# Patient Record
Sex: FEMALE | Race: WHITE | NOT HISPANIC OR LATINO | Employment: FULL TIME | URBAN - METROPOLITAN AREA
[De-identification: names, ages, dates, MRNs, and addresses within clinical notes are randomized per-mention and may not be internally consistent; named-entity substitution may affect disease eponyms.]

---

## 2018-01-18 ENCOUNTER — ANESTHESIA (OUTPATIENT)
Dept: SURGERY | Facility: CLINIC | Age: 28
End: 2018-01-18
Payer: COMMERCIAL

## 2018-01-18 ENCOUNTER — APPOINTMENT (OUTPATIENT)
Dept: CT IMAGING | Facility: CLINIC | Age: 28
End: 2018-01-18
Attending: EMERGENCY MEDICINE
Payer: COMMERCIAL

## 2018-01-18 ENCOUNTER — APPOINTMENT (OUTPATIENT)
Dept: GENERAL RADIOLOGY | Facility: CLINIC | Age: 28
End: 2018-01-18
Attending: EMERGENCY MEDICINE
Payer: COMMERCIAL

## 2018-01-18 ENCOUNTER — HOSPITAL ENCOUNTER (INPATIENT)
Facility: CLINIC | Age: 28
LOS: 6 days | Discharge: HOME | End: 2018-01-24
Attending: EMERGENCY MEDICINE | Admitting: SURGERY
Payer: COMMERCIAL

## 2018-01-18 ENCOUNTER — ANESTHESIA EVENT (OUTPATIENT)
Dept: SURGERY | Facility: CLINIC | Age: 28
End: 2018-01-18
Payer: COMMERCIAL

## 2018-01-18 DIAGNOSIS — R19.8 PERFORATED VISCUS: ICD-10-CM

## 2018-01-18 DIAGNOSIS — K35.32 RUPTURED APPENDICITIS: Primary | ICD-10-CM

## 2018-01-18 LAB
ALBUMIN SERPL-MCNC: 1.9 G/DL (ref 3.4–5)
ALBUMIN SERPL-MCNC: 2.9 G/DL (ref 3.4–5)
ALBUMIN UR-MCNC: 30 MG/DL
ALP SERPL-CCNC: 180 U/L (ref 40–150)
ALP SERPL-CCNC: 39 U/L (ref 40–150)
ALT SERPL W P-5'-P-CCNC: 15 U/L (ref 0–50)
ALT SERPL W P-5'-P-CCNC: 9 U/L (ref 0–50)
ANION GAP SERPL CALCULATED.3IONS-SCNC: 10 MMOL/L (ref 3–14)
ANION GAP SERPL CALCULATED.3IONS-SCNC: 12 MMOL/L (ref 3–14)
ANION GAP SERPL CALCULATED.3IONS-SCNC: 14 MMOL/L (ref 3–14)
APPEARANCE UR: CLEAR
AST SERPL W P-5'-P-CCNC: 13 U/L (ref 0–45)
AST SERPL W P-5'-P-CCNC: 16 U/L (ref 0–45)
BACTERIA #/AREA URNS HPF: ABNORMAL /HPF
BASE DEFICIT BLDV-SCNC: 11.8 MMOL/L
BASOPHILS # BLD AUTO: 0 10E9/L (ref 0–0.2)
BASOPHILS NFR BLD AUTO: 0 %
BILIRUB DIRECT SERPL-MCNC: 0.7 MG/DL (ref 0–0.2)
BILIRUB SERPL-MCNC: 1.1 MG/DL (ref 0.2–1.3)
BILIRUB SERPL-MCNC: 1.3 MG/DL (ref 0.2–1.3)
BILIRUB UR QL STRIP: NEGATIVE
BUN SERPL-MCNC: 15 MG/DL (ref 7–30)
BUN SERPL-MCNC: 18 MG/DL (ref 7–30)
BUN SERPL-MCNC: 22 MG/DL (ref 7–30)
CA-I BLD-MCNC: 4.5 MG/DL (ref 4.4–5.2)
CALCIUM SERPL-MCNC: 7 MG/DL (ref 8.5–10.1)
CALCIUM SERPL-MCNC: 7.2 MG/DL (ref 8.5–10.1)
CALCIUM SERPL-MCNC: 9.1 MG/DL (ref 8.5–10.1)
CHLORIDE SERPL-SCNC: 112 MMOL/L (ref 94–109)
CHLORIDE SERPL-SCNC: 113 MMOL/L (ref 94–109)
CHLORIDE SERPL-SCNC: 99 MMOL/L (ref 94–109)
CO2 SERPL-SCNC: 15 MMOL/L (ref 20–32)
CO2 SERPL-SCNC: 15 MMOL/L (ref 20–32)
CO2 SERPL-SCNC: 17 MMOL/L (ref 20–32)
COLOR UR AUTO: YELLOW
CREAT SERPL-MCNC: 0.82 MG/DL (ref 0.52–1.04)
CREAT SERPL-MCNC: 0.97 MG/DL (ref 0.52–1.04)
CREAT SERPL-MCNC: 1.54 MG/DL (ref 0.52–1.04)
DIFFERENTIAL METHOD BLD: ABNORMAL
EOSINOPHIL # BLD AUTO: 0 10E9/L (ref 0–0.7)
EOSINOPHIL NFR BLD AUTO: 0 %
ERYTHROCYTE [DISTWIDTH] IN BLOOD BY AUTOMATED COUNT: 12.2 % (ref 10–15)
ERYTHROCYTE [DISTWIDTH] IN BLOOD BY AUTOMATED COUNT: 12.6 % (ref 10–15)
GFR SERPL CREATININE-BSD FRML MDRD: 41 ML/MIN/1.7M2
GFR SERPL CREATININE-BSD FRML MDRD: 70 ML/MIN/1.7M2
GFR SERPL CREATININE-BSD FRML MDRD: 85 ML/MIN/1.7M2
GLUCOSE BLD-MCNC: 131 MG/DL (ref 70–99)
GLUCOSE BLDC GLUCOMTR-MCNC: 101 MG/DL (ref 70–99)
GLUCOSE BLDC GLUCOMTR-MCNC: 119 MG/DL (ref 70–99)
GLUCOSE BLDC GLUCOMTR-MCNC: 119 MG/DL (ref 70–99)
GLUCOSE BLDC GLUCOMTR-MCNC: 123 MG/DL (ref 70–99)
GLUCOSE SERPL-MCNC: 123 MG/DL (ref 70–99)
GLUCOSE SERPL-MCNC: 141 MG/DL (ref 70–99)
GLUCOSE SERPL-MCNC: 168 MG/DL (ref 70–99)
GLUCOSE UR STRIP-MCNC: NEGATIVE MG/DL
HBA1C MFR BLD: 4.6 % (ref 4.3–6)
HCG SERPL QL: NEGATIVE
HCO3 BLDV-SCNC: 16 MMOL/L (ref 21–28)
HCT VFR BLD AUTO: 37.9 % (ref 35–47)
HCT VFR BLD AUTO: 48.1 % (ref 35–47)
HGB BLD-MCNC: 13.2 G/DL (ref 11.7–15.7)
HGB BLD-MCNC: 14.2 G/DL (ref 11.7–15.7)
HGB BLD-MCNC: 17.2 G/DL (ref 11.7–15.7)
HGB UR QL STRIP: ABNORMAL
INTERPRETATION ECG - MUSE: NORMAL
KETONES UR STRIP-MCNC: 5 MG/DL
LACTATE BLD-SCNC: 2.3 MMOL/L (ref 0.7–2)
LACTATE BLD-SCNC: 2.4 MMOL/L (ref 0.7–2)
LACTATE BLD-SCNC: 2.9 MMOL/L (ref 0.7–2)
LACTATE BLD-SCNC: 4.4 MMOL/L (ref 0.7–2)
LEUKOCYTE ESTERASE UR QL STRIP: NEGATIVE
LIPASE SERPL-CCNC: 61 U/L (ref 73–393)
LYMPHOCYTES # BLD AUTO: 0.2 10E9/L (ref 0.8–5.3)
LYMPHOCYTES NFR BLD AUTO: 9.2 %
MAGNESIUM SERPL-MCNC: 1.4 MG/DL (ref 1.6–2.3)
MCH RBC QN AUTO: 33.2 PG (ref 26.5–33)
MCH RBC QN AUTO: 33.6 PG (ref 26.5–33)
MCHC RBC AUTO-ENTMCNC: 34.8 G/DL (ref 31.5–36.5)
MCHC RBC AUTO-ENTMCNC: 35.8 G/DL (ref 31.5–36.5)
MCV RBC AUTO: 94 FL (ref 78–100)
MCV RBC AUTO: 96 FL (ref 78–100)
METAMYELOCYTES # BLD: 0.2 10E9/L
METAMYELOCYTES NFR BLD MANUAL: 7.6 %
MONOCYTES # BLD AUTO: 0.1 10E9/L (ref 0–1.3)
MONOCYTES NFR BLD AUTO: 3.4 %
MUCOUS THREADS #/AREA URNS LPF: PRESENT /LPF
NEUTROPHILS # BLD AUTO: 2 10E9/L (ref 1.6–8.3)
NEUTROPHILS NFR BLD AUTO: 79.8 %
NITRATE UR QL: NEGATIVE
O2/TOTAL GAS SETTING VFR VENT: 50 %
PCO2 BLDV: 44 MM HG (ref 40–50)
PH BLDV: 7.18 PH (ref 7.32–7.43)
PH UR STRIP: 6 PH (ref 5–7)
PHOSPHATE SERPL-MCNC: 2.5 MG/DL (ref 2.5–4.5)
PHOSPHATE SERPL-MCNC: 2.6 MG/DL (ref 2.5–4.5)
PHOSPHATE SERPL-MCNC: 2.7 MG/DL (ref 2.5–4.5)
PLATELET # BLD AUTO: 142 10E9/L (ref 150–450)
PLATELET # BLD AUTO: 162 10E9/L (ref 150–450)
PO2 BLDV: 40 MM HG (ref 25–47)
POTASSIUM BLD-SCNC: 2.7 MMOL/L (ref 3.4–5.3)
POTASSIUM SERPL-SCNC: 2.4 MMOL/L (ref 3.4–5.3)
POTASSIUM SERPL-SCNC: 2.9 MMOL/L (ref 3.4–5.3)
POTASSIUM SERPL-SCNC: 3.5 MMOL/L (ref 3.4–5.3)
POTASSIUM SERPL-SCNC: 3.8 MMOL/L (ref 3.4–5.3)
PROT SERPL-MCNC: 4.8 G/DL (ref 6.8–8.8)
PROT SERPL-MCNC: 8 G/DL (ref 6.8–8.8)
RADIOLOGIST FLAGS: ABNORMAL
RBC # BLD AUTO: 3.97 10E12/L (ref 3.8–5.2)
RBC # BLD AUTO: 5.12 10E12/L (ref 3.8–5.2)
RBC #/AREA URNS AUTO: 2 /HPF (ref 0–2)
RBC MORPH BLD: NORMAL
SODIUM BLD-SCNC: 135 MMOL/L (ref 133–144)
SODIUM SERPL-SCNC: 128 MMOL/L (ref 133–144)
SODIUM SERPL-SCNC: 140 MMOL/L (ref 133–144)
SODIUM SERPL-SCNC: 140 MMOL/L (ref 133–144)
SOURCE: ABNORMAL
SP GR UR STRIP: 1.04 (ref 1–1.03)
SQUAMOUS #/AREA URNS AUTO: <1 /HPF (ref 0–1)
TRANS CELLS #/AREA URNS HPF: <1 /HPF (ref 0–1)
UROBILINOGEN UR STRIP-MCNC: NORMAL MG/DL (ref 0–2)
WBC # BLD AUTO: 2.5 10E9/L (ref 4–11)
WBC # BLD AUTO: 3.9 10E9/L (ref 4–11)
WBC #/AREA URNS AUTO: 2 /HPF (ref 0–2)

## 2018-01-18 PROCEDURE — 25000128 H RX IP 250 OP 636: Performed by: NURSE ANESTHETIST, CERTIFIED REGISTERED

## 2018-01-18 PROCEDURE — 99233 SBSQ HOSP IP/OBS HIGH 50: CPT | Mod: GC | Performed by: ANESTHESIOLOGY

## 2018-01-18 PROCEDURE — 85025 COMPLETE CBC W/AUTO DIFF WBC: CPT | Performed by: EMERGENCY MEDICINE

## 2018-01-18 PROCEDURE — 36415 COLL VENOUS BLD VENIPUNCTURE: CPT | Performed by: SURGERY

## 2018-01-18 PROCEDURE — 87040 BLOOD CULTURE FOR BACTERIA: CPT | Performed by: EMERGENCY MEDICINE

## 2018-01-18 PROCEDURE — 84295 ASSAY OF SERUM SODIUM: CPT | Performed by: SURGERY

## 2018-01-18 PROCEDURE — 84132 ASSAY OF SERUM POTASSIUM: CPT | Performed by: SURGERY

## 2018-01-18 PROCEDURE — 25000128 H RX IP 250 OP 636: Performed by: STUDENT IN AN ORGANIZED HEALTH CARE EDUCATION/TRAINING PROGRAM

## 2018-01-18 PROCEDURE — 96376 TX/PRO/DX INJ SAME DRUG ADON: CPT

## 2018-01-18 PROCEDURE — 96361 HYDRATE IV INFUSION ADD-ON: CPT

## 2018-01-18 PROCEDURE — 84100 ASSAY OF PHOSPHORUS: CPT | Performed by: STUDENT IN AN ORGANIZED HEALTH CARE EDUCATION/TRAINING PROGRAM

## 2018-01-18 PROCEDURE — 74177 CT ABD & PELVIS W/CONTRAST: CPT

## 2018-01-18 PROCEDURE — C9399 UNCLASSIFIED DRUGS OR BIOLOG: HCPCS | Performed by: NURSE ANESTHETIST, CERTIFIED REGISTERED

## 2018-01-18 PROCEDURE — 71000017 ZZH RECOVERY PHASE 1 LEVEL 3 EA ADDTL HR: Performed by: SURGERY

## 2018-01-18 PROCEDURE — 85027 COMPLETE CBC AUTOMATED: CPT | Performed by: STUDENT IN AN ORGANIZED HEALTH CARE EDUCATION/TRAINING PROGRAM

## 2018-01-18 PROCEDURE — 36000057 ZZH SURGERY LEVEL 3 1ST 30 MIN - UMMC: Performed by: SURGERY

## 2018-01-18 PROCEDURE — 82330 ASSAY OF CALCIUM: CPT | Performed by: SURGERY

## 2018-01-18 PROCEDURE — 25000132 ZZH RX MED GY IP 250 OP 250 PS 637: Performed by: STUDENT IN AN ORGANIZED HEALTH CARE EDUCATION/TRAINING PROGRAM

## 2018-01-18 PROCEDURE — 80048 BASIC METABOLIC PNL TOTAL CA: CPT | Performed by: STUDENT IN AN ORGANIZED HEALTH CARE EDUCATION/TRAINING PROGRAM

## 2018-01-18 PROCEDURE — 37000009 ZZH ANESTHESIA TECHNICAL FEE, EACH ADDTL 15 MIN: Performed by: SURGERY

## 2018-01-18 PROCEDURE — P9041 ALBUMIN (HUMAN),5%, 50ML: HCPCS | Performed by: NURSE ANESTHETIST, CERTIFIED REGISTERED

## 2018-01-18 PROCEDURE — 25000128 H RX IP 250 OP 636: Performed by: EMERGENCY MEDICINE

## 2018-01-18 PROCEDURE — 83605 ASSAY OF LACTIC ACID: CPT | Performed by: SURGERY

## 2018-01-18 PROCEDURE — 99285 EMERGENCY DEPT VISIT HI MDM: CPT | Mod: 25

## 2018-01-18 PROCEDURE — 82803 BLOOD GASES ANY COMBINATION: CPT | Performed by: SURGERY

## 2018-01-18 PROCEDURE — 93005 ELECTROCARDIOGRAM TRACING: CPT

## 2018-01-18 PROCEDURE — 80053 COMPREHEN METABOLIC PANEL: CPT | Performed by: EMERGENCY MEDICINE

## 2018-01-18 PROCEDURE — 96365 THER/PROPH/DIAG IV INF INIT: CPT

## 2018-01-18 PROCEDURE — 00000146 ZZHCL STATISTIC GLUCOSE BY METER IP

## 2018-01-18 PROCEDURE — 83690 ASSAY OF LIPASE: CPT | Performed by: EMERGENCY MEDICINE

## 2018-01-18 PROCEDURE — 82947 ASSAY GLUCOSE BLOOD QUANT: CPT | Performed by: SURGERY

## 2018-01-18 PROCEDURE — 12000008 ZZH R&B INTERMEDIATE UMMC

## 2018-01-18 PROCEDURE — 40000275 ZZH STATISTIC RCP TIME EA 10 MIN

## 2018-01-18 PROCEDURE — 83735 ASSAY OF MAGNESIUM: CPT | Performed by: STUDENT IN AN ORGANIZED HEALTH CARE EDUCATION/TRAINING PROGRAM

## 2018-01-18 PROCEDURE — 37000008 ZZH ANESTHESIA TECHNICAL FEE, 1ST 30 MIN: Performed by: SURGERY

## 2018-01-18 PROCEDURE — 87186 SC STD MICRODIL/AGAR DIL: CPT | Performed by: SURGERY

## 2018-01-18 PROCEDURE — 81001 URINALYSIS AUTO W/SCOPE: CPT | Performed by: EMERGENCY MEDICINE

## 2018-01-18 PROCEDURE — 84703 CHORIONIC GONADOTROPIN ASSAY: CPT | Performed by: EMERGENCY MEDICINE

## 2018-01-18 PROCEDURE — 25000566 ZZH SEVOFLURANE, EA 15 MIN: Performed by: SURGERY

## 2018-01-18 PROCEDURE — 87086 URINE CULTURE/COLONY COUNT: CPT | Performed by: EMERGENCY MEDICINE

## 2018-01-18 PROCEDURE — 80076 HEPATIC FUNCTION PANEL: CPT | Performed by: STUDENT IN AN ORGANIZED HEALTH CARE EDUCATION/TRAINING PROGRAM

## 2018-01-18 PROCEDURE — 40000196 ZZH STATISTIC RAPCV CVP MONITORING

## 2018-01-18 PROCEDURE — 84100 ASSAY OF PHOSPHORUS: CPT | Performed by: SURGERY

## 2018-01-18 PROCEDURE — 25000128 H RX IP 250 OP 636: Performed by: ANESTHESIOLOGY

## 2018-01-18 PROCEDURE — 36415 COLL VENOUS BLD VENIPUNCTURE: CPT | Performed by: STUDENT IN AN ORGANIZED HEALTH CARE EDUCATION/TRAINING PROGRAM

## 2018-01-18 PROCEDURE — 27210794 ZZH OR GENERAL SUPPLY STERILE: Performed by: SURGERY

## 2018-01-18 PROCEDURE — 87081 CULTURE SCREEN ONLY: CPT | Performed by: SURGERY

## 2018-01-18 PROCEDURE — 99284 EMERGENCY DEPT VISIT MOD MDM: CPT | Mod: Z6 | Performed by: EMERGENCY MEDICINE

## 2018-01-18 PROCEDURE — 88304 TISSUE EXAM BY PATHOLOGIST: CPT | Performed by: SURGERY

## 2018-01-18 PROCEDURE — 25000125 ZZHC RX 250: Performed by: EMERGENCY MEDICINE

## 2018-01-18 PROCEDURE — 71000016 ZZH RECOVERY PHASE 1 LEVEL 3 FIRST HR: Performed by: SURGERY

## 2018-01-18 PROCEDURE — 36000059 ZZH SURGERY LEVEL 3 EA 15 ADDTL MIN UMMC: Performed by: SURGERY

## 2018-01-18 PROCEDURE — 40000014 ZZH STATISTIC ARTERIAL MONITORING DAILY

## 2018-01-18 PROCEDURE — 74018 RADEX ABDOMEN 1 VIEW: CPT

## 2018-01-18 PROCEDURE — 83735 ASSAY OF MAGNESIUM: CPT | Performed by: SURGERY

## 2018-01-18 PROCEDURE — 0DTJ0ZZ RESECTION OF APPENDIX, OPEN APPROACH: ICD-10-PCS | Performed by: SURGERY

## 2018-01-18 PROCEDURE — 83605 ASSAY OF LACTIC ACID: CPT | Performed by: EMERGENCY MEDICINE

## 2018-01-18 PROCEDURE — 25000128 H RX IP 250 OP 636: Performed by: SURGERY

## 2018-01-18 PROCEDURE — 83036 HEMOGLOBIN GLYCOSYLATED A1C: CPT | Performed by: STUDENT IN AN ORGANIZED HEALTH CARE EDUCATION/TRAINING PROGRAM

## 2018-01-18 PROCEDURE — 83605 ASSAY OF LACTIC ACID: CPT | Performed by: STUDENT IN AN ORGANIZED HEALTH CARE EDUCATION/TRAINING PROGRAM

## 2018-01-18 PROCEDURE — 25000125 ZZHC RX 250: Performed by: NURSE ANESTHETIST, CERTIFIED REGISTERED

## 2018-01-18 PROCEDURE — 96366 THER/PROPH/DIAG IV INF ADDON: CPT

## 2018-01-18 PROCEDURE — 96375 TX/PRO/DX INJ NEW DRUG ADDON: CPT

## 2018-01-18 RX ORDER — IOPAMIDOL 755 MG/ML
92 INJECTION, SOLUTION INTRAVASCULAR ONCE
Status: COMPLETED | OUTPATIENT
Start: 2018-01-18 | End: 2018-01-18

## 2018-01-18 RX ORDER — HYDROMORPHONE HYDROCHLORIDE 1 MG/ML
0.5 INJECTION, SOLUTION INTRAMUSCULAR; INTRAVENOUS; SUBCUTANEOUS ONCE
Status: COMPLETED | OUTPATIENT
Start: 2018-01-18 | End: 2018-01-18

## 2018-01-18 RX ORDER — SODIUM CHLORIDE, SODIUM LACTATE, POTASSIUM CHLORIDE, CALCIUM CHLORIDE 600; 310; 30; 20 MG/100ML; MG/100ML; MG/100ML; MG/100ML
INJECTION, SOLUTION INTRAVENOUS CONTINUOUS
Status: DISCONTINUED | OUTPATIENT
Start: 2018-01-18 | End: 2018-01-20

## 2018-01-18 RX ORDER — POTASSIUM CHLORIDE 750 MG/1
20-40 TABLET, EXTENDED RELEASE ORAL
Status: DISCONTINUED | OUTPATIENT
Start: 2018-01-18 | End: 2018-01-23

## 2018-01-18 RX ORDER — SODIUM CHLORIDE 9 MG/ML
INJECTION, SOLUTION INTRAVENOUS CONTINUOUS PRN
Status: DISCONTINUED | OUTPATIENT
Start: 2018-01-18 | End: 2018-01-18

## 2018-01-18 RX ORDER — POTASSIUM CHLORIDE 7.45 MG/ML
10 INJECTION INTRAVENOUS
Status: DISCONTINUED | OUTPATIENT
Start: 2018-01-18 | End: 2018-01-23

## 2018-01-18 RX ORDER — SODIUM CHLORIDE, SODIUM LACTATE, POTASSIUM CHLORIDE, CALCIUM CHLORIDE 600; 310; 30; 20 MG/100ML; MG/100ML; MG/100ML; MG/100ML
INJECTION, SOLUTION INTRAVENOUS CONTINUOUS
Status: DISCONTINUED | OUTPATIENT
Start: 2018-01-18 | End: 2018-01-18 | Stop reason: HOSPADM

## 2018-01-18 RX ORDER — MAGNESIUM SULFATE HEPTAHYDRATE 40 MG/ML
4 INJECTION, SOLUTION INTRAVENOUS EVERY 4 HOURS PRN
Status: DISCONTINUED | OUTPATIENT
Start: 2018-01-18 | End: 2018-01-24 | Stop reason: HOSPADM

## 2018-01-18 RX ORDER — ONDANSETRON 2 MG/ML
4 INJECTION INTRAMUSCULAR; INTRAVENOUS EVERY 30 MIN PRN
Status: DISCONTINUED | OUTPATIENT
Start: 2018-01-18 | End: 2018-01-18 | Stop reason: HOSPADM

## 2018-01-18 RX ORDER — POTASSIUM CL/LIDO/0.9 % NACL 10MEQ/0.1L
10 INTRAVENOUS SOLUTION, PIGGYBACK (ML) INTRAVENOUS ONCE
Status: COMPLETED | OUTPATIENT
Start: 2018-01-18 | End: 2018-01-18

## 2018-01-18 RX ORDER — ONDANSETRON 4 MG/1
4 TABLET, ORALLY DISINTEGRATING ORAL EVERY 30 MIN PRN
Status: DISCONTINUED | OUTPATIENT
Start: 2018-01-18 | End: 2018-01-18 | Stop reason: HOSPADM

## 2018-01-18 RX ORDER — POTASSIUM CHLORIDE 1.5 G/1.58G
20-40 POWDER, FOR SOLUTION ORAL
Status: DISCONTINUED | OUTPATIENT
Start: 2018-01-18 | End: 2018-01-23

## 2018-01-18 RX ORDER — NALOXONE HYDROCHLORIDE 0.4 MG/ML
.1-.4 INJECTION, SOLUTION INTRAMUSCULAR; INTRAVENOUS; SUBCUTANEOUS
Status: ACTIVE | OUTPATIENT
Start: 2018-01-18 | End: 2018-01-19

## 2018-01-18 RX ORDER — PROPOFOL 10 MG/ML
INJECTION, EMULSION INTRAVENOUS PRN
Status: DISCONTINUED | OUTPATIENT
Start: 2018-01-18 | End: 2018-01-18

## 2018-01-18 RX ORDER — ONDANSETRON 2 MG/ML
INJECTION INTRAMUSCULAR; INTRAVENOUS PRN
Status: DISCONTINUED | OUTPATIENT
Start: 2018-01-18 | End: 2018-01-18

## 2018-01-18 RX ORDER — FENTANYL CITRATE 50 UG/ML
INJECTION, SOLUTION INTRAMUSCULAR; INTRAVENOUS PRN
Status: DISCONTINUED | OUTPATIENT
Start: 2018-01-18 | End: 2018-01-18

## 2018-01-18 RX ORDER — NALOXONE HYDROCHLORIDE 0.4 MG/ML
.1-.4 INJECTION, SOLUTION INTRAMUSCULAR; INTRAVENOUS; SUBCUTANEOUS
Status: DISCONTINUED | OUTPATIENT
Start: 2018-01-18 | End: 2018-01-18

## 2018-01-18 RX ORDER — SODIUM CHLORIDE, SODIUM LACTATE, POTASSIUM CHLORIDE, CALCIUM CHLORIDE 600; 310; 30; 20 MG/100ML; MG/100ML; MG/100ML; MG/100ML
INJECTION, SOLUTION INTRAVENOUS CONTINUOUS PRN
Status: DISCONTINUED | OUTPATIENT
Start: 2018-01-18 | End: 2018-01-18

## 2018-01-18 RX ORDER — NICOTINE POLACRILEX 4 MG
15-30 LOZENGE BUCCAL
Status: DISCONTINUED | OUTPATIENT
Start: 2018-01-18 | End: 2018-01-18

## 2018-01-18 RX ORDER — LIDOCAINE HYDROCHLORIDE 20 MG/ML
INJECTION, SOLUTION INFILTRATION; PERINEURAL PRN
Status: DISCONTINUED | OUTPATIENT
Start: 2018-01-18 | End: 2018-01-18

## 2018-01-18 RX ORDER — POTASSIUM CHLORIDE 7.45 MG/ML
INJECTION INTRAVENOUS PRN
Status: DISCONTINUED | OUTPATIENT
Start: 2018-01-18 | End: 2018-01-18

## 2018-01-18 RX ORDER — FENTANYL CITRATE 50 UG/ML
25-50 INJECTION, SOLUTION INTRAMUSCULAR; INTRAVENOUS
Status: DISCONTINUED | OUTPATIENT
Start: 2018-01-18 | End: 2018-01-18 | Stop reason: HOSPADM

## 2018-01-18 RX ORDER — HYDROMORPHONE HYDROCHLORIDE 1 MG/ML
.3-.5 INJECTION, SOLUTION INTRAMUSCULAR; INTRAVENOUS; SUBCUTANEOUS
Status: DISCONTINUED | OUTPATIENT
Start: 2018-01-18 | End: 2018-01-24

## 2018-01-18 RX ORDER — PIPERACILLIN SODIUM, TAZOBACTAM SODIUM 2; .25 G/10ML; G/10ML
INJECTION, POWDER, LYOPHILIZED, FOR SOLUTION INTRAVENOUS PRN
Status: DISCONTINUED | OUTPATIENT
Start: 2018-01-18 | End: 2018-01-18

## 2018-01-18 RX ORDER — POTASSIUM CL/LIDO/0.9 % NACL 10MEQ/0.1L
10 INTRAVENOUS SOLUTION, PIGGYBACK (ML) INTRAVENOUS
Status: DISCONTINUED | OUTPATIENT
Start: 2018-01-18 | End: 2018-01-23

## 2018-01-18 RX ORDER — POTASSIUM CHLORIDE 29.8 MG/ML
20 INJECTION INTRAVENOUS
Status: DISCONTINUED | OUTPATIENT
Start: 2018-01-18 | End: 2018-01-23

## 2018-01-18 RX ORDER — ALBUMIN, HUMAN INJ 5% 5 %
SOLUTION INTRAVENOUS CONTINUOUS PRN
Status: DISCONTINUED | OUTPATIENT
Start: 2018-01-18 | End: 2018-01-18

## 2018-01-18 RX ORDER — DEXTROSE MONOHYDRATE 25 G/50ML
25-50 INJECTION, SOLUTION INTRAVENOUS
Status: DISCONTINUED | OUTPATIENT
Start: 2018-01-18 | End: 2018-01-18

## 2018-01-18 RX ORDER — OXYCODONE HYDROCHLORIDE 5 MG/1
5-10 TABLET ORAL
Status: DISCONTINUED | OUTPATIENT
Start: 2018-01-18 | End: 2018-01-24

## 2018-01-18 RX ADMIN — OXYCODONE HYDROCHLORIDE 10 MG: 5 TABLET ORAL at 15:37

## 2018-01-18 RX ADMIN — ROCURONIUM BROMIDE 40 MG: 10 INJECTION INTRAVENOUS at 04:43

## 2018-01-18 RX ADMIN — POTASSIUM CHLORIDE 20 MEQ: 750 TABLET, EXTENDED RELEASE ORAL at 22:32

## 2018-01-18 RX ADMIN — Medication 10 MEQ: at 14:29

## 2018-01-18 RX ADMIN — FENTANYL CITRATE 50 MCG: 50 INJECTION INTRAMUSCULAR; INTRAVENOUS at 06:50

## 2018-01-18 RX ADMIN — Medication 100 MG: at 04:37

## 2018-01-18 RX ADMIN — Medication 10 MEQ: at 15:43

## 2018-01-18 RX ADMIN — MIDAZOLAM 1 MG: 1 INJECTION INTRAMUSCULAR; INTRAVENOUS at 05:02

## 2018-01-18 RX ADMIN — SODIUM CHLORIDE, POTASSIUM CHLORIDE, SODIUM LACTATE AND CALCIUM CHLORIDE: 600; 310; 30; 20 INJECTION, SOLUTION INTRAVENOUS at 20:52

## 2018-01-18 RX ADMIN — PIPERACILLIN SODIUM AND TAZOBACTAM SODIUM 3.38 G: 36; 4.5 INJECTION, POWDER, LYOPHILIZED, FOR SOLUTION INTRAVENOUS at 17:51

## 2018-01-18 RX ADMIN — FENTANYL CITRATE 50 MCG: 50 INJECTION, SOLUTION INTRAMUSCULAR; INTRAVENOUS at 05:46

## 2018-01-18 RX ADMIN — PROPOFOL 150 MG: 10 INJECTION, EMULSION INTRAVENOUS at 04:36

## 2018-01-18 RX ADMIN — ONDANSETRON 4 MG: 2 INJECTION INTRAMUSCULAR; INTRAVENOUS at 05:55

## 2018-01-18 RX ADMIN — HYDROMORPHONE HYDROCHLORIDE 1 MG: 1 INJECTION, SOLUTION INTRAMUSCULAR; INTRAVENOUS; SUBCUTANEOUS at 01:19

## 2018-01-18 RX ADMIN — SODIUM CHLORIDE: 9 INJECTION, SOLUTION INTRAVENOUS at 04:26

## 2018-01-18 RX ADMIN — MAGNESIUM SULFATE IN WATER 4 G: 40 INJECTION, SOLUTION INTRAVENOUS at 22:41

## 2018-01-18 RX ADMIN — PIPERACILLIN SODIUM AND TAZOBACTAM SODIUM 3.38 G: 36; 4.5 INJECTION, POWDER, LYOPHILIZED, FOR SOLUTION INTRAVENOUS at 02:22

## 2018-01-18 RX ADMIN — Medication 10 MEQ: at 16:58

## 2018-01-18 RX ADMIN — SODIUM CHLORIDE, POTASSIUM CHLORIDE, SODIUM LACTATE AND CALCIUM CHLORIDE 1000 ML: 600; 310; 30; 20 INJECTION, SOLUTION INTRAVENOUS at 22:35

## 2018-01-18 RX ADMIN — POTASSIUM CHLORIDE 10 MEQ: 7.46 INJECTION, SOLUTION INTRAVENOUS at 06:17

## 2018-01-18 RX ADMIN — HYDROMORPHONE HYDROCHLORIDE 0.5 MG: 1 INJECTION, SOLUTION INTRAMUSCULAR; INTRAVENOUS; SUBCUTANEOUS at 14:59

## 2018-01-18 RX ADMIN — SODIUM CHLORIDE 1000 ML: 9 INJECTION, SOLUTION INTRAVENOUS at 01:19

## 2018-01-18 RX ADMIN — SODIUM CHLORIDE, POTASSIUM CHLORIDE, SODIUM LACTATE AND CALCIUM CHLORIDE 1000 ML: 600; 310; 30; 20 INJECTION, SOLUTION INTRAVENOUS at 08:55

## 2018-01-18 RX ADMIN — ENOXAPARIN SODIUM 40 MG: 40 INJECTION SUBCUTANEOUS at 12:06

## 2018-01-18 RX ADMIN — HYDROMORPHONE HYDROCHLORIDE 0.5 MG: 1 INJECTION, SOLUTION INTRAMUSCULAR; INTRAVENOUS; SUBCUTANEOUS at 17:52

## 2018-01-18 RX ADMIN — PHENYLEPHRINE HYDROCHLORIDE 100 MCG: 10 INJECTION, SOLUTION INTRAMUSCULAR; INTRAVENOUS; SUBCUTANEOUS at 05:39

## 2018-01-18 RX ADMIN — PIPERACILLIN SODIUM AND TAZOBACTAM SODIUM 2.25 G: .25; 2 INJECTION, POWDER, LYOPHILIZED, FOR SOLUTION INTRAVENOUS at 06:08

## 2018-01-18 RX ADMIN — Medication 10 MEQ: at 10:39

## 2018-01-18 RX ADMIN — Medication 0.4 MG: at 06:56

## 2018-01-18 RX ADMIN — HYDROMORPHONE HYDROCHLORIDE 0.5 MG: 1 INJECTION, SOLUTION INTRAMUSCULAR; INTRAVENOUS; SUBCUTANEOUS at 19:53

## 2018-01-18 RX ADMIN — FENTANYL CITRATE 50 MCG: 50 INJECTION, SOLUTION INTRAMUSCULAR; INTRAVENOUS at 06:04

## 2018-01-18 RX ADMIN — FENTANYL CITRATE 25 MCG: 50 INJECTION INTRAMUSCULAR; INTRAVENOUS at 06:46

## 2018-01-18 RX ADMIN — PHENYLEPHRINE HYDROCHLORIDE 100 MCG: 10 INJECTION, SOLUTION INTRAMUSCULAR; INTRAVENOUS; SUBCUTANEOUS at 05:48

## 2018-01-18 RX ADMIN — SODIUM CHLORIDE 74 ML: 9 INJECTION, SOLUTION INTRAVENOUS at 02:39

## 2018-01-18 RX ADMIN — PIPERACILLIN SODIUM AND TAZOBACTAM SODIUM 3.38 G: 36; 4.5 INJECTION, POWDER, LYOPHILIZED, FOR SOLUTION INTRAVENOUS at 11:53

## 2018-01-18 RX ADMIN — PIPERACILLIN SODIUM AND TAZOBACTAM SODIUM 3.38 G: 36; 4.5 INJECTION, POWDER, LYOPHILIZED, FOR SOLUTION INTRAVENOUS at 23:58

## 2018-01-18 RX ADMIN — OXYCODONE HYDROCHLORIDE 10 MG: 5 TABLET ORAL at 22:46

## 2018-01-18 RX ADMIN — SODIUM CHLORIDE, POTASSIUM CHLORIDE, SODIUM LACTATE AND CALCIUM CHLORIDE: 600; 310; 30; 20 INJECTION, SOLUTION INTRAVENOUS at 04:26

## 2018-01-18 RX ADMIN — SODIUM CHLORIDE 1000 ML: 9 INJECTION, SOLUTION INTRAVENOUS at 02:22

## 2018-01-18 RX ADMIN — SUGAMMADEX 150 MG: 100 INJECTION, SOLUTION INTRAVENOUS at 06:07

## 2018-01-18 RX ADMIN — Medication 10 MEQ: at 12:46

## 2018-01-18 RX ADMIN — SODIUM CHLORIDE 1000 ML: 900 INJECTION, SOLUTION INTRAVENOUS at 03:35

## 2018-01-18 RX ADMIN — Medication 10 MEQ: at 02:54

## 2018-01-18 RX ADMIN — Medication 10 MEQ: at 11:45

## 2018-01-18 RX ADMIN — ALBUMIN HUMAN: 0.05 INJECTION, SOLUTION INTRAVENOUS at 04:40

## 2018-01-18 RX ADMIN — POTASSIUM CHLORIDE 10 MEQ: 7.46 INJECTION, SOLUTION INTRAVENOUS at 04:42

## 2018-01-18 RX ADMIN — Medication 0.5 MG: at 03:01

## 2018-01-18 RX ADMIN — Medication 0.4 MG: at 07:33

## 2018-01-18 RX ADMIN — SODIUM CHLORIDE, POTASSIUM CHLORIDE, SODIUM LACTATE AND CALCIUM CHLORIDE 1000 ML: 600; 310; 30; 20 INJECTION, SOLUTION INTRAVENOUS at 15:00

## 2018-01-18 RX ADMIN — FENTANYL CITRATE 25 MCG: 50 INJECTION INTRAMUSCULAR; INTRAVENOUS at 06:42

## 2018-01-18 RX ADMIN — OXYCODONE HYDROCHLORIDE 10 MG: 5 TABLET ORAL at 18:59

## 2018-01-18 RX ADMIN — IOPAMIDOL 92 ML: 755 INJECTION, SOLUTION INTRAVENOUS at 02:39

## 2018-01-18 RX ADMIN — LIDOCAINE HYDROCHLORIDE 100 MG: 20 INJECTION, SOLUTION INFILTRATION; PERINEURAL at 04:36

## 2018-01-18 RX ADMIN — HYDROMORPHONE HYDROCHLORIDE 0.5 MG: 1 INJECTION, SOLUTION INTRAMUSCULAR; INTRAVENOUS; SUBCUTANEOUS at 11:53

## 2018-01-18 ASSESSMENT — ENCOUNTER SYMPTOMS
HEMATURIA: 0
VOMITING: 0
DYSURIA: 0
NAUSEA: 0
ABDOMINAL PAIN: 1
DIARRHEA: 1

## 2018-01-18 ASSESSMENT — PAIN DESCRIPTION - DESCRIPTORS: DESCRIPTORS: ACHING;DISCOMFORT;DULL

## 2018-01-18 ASSESSMENT — VISUAL ACUITY
OU: NORMAL ACUITY

## 2018-01-18 NOTE — LETTER
[address] [facility]  [address] [facility] [address]  Phone: [phone]    January 24, 2018        Yuliana Licona  [address]   [place] [address]          To whom it may concern:    RE: Yuliana Licona    Patient was seen and treated at our facility 1/18/18 through 1/24/18. Please excuse her from work during that time and for the next 2 weeks, until she is seen in follow-up and given work restrictions.    Please contact me for questions or concerns.      Sincerely,        Isatu Smith, DO

## 2018-01-18 NOTE — ED NOTES
Pt arrived in triage with concerns of abdominal pain since Sunday, but seems to have been worse today. Pt says it is very sharp pain in the center of her abdomen.

## 2018-01-18 NOTE — OR NURSING
Potassium chloride completed, labs be drawn in PACU, okay for patient to go to ICU, LAURA Garcia to follow up on lab results.

## 2018-01-18 NOTE — ED PROVIDER NOTES
[facility] EMERGENCY DEPARTMENT ([facility])  1/18/18   ED 6 1:00 AM   History     Chief Complaint   Patient presents with     Abdominal Pain     The history is provided by the patient.     Yuliana Licona is a 27 year old female who presents with abdominal pain and diarrhea for the past 4 days with acute worsening of abdominal pain 2 hours ago. She is healthy at baseline and has no prior medical or surgical history. Patient states symptoms started 4 days ago with moderate abdominal pain and frequent diarrhea. She states she has had nonstop diarrhea for the past 4 days, consisting of yellow liquid with some flecks of stool. She states initially the abdominal pain was pretty bad for the first two days, but gradually improved. She was feeling much better earlier today though continued to have diarrhea. At 11:30 PM today she developed severe, acute sharp stabbing pain in her right lower quadrant tonight. She vomited once 4 days ago, none since. She denies nausea at this time. No sick contacts recently. No recent travel.  No dysuria or hematuria. No vaginal discharge. No fevers. She has never had symptoms like this before. No chest pain. She has been hydrating OK, did drink some pepsi today. She was able to eat well 3 days ago but ate less and less over the past couple of days. Over the past day the only thing she could eat was a single yogurt serving.  No history of heart conditions. She was brought here by her roommate. She was able to tolerate bumps during the drive without issue. She was able to walk in to triage but rode in wheelchair after due to abdominal pain.       PAST MEDICAL HISTORY: History reviewed. No pertinent past medical history.    PAST SURGICAL HISTORY:   Past Surgical History:   Procedure Laterality Date     LAPAROTOMY EXPLORATORY N/A 1/18/2018    Procedure: LAPAROTOMY EXPLORATORY;  Exploratory Laparotomy, Appendectomy;  Surgeon: Brianna Guthrie MD;  Location: [facility] OR       FAMILY  "HISTORY:   Family History   Problem Relation Age of Onset     CANCER No family hx of      no skin cancer       SOCIAL HISTORY:   Social History   Substance Use Topics     Smoking status: Former Smoker     Smokeless tobacco: Never Used     Alcohol use Yes      Comment: 5 per wk       Current Discharge Medication List      START taking these medications    Details   acetaminophen (TYLENOL) 325 MG tablet Take 3 tablets (975 mg) by mouth 3 times daily as needed for mild pain or fever  Qty: 100 tablet, Refills: 0    Associated Diagnoses: Ruptured appendicitis      polyethylene glycol (MIRALAX/GLYCOLAX) Packet Take 17 g by mouth daily  Qty: 21 packet, Refills: 0    Associated Diagnoses: Ruptured appendicitis      oxyCODONE IR (ROXICODONE) 5 MG tablet Take 1-2 tablets (5-10 mg) by mouth every 6 hours as needed for moderate to severe pain  Qty: 25 tablet, Refills: 0    Associated Diagnoses: Ruptured appendicitis         CONTINUE these medications which have NOT CHANGED    Details   fexofenadine (ALLEGRA ALLERGY) 180 MG tablet Take 1 tablet PO BID  Qty: 60 tablet, Refills: 3    Associated Diagnoses: Urticaria      cetirizine HCl 10 MG CAPS Take 1 tablet PO BID  Qty: 60 capsule, Refills: 3    Associated Diagnoses: Urticaria              No Known Allergies    I have reviewed the Medications, Allergies, Past Medical and Surgical History, and Social History in the Epic system.    Review of Systems   Gastrointestinal: Positive for abdominal pain and diarrhea. Negative for nausea and vomiting.   Genitourinary: Negative for dysuria, hematuria, vaginal bleeding and vaginal discharge.       Physical Exam   BP: 135/86  Pulse: 135  Temp: 97.7  F (36.5  C)  Resp: 18  Height: 172.7 cm (5' 8\")  Weight: 68 kg (150 lb)  SpO2: 98 %      Physical Exam   Constitutional: She is oriented to person, place, and time. No distress.   HENT:   Head: Normocephalic and atraumatic.   Mouth/Throat: Oropharynx is clear and moist. No oropharyngeal exudate. "   Eyes: Conjunctivae are normal. Pupils are equal, round, and reactive to light. Right eye exhibits no discharge. Left eye exhibits no discharge.   Neck: Normal range of motion. Neck supple.   Cardiovascular: Normal rate and intact distal pulses.    Pulmonary/Chest: Effort normal. No respiratory distress. She has no wheezes. She has no rales. She exhibits no tenderness.   Abdominal: There is tenderness. There is rebound and guarding.   Diffuse tenderness, with rebound and guarding   Musculoskeletal: Normal range of motion. She exhibits no edema or tenderness.   Neurological: She is alert and oriented to person, place, and time. She exhibits normal muscle tone.   Skin: Skin is warm and dry. She is not diaphoretic.   Nursing note and vitals reviewed.      ED Course     ED Course     Procedures             Critical Care time:  none             Labs Ordered and Resulted from Time of ED Arrival Up to the Time of Departure from the ED - No data to display         Assessments & Plan (with Medical Decision Making)   1.  Perforated viscus    28 yo F who presents with worsening abdominal pain.  Exam concerning for acute abdomen.  Xray with no evidence of free air.  Bedside US with no free fluid.  IV Zosyn given.  CT AP confirms free air with peritonitis. Discussed with surgery who brought patient to the OR.  She received 3L NS and tachycardia improved.  Pain improved with IV dilaudid.         I have reviewed the nursing notes.    I have reviewed the findings, diagnosis, plan and need for follow up with the patient.    New Prescriptions    No medications on file       Final diagnoses:   None       1/18/2018   [facility], [place], EMERGENCY DEPARTMENT     Dionisio Porter MD  01/24/18 0831

## 2018-01-18 NOTE — IP AVS SNAPSHOT
[address] [address]    Phone:  [phone]                                       After Visit Summary   1/18/2018    Yuliana Licona    MRN: [id number]           After Visit Summary Signature Page     I have received my discharge instructions, and my questions have been answered. I have discussed any challenges I see with this plan with the nurse or doctor.    ..........................................................................................................................................  Patient/Patient Representative Signature      ..........................................................................................................................................  Patient Representative Print Name and Relationship to Patient    ..................................................               ................................................  Date                                            Time    ..........................................................................................................................................  Reviewed by Signature/Title    ...................................................              ..............................................  Date                                                            Time

## 2018-01-18 NOTE — ANESTHESIA POSTPROCEDURE EVALUATION
Patient: Yuliana Licona    Procedure(s):  Exploratory Laparotomy, Appendectomy - Wound Class: IV-Dirty or Infected    Diagnosis:Perforated bowel  Diagnosis Additional Information: No value filed.    Anesthesia Type:  No value filed.    Note:  Anesthesia Post Evaluation    Patient location during evaluation: PACU  Patient participation: Able to fully participate in evaluation  Level of consciousness: awake and alert  Pain management: adequate  Airway patency: patent  Cardiovascular status: hemodynamically stable  Respiratory status: acceptable  Hydration status: stable  PONV: none     Anesthetic complications: None          Last vitals:  Vitals:    01/18/18 0340 01/18/18 0359 01/18/18 0400   BP: 96/66  93/69   Pulse:      Resp:  25    Temp:      SpO2: 99% 95%          Electronically Signed By: Mike Aceves MD  January 18, 2018  6:30 AM

## 2018-01-18 NOTE — IP AVS SNAPSHOT
MRN:[id number]                      After Visit Summary   1/18/2018    Yuliana Licona    MRN: [id number]           Thank you!     Thank you for choosing [facility] for your care. Our goal is always to provide you with excellent care. Hearing back from our patients is one way we can continue to improve our services. Please take a few minutes to complete the written survey that you may receive in the mail after you visit with us. Thank you!        Patient Information     Date Of Birth          1990        Designated Caregiver       Most Recent Value    Caregiver    Will someone help with your care after discharge? yes    Name of designated caregiver Emily Licona    Phone number of caregiver See Chart    Caregiver address See chart      About your hospital stay     You were admitted on:  January 18, 2018 You last received care in the:  [address] [facility]    You were discharged on:  January 24, 2018        Reason for your hospital stay       Ruptured appendicitis                  Who to Call     For medical emergencies, please call 911.  For non-urgent questions about your medical care, please call your primary care provider or clinic, None  For questions related to your surgery, please call your surgery clinic        Attending Provider     Provider Specialty    Dionisio Porter MD Emergency Medicine    [facility], Brianna Fong MD Surgery    [place], Otf Dior MD General Surgery       Primary Care Provider Fax #    Physician No Ref-Primary [phone]      After Care Instructions     Wound care and dressings       Instructions to care for your wound at home: Please change your dressing twice a day. You will use wet-to-dry packing. Please use sterile gauze and moisten with sterile salt water, and use this to pack the wound. Cover the packing with gauze. You may remove packing and shower - letting soapy water run over wound, do not scrub.                  Follow-up Appointments     Adult  [facility] Follow-up and recommended labs and tests       Follow up with Dr. Guthrie in clinic , at [facility], in 2 weeks  to evaluate after surgery. No follow up labs or test are needed.    Appointments on [place] and/or [facility] (with [facility] or [facility] provider or service). Call [phone] if you haven't heard regarding these appointments within 7 days of discharge.                  Your next 10 appointments already scheduled     Feb 08, 2018 10:00 AM CST   (Arrive by 9:45 AM)   New Patient Visit with Ryan Crain MD   [facility] ([facility] [place])    [address] [place] [address] [phone]            Feb 08, 2018 10:50 AM CST   (Arrive by 10:35 AM)   New Patient Visit with Neli Burrell MD   [facility] ([facility])    [address] [place] [address] [phone]              Additional Services     Physical Therapy Referral       [facility]   Phone: [phone]    Treatment: Evaluation & Treatment  PT Diagnosis: impaired functional mobility    Please be aware that coverage of these services is subject to the terms and limitations of your health insurance plan.  Call member services at your health plan with any benefit or coverage questions.                  Additional Information     If you use hormonal birth control (such as the pill, patch, ring or implants): You'll need a second form of birth control for 7 days (condoms, a diaphragm or contraceptive foam). While in the hospital, you received a medicine called Bridion. Your normal birth control will not work as well for a week after taking this medicine.          Pending Results     No orders found from 1/16/2018 to 1/19/2018.            Statement of Approval     Ordered          01/24/18 1252  I have reviewed and agree with all the recommendations and orders detailed in this  "document.  EFFECTIVE NOW     Approved and electronically signed by:  Sav Navarro MD             Admission Information     Date & Time Provider Department Dept. Phone    1/18/2018 Otf Murphy MD [address] [facility] [phone]      Your Vitals Were     Blood Pressure Pulse Temperature Respirations Height Weight    121/77 79 98.7  F (37.1  C) (Oral) 16 1.727 m (5' 8\") 70.1 kg (154 lb 8 oz)    Pulse Oximetry BMI (Body Mass Index)                99% 23.49 kg/m2          Care EveryWhere ID     This is your Care EveryWhere ID. This could be used by other organizations to access your [facility] medical records  [id number]        Equal Access to Services     ADDI MEDINA : Eric Morales, wadiann alberto, qacitlaly kaalmada raul, gianluca george. So [facility] [phone].    ATENCIÓN: Si habla español, tiene a campbell disposición servicios gratuitos de asistencia lingüística. Llame al [phone].    We comply with applicable federal civil rights laws and [place] laws. We do not discriminate on the basis of race, color, national origin, age, disability, sex, sexual orientation, or gender identity.               Review of your medicines      START taking        Dose / Directions    acetaminophen 325 MG tablet   Commonly known as:  TYLENOL        Dose:  975 mg   Take 3 tablets (975 mg) by mouth 3 times daily as needed for mild pain or fever   Quantity:  100 tablet   Refills:  0       oxyCODONE IR 5 MG tablet   Commonly known as:  ROXICODONE        Dose:  5-10 mg   Take 1-2 tablets (5-10 mg) by mouth every 6 hours as needed for moderate to severe pain   Quantity:  25 tablet   Refills:  0       polyethylene glycol Packet   Commonly known as:  MIRALAX/GLYCOLAX        Dose:  17 g   Take 17 g by mouth daily   Quantity:  21 packet   Refills:  0         CONTINUE these medicines which have NOT CHANGED        Dose / Directions    cetirizine HCl 10 MG Caps   Used for:  Urticaria     "    Take 1 tablet PO BID   Quantity:  60 capsule   Refills:  3       fexofenadine 180 MG tablet   Commonly known as:  ALLEGRA ALLERGY   Used for:  Urticaria        Take 1 tablet PO BID   Quantity:  60 tablet   Refills:  3            Where to get your medicines      These medications were sent to [facility] - [place], [place] - [address] [facility]  [address] [facility] [address]     Phone:  [phone]     acetaminophen 325 MG tablet    polyethylene glycol Packet         Some of these will need a paper prescription and others can be bought over the counter. Ask your nurse if you have questions.     Bring a paper prescription for each of these medications     oxyCODONE IR 5 MG tablet                Protect others around you: Learn how to safely use, store and throw away your medicines at www.disposemymeds.org.        Information about OPIOIDS     PRESCRIPTION OPIOIDS: WHAT YOU NEED TO KNOW    Prescription opioids can be used to help relieve moderate to severe pain and are often prescribed following a surgery or injury, or for certain health conditions. These medications can be an important part of treatment but also come with serious risks. It is important to work with your health care provider to make sure you are getting the safest, most effective care.    WHAT ARE THE RISKS AND SIDE EFFECTS OF OPIOID USE?  Prescription opioids carry serious risks of addiction and overdose, especially with prolonged use. An opioid overdose, often marked by slowed breathing can cause sudden death. The use of prescription opioids can have a number of side effects as well, even when taken as directed:      Tolerance - meaning you might need to take more of a medication for the same pain relief    Physical dependence - meaning you have symptoms of withdrawal when a medication is stopped    Increased sensitivity to pain    Constipation    Nausea, vomiting, and dry mouth    Sleepiness and  dizziness    Confusion    Depression    Low levels of testosterone that can result in lower sex drive, energy, and strength    Itching and sweating    RISKS ARE GREATER WITH:    History of drug misuse, substance use disorder, or overdose    Mental health conditions (such as depression or anxiety)    Sleep apnea    Older age (65 years or older)    Pregnancy    Avoid alcohol while taking prescription opioids.   Also, unless specifically advised by your health care provider, medications to avoid include:    Benzodiazepines (such as Xanax or Valium)    Muscle relaxants (such as Soma or Flexeril)    Hypnotics (such as Ambien or Lunesta)    Other prescription opioids    KNOW YOUR OPTIONS:  Talk to your health care provider about ways to manage your pain that do not involve prescription opioids. Some of these options may actually work better and have fewer risks and side effects:    Pain relievers such as acetaminophen, ibuprofen, and naproxen    Some medications that are also used for depression or seizures    Physical therapy and exercise    Cognitive behavioral therapy, a psychological, goal-directed approach, in which patients learn how to modify physical, behavioral, and emotional triggers of pain and stress    IF YOU ARE PRESCRIBED OPIOIDS FOR PAIN:    Never take opioids in greater amounts or more often than prescribed    Follow up with your primary health care provider and work together to create a plan on how to manage your pain.    Talk about ways to help manage your pain that do not involve prescription opioids    Talk about all concerns and side effects    Help prevent misuse and abuse    Never sell or share prescription opioids    Never use another person's prescription opioids    Store prescription opioids in a secure place and out of reach of others (this may include visitors, children, friends, and family)    Visit www.cdc.gov/drugoverdose to learn about risks of opioid abuse and overdose    If you believe  you may be struggling with addiction, tell your health care provider and ask for guidance or call [facility]'s National Helpline at 8-859-533-HELP    LEARN MORE / www.cdc.gov/drugoverdose/prescribing/guideline.html    Safely dispose of unused prescription opioids: Find your local drug take-back programs and more information about the importance of safe disposal at www.doseofreality.mn.gov             Medication List: This is a list of all your medications and when to take them. Check marks below indicate your daily home schedule. Keep this list as a reference.      Medications           Morning Afternoon Evening Bedtime As Needed    acetaminophen 325 MG tablet   Commonly known as:  TYLENOL   Take 3 tablets (975 mg) by mouth 3 times daily as needed for mild pain or fever   Last time this was given:  650 mg on 1/24/2018  1:14 PM                                cetirizine HCl 10 MG Caps   Take 1 tablet PO BID                                fexofenadine 180 MG tablet   Commonly known as:  ALLEGRA ALLERGY   Take 1 tablet PO BID                                oxyCODONE IR 5 MG tablet   Commonly known as:  ROXICODONE   Take 1-2 tablets (5-10 mg) by mouth every 6 hours as needed for moderate to severe pain   Last time this was given:  10 mg on 1/24/2018  1:14 PM                                polyethylene glycol Packet   Commonly known as:  MIRALAX/GLYCOLAX   Take 17 g by mouth daily   Last time this was given:  17 g on 1/24/2018  8:09 AM

## 2018-01-18 NOTE — ANESTHESIA PREPROCEDURE EVALUATION
Anesthesia Evaluation     .             ROS/MED HX    ENT/Pulmonary:  - neg pulmonary ROS     Neurologic:  - neg neurologic ROS     Cardiovascular:  - neg cardiovascular ROS       METS/Exercise Tolerance:     Hematologic:  - neg hematologic  ROS       Musculoskeletal:  - neg musculoskeletal ROS       GI/Hepatic:     (+) appendicitis, Other GI/Hepatic Perforated Apendix with Peirtonitis      Renal/Genitourinary:  - ROS Renal section negative       Endo:  - neg endo ROS       Psychiatric:  - neg psychiatric ROS       Infectious Disease:  - neg infectious disease ROS       Malignancy:      - no malignancy   Other:    - neg other ROS                 Physical Exam  Normal systems: cardiovascular, pulmonary and dental    Airway   Mallampati: I  TM distance: >3 FB  Neck ROM: full    Dental     Cardiovascular   Rhythm and rate: regular and normal      Pulmonary    breath sounds clear to auscultation                    Anesthesia Plan      History & Physical Review  History and physical reviewed and following examination; no interval change.    ASA Status:  3 .    NPO Status:  > 8 hours    Plan for General and ETT with Intravenous and Propofol induction. Maintenance will be Balanced.    PONV prophylaxis:  Ondansetron (or other 5HT-3) and Dexamethasone or Solumedrol       Postoperative Care  Postoperative pain management:  IV analgesics.      Consents  Anesthetic plan, risks, benefits and alternatives discussed with:  Patient..                          .

## 2018-01-18 NOTE — ANESTHESIA CARE TRANSFER NOTE
Patient: Yuliana Licona    Procedure(s):  Exploratory Laparotomy, Appendectomy - Wound Class: IV-Dirty or Infected    Diagnosis: Perforated bowel  Diagnosis Additional Information: No value filed.    Anesthesia Type:   No value filed.     Note:  Airway :Face Mask  Patient transferred to:PACU  Comments: Patient awake and making needs known, VÍCTOR RN at bedside to take report upon arrival to PARHandoff Report: Identifed the Patient, Identified the Reponsible Provider, Reviewed the pertinent medical history, Discussed the surgical course, Reviewed Intra-OP anesthesia mangement and issues during anesthesia, Set expectations for post-procedure period and Allowed opportunity for questions and acknowledgement of understanding      Vitals: (Last set prior to Anesthesia Care Transfer)    CRNA VITALS  1/18/2018 0602 - 1/18/2018 0633      1/18/2018             NIBP: 94/58    Pulse: 112    SpO2: 100 %    Resp Rate (observed): 16    EKG: Sinus tachycardia                Electronically Signed By: Kelsey Quiñones CRNA, APRN CRNA  January 18, 2018  6:33 AM

## 2018-01-18 NOTE — OP NOTE
DATE OF SERVICE:  01/18/2018      PREOPERATIVE DIAGNOSES:     1.  Cecal peritonitis.   1.  Ruptured appendicitis.      PROCEDURES PERFORMED:   1.  Exploratory laparotomy.   2.  Washout of contaminated abdomen.   3.  Appendectomy.   4.  Lysis of adhesions.      SURGEON:  Brianna Guthrie MD      ASSISTANTS:     1.  Mar Zhang MD   2.  Nrimal Hyman MD   3.  Medical student, Neli Edward, MS3.      INDICATIONS:  Yuliana is a 27-year-old woman with a 3-day history of diarrhea and abdominal discomfort who suddenly developed acute severe abdominal pain with peritonitis, fever, acute renal failure and hypokalemia. CT demonstrated fluid and air within the peritoneum, a fecalith that appeared to be external to a viscus and evidence of thickened edematous small bowel within the abdomen. She was consented for emergent laparotomy.      DESCRIPTION OF PROCEDURE:  Under general anesthesia in the supine position, the patient was prepped and draped and a timeout occurred.  The abdomen was entered through a lower midline incision. Upon entering the abdomen, a moderate amount of feculent purulent foul-smelling fluid was encountered.  The bowel was all edematous, beefy red and injected from inflammatory reaction.  There was a small amount of fibrinous exudate in the right lower quadrant and in the pelvis and examination revealed a retroperitoneal appendix with a 7 mm perforation at the midbody, which was foreshortened secondary to the inflammation with ongoing liquid stool spillage into the abdomen.  The gross spillage was suctioned out. Retractor was put in place.  The appendix was mobilized medially to the tip and then in a retrograde fashion to free it up from the small bowel mesentery, the appendiceal artery was encountered and divided and ligated with a suture for hemostasis.  The base of the appendix was then mobilized and found to be free of perforation and infection and this was divided using a green load stapler at the base  along the cecum.  The appendix was then passed off the field as specimen.  The staple line and the medial aspect of the appendiceal base dissection along the cecum was buttressed with the terminal ileum fat pad as it appeared that there may have been some violation of muscularis invasion of our dissection.  Interrupted Vicryl was used to bring the fat pad over and buttress over the serosal tear and the staple line.  The abdomen was then irrigated with copious amounts of warm saline until the effluent was clear.  The wound was then closed using looped 0 PDS suture.  The subcutaneous tissues were irrigated and closed loosely with staples and packed in between with gauze packing.  The patient was extubated and transported to the recovery room in stable condition without any obvious complications.         JAYDA JUARES MD             D: 2018 05:41   T: 2018 06:27   MT: DELANEY      Name:     JOE SNOW   MRN:      [id number]        Account:        [id number]   :      1990           Procedure Date: 2018      Document: [id number]

## 2018-01-18 NOTE — LETTER
Transition Communication Hand-off for Care Transitions to Next Level of Care Provider    Name: Yuliana Licona  MRN #: [id number]  Primary Care Provider: Physician No Ref-Primary     Primary Clinic: No address on file     Reason for Hospitalization:  Perforated viscus [R19.8]  Admit Date/Time: 1/18/2018 12:47 AM  Discharge Date: 1/24/2018  Payor Source: Payor: BCBS / Plan: [place]BS [place] / Product Type: Indemnity /          Reason for Communication Hand-off Referral: Other continuity of care    Discharge Plan: Discharged to home with family assist and OP PT, plan for f/u in clinic 2/8/2018     Discharge Needs Assessment:  Needs       Most Recent Value    Equipment Currently Used at Home none    Transportation Available family or friend will provide        Follow-up plan:  Future Appointments  Date Time Provider Department Center   2/8/2018 10:00 AM Ryan Crain MD [facility]   2/8/2018 10:50 AM Neli Burrell MD [facility]       Any outstanding tests or procedures:        Referrals     Future Labs/Procedures    Physical Therapy Referral     Comments:    Wale Rhodes [place]   Phone: [phone]    Treatment: Evaluation & Treatment  PT Diagnosis: impaired functional mobility    Please be aware that coverage of these services is subject to the terms and limitations of your health insurance plan.  Call member services at your health plan with any benefit or coverage questions.        Myriam Elmore, SERVANDO  [phone]    AVS/Discharge Summary is the source of truth; this is a helpful guide for improved communication of patient story

## 2018-01-18 NOTE — H&P
SURGICAL ICU ADMISSION NOTE  1/18/2018    PRIMARY TEAM: General surgery  PRIMARY PHYSICIAN: Dr. Guthrie    REASON FOR CRITICAL CARE ADMISSION: Post-op monitoring   ADMITTING PHYSICIAN: Dr. Ricketts    ASSESSMENT: Yuliana Licona is a 27 year old female who presents with abdominal pain and diarrhea for the past 4 days with acute worsening of abdominal pain, found to have ruptured appendicitis. She underwent ex-lap by general surgery and will be admitted to SICU for post-op monitoring.      PLAN:   Neuro/ pain/ sedation:  #Acute postoperative pain  - Monitor neurological status. Notify the MD for any acute changes in exam.  - Dilaudid 0.3-0.5 and oxycodone PRN for pain     Pulmonary care: No acute concerns   - Supplemental oxygen to keep saturation above 92 %.     Cardiovascular: No acute concerns   - Monitor hemodynamic status.      GI care:   #S/p ruptured appendicitis with feculent peritoneal fluid   - NPO except ice chips and medications.   - Zofran 4mg for nausea  - Serial abdominal exams      Fluids/ Electrolytes/ Nutrition:   # Hyponatremia and Hypokalemia likely 2/2 diarrhea/emesis and poor oral intake  - LR 100cc/hr for IV fluid hydration  - 1 L LR bolus postoperatively   - ICU electrolyte replacement protocol     Renal/ Fluid Balance:    #Metabolic acidosis 2/2 lactic acidosis, improving  - 3.5 L fluid bolus in ED with improving lactate   - 1 L fluid bolus postoperatively  - Maintenance IV fluids   - Will monitor intake and output.  - Nunes in place, will remove this afternoon  - Repeat lactate and BMP at 1200     Endocrine:  No acute concerns  - Blood glucose within normal limits  - Low dose SSI     ID/ Antibiotics:  #Sepsis 2/2 ruptured appendicitis, stable   - Zosyn sun-operatively x4 days  - Trending lactate  - Fluid bolus PRN     Heme:     - Follow-up hemoglobin  - Daily CBC     Prophylaxis:    - Lovenox for DVT.  - Ulcer prophylaxis not indicated     MSK:    - PT and OT consulted. Appreciate recs.      Lines/ tubes/ drains:  - PIVx2, phillips, NG tube     Disposition:  - Surgical ICU, floor today or tomorrow.    Patient seen, findings and plan discussed with surgical ICU staff, Dr. Ricketts.    -----------------------------------    Binu Alexander, DO  CA-1   Department of Anesthesiology  SICU  P: [phone]    - - - - - - - - - - - - - - - - - - - - - - - - - - - - - - - - - - - - - - - - - - - - - - - - - - - - - - - - - - - - - - - - - - - - - - - -     HISTORY PRESENTING ILLNESS:   Yuliana Licona is a 27 year old female who presents with abdominal pain and diarrhea for the past 4 days with acute worsening of abdominal pain 2 hours ago. She is healthy at baseline and has no prior medical or surgical history. Patient states symptoms started 4 days ago with moderate abdominal pain and frequent diarrhea. She states she has had nonstop diarrhea for the past 4 days, consisting of yellow liquid with some flecks of stool. She states initially the abdominal pain was pretty bad for the first two days, but gradually improved. She was feeling much better earlier today though continued to have diarrhea. At 11:30 PM today she developed severe, acute sharp stabbing pain in her right lower quadrant tonight. She vomited once 4 days ago, none since. She denies nausea at this time. No sick contacts recently. No recent travel.  No dysuria or hematuria. No vaginal discharge. No fevers. She has never had symptoms like this before. No chest pain. She has been hydrating OK, did drink some pepsi today. She was able to eat well 3 days ago but ate less and less over the past couple of days. Over the past day the only thing she could eat was a single yogurt serving.  No history of heart conditions. She was brought here by her roommate. She was able to tolerate bumps during the drive without issue. She was able to walk in to triage but rode in wheelchair after due to abdominal pain. She was taken to OR by general surgery for ex-lap.        REVIEW OF  SYSTEMS: 10 point ROS neg other than the symptoms noted above in the HPI.    PAST MEDICAL HISTORY:   History reviewed. No pertinent past medical history.    SURGICAL HISTORY:   History reviewed. No pertinent surgical history.    SOCIAL HISTORY:   Social History     Social History     Marital status: Single     Spouse name: N/A     Number of children: N/A     Years of education: N/A     Social History Main Topics     Smoking status: Former Smoker     Smokeless tobacco: Never Used     Alcohol use Yes      Comment: 5 per wk     Drug use: No     Sexual activity: Yes     Birth control/ protection: Condom     Other Topics Concern     None     Social History Narrative       FAMILY HISTORY: No bleeding/clotting disorders nor problems with anesthesia.    ALLERGIES:    No Known Allergies    MEDICATIONS:    No current facility-administered medications on file prior to encounter.   Current Outpatient Prescriptions on File Prior to Encounter:  fexofenadine (ALLEGRA ALLERGY) 180 MG tablet Take 1 tablet PO BID   cetirizine HCl 10 MG CAPS Take 1 tablet PO BID       PHYSICAL EXAMINATION:  Temp:  [97.7  F (36.5  C)-98.2  F (36.8  C)] 98.2  F (36.8  C)  Pulse:  [135] 135  Heart Rate:  [112-136] 112  Resp:  [18-34] 25  BP: ()/(58-92) 108/68  SpO2:  [93 %-100 %] 100 %    General: Alert and oriented, young female without acute distress  Neuro: A&Ox3, NAD, follows commands, moves all extremities spontaneously   Resp: Breathing non-labored, clear to auscultation bilaterally  CV: RRR  Abdomen: Soft, appropriately tender, midline incision with bandage in place that is c/d/i, mild distention without peritoneal signs  Extremities: warm and well perfused    LABS: Reviewed.   Arterial Blood Gases   No lab results found in last 7 days.  Complete Blood Count     Recent Labs  Lab 01/18/18  0553 01/18/18  0058   WBC  --  2.5*   HGB 14.2 17.2*   PLT  --  162     Basic Metabolic Panel    Recent Labs  Lab 01/18/18  0553 01/18/18  0058     128*   POTASSIUM 2.7* 2.4*   CHLORIDE  --  99   CO2  --  15*   BUN  --  22   CR  --  1.54*   * 168*     Liver Function Tests    Recent Labs  Lab 01/18/18  0058   AST 16   ALT 15   ALKPHOS 180*   BILITOTAL 1.3   ALBUMIN 2.9*     Pancreatic Enzymes    Recent Labs  Lab 01/18/18  0058   LIPASE 61*     Coagulation Profile  No lab results found in last 7 days.    IMAGING:  Recent Results (from the past 24 hour(s))   XR Abdomen 1 View    Narrative    Examination:  XR ABDOMEN 1 VW 1/18/2018 1:34 AM     Comparison: None.    History: abd pain eval for free air, include;     Findings: Single upright frontal radiograph of abdomen was obtained.  Lower part of the abdomen and pelvis are covered by shield.   Nonobstructive bowel gas pattern. There are no abnormal  calcifications. There are no abnormal soft tissue densities. There is  no free air. No evidence of pneumatosis. No portal venous gas. The  lung bases are unremarkable.      Impression    Impression:   Limited evaluation of the mid and upper abdomen and lower chest:  1. Nonobstructive bowel gas pattern.  2. No evidence for free air in the abdomen.    I have personally reviewed the examination and initial interpretation  and I agree with the findings.    ADRIANA ANTONIO MD   CT Abdomen Pelvis w Contrast   Result Value    Radiologist flags Pneumoperitoneum,possible perforated appendicitis (Urgent)    Narrative    Preliminary report:  This is a preliminary resident interpretation. Full report to follow.        Impression    IMPRESSION:   1. Moderate amount of free fluid in the abdomen and pelvis in addition  to extraluminal air is compatible with pneumoperitoneum due to  perforation intra-abdominal organs. There is a rim-enhancing fluid  collection in the right pelvic cul-de-sac suggestive of abscess  formation.   2. Although evaluation of the appendix is limited due to extensive  inflammatory changes of mesentery, there is a tubular structure  measuring 11 mm with  enhancing wall containing 2 hyperdense foci. This  likely representing an acute appendicitis with appendicolith and  possible perforation.      [Urgent Result: Pneumoperitoneum,possible perforated appendicitis]    Finding was identified on 1/18/2018 2:56 AM.     Dr. Palencia was contacted by Dr. Gonzalez at 1/18/2018 3:00 AM and  verbalized understanding of the urgent finding.

## 2018-01-18 NOTE — H&P
General Surgery Admission History and Physical    Yuliana Licona MRN# [id number]     Date of Admission: 1/18/2018    CC: abdominal pain    Assessment: 27 year old female with acute worsening of abdominal pain following 3 day sof mild abdominal pain/diarrhea, CT with extraluminal free air likely from perforated appendicitis. WBC 2.5, LA 4.4, Cr 1.54. Clinically with diffuse peritonitis.     Plan:   - IVF resuscitation, Zosyn given in ED  - OR for exploratory laparotomy    Discussed with staff, Dr Guthrie    HPI: 26 yo otherwise healthy female presents with acute stabbing abdominal pain in RLQ since 1130PM today. States that she had been having mild abdominal pain and watery/mucous diarrhea since 3 days ago. Pain has actually been getting better over past 3 days until around 1130PM tonight when she acutely had severe stabbing abdominal pain in RLQ now extending to entire abdomen. Emesis x1 on Sunday but since then no further n/v. Does have some subjective fever/chills. Denies episodes like this in past. Denies personal or family history of IBD or malignancies.     Past Medical History:  History reviewed. No pertinent past medical history.    Past Surgical History:  History reviewed. No pertinent surgical history.    Allergies:   No Known Allergies    Medications:    No current facility-administered medications on file prior to encounter.   Current Outpatient Prescriptions on File Prior to Encounter:  fexofenadine (ALLEGRA ALLERGY) 180 MG tablet Take 1 tablet PO BID   cetirizine HCl 10 MG CAPS Take 1 tablet PO BID       Social History:  Social History     Social History     Marital status: Single     Spouse name: N/A     Number of children: N/A     Years of education: N/A     Occupational History     Not on file.     Social History Main Topics     Smoking status: Former Smoker     Smokeless tobacco: Never Used     Alcohol use Yes      Comment: 5 per wk     Drug use: No     Sexual activity: Yes     Birth control/  "protection: Condom     Other Topics Concern     Not on file     Social History Narrative       Family History:  Family History   Problem Relation Age of Onset     CANCER No family hx of      no skin cancer       ROS:  Otherwise negative    Exam:  /86  Pulse 135  Temp 97.7  F (36.5  C) (Oral)  Resp 18  Ht 1.727 m (5' 8\")  Wt 68 kg (150 lb)  SpO2 98%  BMI 22.81 kg/m2  General: Alert, interactive, moderate distress from pain  Chest: mildly labored breathing from pain, tachycardic in 130s  Abdomen: Soft, diffusely tender to even shallow palpation with voluntary guarding, minor distension. No rebound, referred pain.  Extremities: No LE edema or obvious joint abnormalities  Skin: Warm and dry, no jaundice or rash    Labs: WBC 2.5, lactate 4.4, Cr 1.54  Imaging: CT with free air above liver and around right paracolic gutter. Likely extraluminal appendicolith.     Robert Palencia MD   Surgery PGY-2    "

## 2018-01-18 NOTE — BRIEF OP NOTE
[facility], [place]    Brief Operative Note    Pre-operative diagnosis: Perforated bowel  Post-operative diagnosis Fecal peritonitis, ruptured appendix  Procedure: Procedure(s):  LAPAROTOMY EXPLORATORY  - Wound Class: I-Clean  Surgeon: Surgeon(s) and Role:     * Brianna Guthrie MD - Primary     * Mar Zhang MD - Resident - Assisting     * Robert Palencia MD - Resident - Assisting  Anesthesia: General   Estimated blood loss: Less than 10 ml  Drains: None  Specimens:   ID Type Source Tests Collected by Time Destination   A : Appendix Tissue Appendix SURGICAL PATHOLOGY EXAM Brianna Guthrie MD 1/18/2018  5:31 AM      Findings:   The appendix had a 7mm perforation away from the base and feco-purulent peritonitis, severely inflamed small bowel, pelvic abscess  Complications: None.  Implants: None.

## 2018-01-18 NOTE — OR NURSING
Dr. Aceves at bedside in PACU. Potassium chloride replacement in line, will recheck potassium post completion of medication.

## 2018-01-19 ENCOUNTER — APPOINTMENT (OUTPATIENT)
Dept: PHYSICAL THERAPY | Facility: CLINIC | Age: 28
End: 2018-01-19
Attending: SURGERY
Payer: COMMERCIAL

## 2018-01-19 LAB
ANION GAP SERPL CALCULATED.3IONS-SCNC: 8 MMOL/L (ref 3–14)
BACTERIA SPEC CULT: NO GROWTH
BUN SERPL-MCNC: 11 MG/DL (ref 7–30)
CALCIUM SERPL-MCNC: 7.6 MG/DL (ref 8.5–10.1)
CHLORIDE SERPL-SCNC: 110 MMOL/L (ref 94–109)
CO2 SERPL-SCNC: 21 MMOL/L (ref 20–32)
CREAT SERPL-MCNC: 0.62 MG/DL (ref 0.52–1.04)
ERYTHROCYTE [DISTWIDTH] IN BLOOD BY AUTOMATED COUNT: 13.3 % (ref 10–15)
GFR SERPL CREATININE-BSD FRML MDRD: >90 ML/MIN/1.7M2
GLUCOSE BLDC GLUCOMTR-MCNC: 102 MG/DL (ref 70–99)
GLUCOSE BLDC GLUCOMTR-MCNC: 120 MG/DL (ref 70–99)
GLUCOSE SERPL-MCNC: 104 MG/DL (ref 70–99)
HCT VFR BLD AUTO: 34.7 % (ref 35–47)
HGB BLD-MCNC: 12 G/DL (ref 11.7–15.7)
LACTATE BLD-SCNC: 1.7 MMOL/L (ref 0.7–2)
Lab: NORMAL
MAGNESIUM SERPL-MCNC: 3 MG/DL (ref 1.6–2.3)
MAGNESIUM SERPL-MCNC: 3 MG/DL (ref 1.6–2.3)
MCH RBC QN AUTO: 33.3 PG (ref 26.5–33)
MCHC RBC AUTO-ENTMCNC: 34.6 G/DL (ref 31.5–36.5)
MCV RBC AUTO: 96 FL (ref 78–100)
PHOSPHATE SERPL-MCNC: 1.5 MG/DL (ref 2.5–4.5)
PHOSPHATE SERPL-MCNC: 1.8 MG/DL (ref 2.5–4.5)
PLATELET # BLD AUTO: 177 10E9/L (ref 150–450)
POTASSIUM SERPL-SCNC: 3.4 MMOL/L (ref 3.4–5.3)
RBC # BLD AUTO: 3.6 10E12/L (ref 3.8–5.2)
SODIUM SERPL-SCNC: 140 MMOL/L (ref 133–144)
SPECIMEN SOURCE: NORMAL
WBC # BLD AUTO: 10 10E9/L (ref 4–11)

## 2018-01-19 PROCEDURE — 36415 COLL VENOUS BLD VENIPUNCTURE: CPT | Performed by: SURGERY

## 2018-01-19 PROCEDURE — 83605 ASSAY OF LACTIC ACID: CPT | Performed by: SURGERY

## 2018-01-19 PROCEDURE — 83735 ASSAY OF MAGNESIUM: CPT | Performed by: SURGERY

## 2018-01-19 PROCEDURE — 84100 ASSAY OF PHOSPHORUS: CPT | Performed by: SURGERY

## 2018-01-19 PROCEDURE — 25000132 ZZH RX MED GY IP 250 OP 250 PS 637: Performed by: STUDENT IN AN ORGANIZED HEALTH CARE EDUCATION/TRAINING PROGRAM

## 2018-01-19 PROCEDURE — 40000193 ZZH STATISTIC PT WARD VISIT

## 2018-01-19 PROCEDURE — 25000128 H RX IP 250 OP 636: Performed by: STUDENT IN AN ORGANIZED HEALTH CARE EDUCATION/TRAINING PROGRAM

## 2018-01-19 PROCEDURE — 97116 GAIT TRAINING THERAPY: CPT | Mod: GP

## 2018-01-19 PROCEDURE — 85027 COMPLETE CBC AUTOMATED: CPT | Performed by: SURGERY

## 2018-01-19 PROCEDURE — 80048 BASIC METABOLIC PNL TOTAL CA: CPT | Performed by: SURGERY

## 2018-01-19 PROCEDURE — 00000146 ZZHCL STATISTIC GLUCOSE BY METER IP

## 2018-01-19 PROCEDURE — 97530 THERAPEUTIC ACTIVITIES: CPT | Mod: GP

## 2018-01-19 PROCEDURE — 12000008 ZZH R&B INTERMEDIATE UMMC

## 2018-01-19 PROCEDURE — 97161 PT EVAL LOW COMPLEX 20 MIN: CPT | Mod: GP

## 2018-01-19 PROCEDURE — 25000125 ZZHC RX 250: Performed by: STUDENT IN AN ORGANIZED HEALTH CARE EDUCATION/TRAINING PROGRAM

## 2018-01-19 RX ORDER — ACETAMINOPHEN 325 MG/1
650 TABLET ORAL EVERY 4 HOURS PRN
Status: DISCONTINUED | OUTPATIENT
Start: 2018-01-19 | End: 2018-01-24 | Stop reason: HOSPADM

## 2018-01-19 RX ADMIN — PIPERACILLIN SODIUM AND TAZOBACTAM SODIUM 3.38 G: 36; 4.5 INJECTION, POWDER, LYOPHILIZED, FOR SOLUTION INTRAVENOUS at 06:08

## 2018-01-19 RX ADMIN — ACETAMINOPHEN 650 MG: 325 TABLET, FILM COATED ORAL at 01:33

## 2018-01-19 RX ADMIN — HYDROMORPHONE HYDROCHLORIDE 0.5 MG: 1 INJECTION, SOLUTION INTRAMUSCULAR; INTRAVENOUS; SUBCUTANEOUS at 04:18

## 2018-01-19 RX ADMIN — POTASSIUM CHLORIDE 20 MEQ: 750 TABLET, EXTENDED RELEASE ORAL at 11:32

## 2018-01-19 RX ADMIN — ACETAMINOPHEN 650 MG: 325 TABLET, FILM COATED ORAL at 13:38

## 2018-01-19 RX ADMIN — POTASSIUM PHOSPHATE, MONOBASIC AND POTASSIUM PHOSPHATE, DIBASIC 10 MMOL: 224; 236 INJECTION, SOLUTION INTRAVENOUS at 01:33

## 2018-01-19 RX ADMIN — ACETAMINOPHEN 650 MG: 325 TABLET, FILM COATED ORAL at 05:26

## 2018-01-19 RX ADMIN — POTASSIUM PHOSPHATE, MONOBASIC AND POTASSIUM PHOSPHATE, DIBASIC 20 MMOL: 224; 236 INJECTION, SOLUTION INTRAVENOUS at 13:30

## 2018-01-19 RX ADMIN — PIPERACILLIN SODIUM AND TAZOBACTAM SODIUM 3.38 G: 36; 4.5 INJECTION, POWDER, LYOPHILIZED, FOR SOLUTION INTRAVENOUS at 13:29

## 2018-01-19 RX ADMIN — OXYCODONE HYDROCHLORIDE 10 MG: 5 TABLET ORAL at 09:34

## 2018-01-19 RX ADMIN — PIPERACILLIN SODIUM AND TAZOBACTAM SODIUM 3.38 G: 36; 4.5 INJECTION, POWDER, LYOPHILIZED, FOR SOLUTION INTRAVENOUS at 18:50

## 2018-01-19 RX ADMIN — ENOXAPARIN SODIUM 40 MG: 40 INJECTION SUBCUTANEOUS at 11:32

## 2018-01-19 RX ADMIN — ACETAMINOPHEN 650 MG: 325 TABLET, FILM COATED ORAL at 09:34

## 2018-01-19 RX ADMIN — ACETAMINOPHEN 650 MG: 325 TABLET, FILM COATED ORAL at 18:49

## 2018-01-19 RX ADMIN — OXYCODONE HYDROCHLORIDE 10 MG: 5 TABLET ORAL at 21:57

## 2018-01-19 RX ADMIN — SODIUM CHLORIDE, POTASSIUM CHLORIDE, SODIUM LACTATE AND CALCIUM CHLORIDE: 600; 310; 30; 20 INJECTION, SOLUTION INTRAVENOUS at 06:31

## 2018-01-19 RX ADMIN — OXYCODONE HYDROCHLORIDE 10 MG: 5 TABLET ORAL at 18:49

## 2018-01-19 RX ADMIN — HYDROMORPHONE HYDROCHLORIDE 0.5 MG: 1 INJECTION, SOLUTION INTRAMUSCULAR; INTRAVENOUS; SUBCUTANEOUS at 21:00

## 2018-01-19 RX ADMIN — OXYCODONE HYDROCHLORIDE 10 MG: 5 TABLET ORAL at 13:38

## 2018-01-19 NOTE — PLAN OF CARE
Problem: Pain, Acute (Adult)  Goal: Acceptable Pain Control/Comfort Level  Patient will demonstrate the desired outcomes by discharge/transition of care.   Outcome: Improving  Pt tachycardic and tachypnea, 's at rest, RR in the 20's. With OOB activity, RR increased to 36-38 and HR increased to 130's. Lactic acid triggered, 2.9 resulted. Provider paged and order for 1L bolus and frequent use of IS. Pt is using IS frequently and has been upon arrival to the unit. Abd midline w/packing and dressing in place. Pt reports abd pain, prn oxycodone and dilaudid administered w/adquate relief. Urethral phillips in place w/adequate urine ouput, yellow in color, BS faint, no flatus. K recheck 3.5, PO replacement administered and recheck tomorrow. Phos 2.7, replacement ordered from pharmacy. Mg 1.4, pt receiving 4g replacement at this time. AOx4, able to make need knows. Page sent at this time for LA recheck. Cont POC.

## 2018-01-19 NOTE — PROGRESS NOTES
Surgery Progress Note  1/19/2018     Subjective:  - JENNY overnight.  - Pain well-controlled. Denies N/V. Is passing flatus, no BM.    Objective:  Temp:  [97.3  F (36.3  C)-100.9  F (38.3  C)] 99.8  F (37.7  C)  Heart Rate:  [104-138] 121  Resp:  [23-38] 27  BP: ()/(52-72) 97/60  SpO2:  [90 %-100 %] 94 %  I/O last 3 completed shifts:  In: 4981.67 [P.O.:90; I.V.:2891.67; IV Piggyback:2000]  Out: 1505 [Urine:1505]    Gen: Awake, alert, NAD   Resp: NLB on 2L NC  Abd: Soft, ND, tender to palpation around incision sites  Ext: WWP  Dressing/Incision: re-dressed/packed wound, no surrounding erythema, no pustular discharge, clean base.    BMP  Recent Labs  Lab 01/19/18 0721 01/18/18 2022 01/18/18  1256 01/18/18  0748 01/18/18  0553 01/18/18  0058     --  140 140 135 128*   POTASSIUM 3.4 3.5 3.8 2.9* 2.7* 2.4*   CHLORIDE 110*  --  112* 113*  --  99   MOE 7.6*  --  7.2* 7.0*  --  9.1   CO2 21  --  17* 15*  --  15*   BUN 11  --  15 18  --  22   CR 0.62  --  0.82 0.97  --  1.54*   *  --  123* 141* 131* 168*     CBC  Recent Labs  Lab 01/19/18 0721 01/18/18  0748 01/18/18  0553 01/18/18  0058   WBC 10.0 3.9*  --  2.5*   RBC 3.60* 3.97  --  5.12   HGB 12.0 13.2 14.2 17.2*   HCT 34.7* 37.9  --  48.1*   MCV 96 96  --  94   MCH 33.3* 33.2*  --  33.6*   MCHC 34.6 34.8  --  35.8   RDW 13.3 12.6  --  12.2    142*  --  162     INRNo lab results found in last 7 days.   AST/ALT & Alk Phos  Recent Labs  Lab 01/18/18  0748 01/18/18 0058   AST 13 16   ALT 9 15   ALKPHOS 39* 180*     Bili  Recent Labs   Lab Test  01/18/18   0748  01/18/18 0058   BILITOTAL  1.1  1.3   DBIL  0.7*   --      Lipase/Amlyase  Recent Labs  Lab 01/18/18 0058   LIPASE 61*       A/P: Yuliana Licona is a 27 year old previously healthy female admitted with perforated appendicitis. S/P exploratory laparotomy with washout of contaminated abdomen, appendectomy, lysis of adhesions.  - NPO except ice chips, may have half a popsicle this AM  - We  do not want to advance her diet until she has evidence of antegrade function  - Remove phillips today  - BID dressing changes  - Continue with antibiotics  - IVF with LR 100mL/h      D/w chief resident and staff.    Isatu Smith, DO  PGY1

## 2018-01-19 NOTE — PROGRESS NOTES
[facility], [place]    Sepsis Evaluation Progress Note    Date of Service: 01/18/2018    I was called to see Yuliaan Licona due to abnormal vital signs triggering the Sepsis SIRS screening alert. She is known to have an infection.     Physical Exam    Vital Signs:  Temp: 98.6  F (37  C) Temp src: Oral BP: 104/66 Pulse: 135 Heart Rate: 126 Resp: 25 SpO2: 94 % O2 Device: None (Room air) Oxygen Delivery: 2 LPM    Lab:  Lactic Acid   Date Value Ref Range Status   01/18/2018 2.9 (H) 0.7 - 2.0 mmol/L Final       The patient is at baseline mental status.    The rest of their physical exam is significant for:.  Alert and oriented  Non labored breathing   Non cyanotic  Soft abdomen, distended, appropriately tender, no signs of peritonitis     Assessment and Plan    The SIRS and exam findings are likely due to   sepsis.     ID: The patient is currently on the following antibiotics:  Anti-infectives (Future)    Start     Dose/Rate Route Frequency Ordered Stop    01/18/18 1200  piperacillin-tazobactam (ZOSYN) 3.375g in 15 mL NS Premix Syringe      3.375 g  over 3 Minutes Intravenous EVERY 6 HOURS 01/18/18 0817 01/22/18 1159        Current antibiotic coverage is appropriate for source of infection.    Fluid: Fluid bolus ordered.    Lab: Repeat lactic acid is not indicated.      Disposition: The patient will remain on the current unit. We will continue to monitor this patient closely.  Cyn Saavedra MD

## 2018-01-19 NOTE — PLAN OF CARE
Problem: Patient Care Overview  Goal: Plan of Care/Patient Progress Review  Outcome: No Change      VSS on 2L O2 via NC. O2 sats 90% on RA. Tmax 100.9, temp rechec 99.9, tylenol PRN given both times. PIV in left infusing LR @ 100 mL/hr. PIV in right infusing phos over 4 hours. Zosyn given IVP per orders. Midline incision packed, dressing c/d/i. Nunes intact with total 375 mL UOP. Pt c/o pain, dilaudid given x1 with some relief. Hypoactive BS, pt reports not passing flatus yet. Denies nausea. . Phos replaced overnight, recheck in AM. Sleeping between cares.

## 2018-01-19 NOTE — PLAN OF CARE
"Problem: Patient Care Overview  Goal: Plan of Care/Patient Progress Review  D: per MD note, \"...27 year old female who presents with abdominal pain and diarrhea for the past 4 days with acute worsening of abdominal pain, found to have ruptured appendicitis. She underwent ex-lap by general surgery...\"   PROCEDURES PERFORMED:   1.  Exploratory laparotomy.   2.  Washout of contaminated abdomen.   3.  Appendectomy.   4.  Lysis of adhesions.   Neuro: AAOx4. Pain controlled with PRN 0.5mg dialudid q2hrs. PERRL 2-3mm brisk.  CV: afebrile -110s with no ectopy noted. 90s/50-60s with MAPs in the 60s.   Pulm: clear lungs satting 05%+ on RA. ETCO2 30s with IPI of 7-10.   GI/: adequate UO to phillips. 1Liter bolus this AM after transfer from PACU. Hypoactive BS, no BM.  Skin: midline incision with staples and packing present under CDI dressing, per surgery note.  Plan: trend labs and continue to monitor. Notify surgery with changes.         "

## 2018-01-19 NOTE — PLAN OF CARE
Problem: Patient Care Overview  Goal: Plan of Care/Patient Progress Review  OT: holding OT at this time, pt up with PT and will likely be able to progress with PT for strengthening and pain control. PT and pt both endorsing pain is major limiting factor, pt I at basline.

## 2018-01-19 NOTE — PROGRESS NOTES
01/19/18 0950   Quick Adds   Type of Visit Initial PT Evaluation       Present no   Language English   Living Environment   Lives With other (see comments)   Living Arrangements apartment   Home Accessibility stairs to enter home;stairs (1 railing present)   Number of Stairs to Enter Home 6  (5 into parents)   Number of Stairs Within Home 0   Stair Railings at Home outside, present on right side   Transportation Available family or friend will provide   Living Environment Comment Patient lives in an apartment with a roommate. He will not be present all the time as he works. Plans to likely go to her parents home where she will have more assist and can also remain on one level   Self-Care   Dominant Hand right   Usual Activity Tolerance good   Current Activity Tolerance fair   Regular Exercise yes   Activity/Exercise Type walking;strength training   Exercise Amount/Frequency 3-5 times/wk   Equipment Currently Used at Home none   Activity/Exercise/Self-Care Comment Typically quite active as patient works as a  at [employer]   Functional Level Prior   Ambulation 0-->independent   Transferring 0-->independent   Toileting 0-->independent   Bathing 0-->independent   Dressing 0-->independent   Eating 0-->independent   Communication 0-->understands/communicates without difficulty   Swallowing 0-->swallows foods/liquids without difficulty   Cognition 0 - no cognition issues reported   Fall history within last six months no   Which of the above functional risks had a recent onset or change? ambulation;transferring   General Information   Onset of Illness/Injury or Date of Surgery - Date 01/18/18   Referring Physician Richelle Ordoñez MD   Patient/Family Goals Statement Decrease pain   Pertinent History of Current Problem (include personal factors and/or comorbidities that impact the POC) 27 year old female who presents with abdominal pain and diarrhea for the past 4 days with acute  worsening of abdominal pain, found to have ruptured appendicitis. She underwent ex-lap by general surgery and will be admitted to SICU for post-op monitoring.     Precautions/Limitations fall precautions;abdominal precautions   Weight-Bearing Status - LUE full weight-bearing   Weight-Bearing Status - RUE full weight-bearing   Weight-Bearing Status - LLE full weight-bearing   Weight-Bearing Status - RLE full weight-bearing   General Observations Supine in bed upon arrival, pleasant and agreeable   General Info Comments IV, phillips, capnography   Cognitive Status Examination   Orientation orientation to person, place and time   Level of Consciousness alert   Follows Commands and Answers Questions 100% of the time;able to follow multistep instructions   Personal Safety and Judgment intact   Memory intact   Pain Assessment   Patient Currently in Pain Yes, see Vital Sign flowsheet   Integumentary/Edema   Integumentary/Edema no deficits were identifed   Posture    Posture Comments Mild trunk flexion throughout but d/t abdominal pain and tightness   Range of Motion (ROM)   ROM Comment Did not formally assess, demonstrates functional ROM with mobility   Strength   Strength Comments Did not formally assess, demonstrates functional strength with mobility   Bed Mobility   Bed Mobility Comments Completes supine<>sit transfer with use of log roll technique, side rail, HOB slightly elevated and SBA   Transfer Skills   Transfer Comments Completes sit<>stand transfer with CGA, no AD   Gait   Gait Comments Tolerates ambulating slowly in chavarria with CGA pushing IV pole. Very mild instability noted   Balance   Balance Comments Independent sitting balance, CGA for standing balance with light UE support from IV pole   Sensory Examination   Sensory Perception no deficits were identified   General Therapy Interventions   Planned Therapy Interventions bed mobility training;gait training;transfer training;risk factor education;home program  "guidelines;progressive activity/exercise   Clinical Impression   Criteria for Skilled Therapeutic Intervention yes, treatment indicated   PT Diagnosis impaired functional mobility   Influenced by the following impairments increased post-op pain, precautions, decreased activity tolerance   Functional limitations due to impairments impaired bed mobility, transfers and ambulation   Clinical Presentation Stable/Uncomplicated   Clinical Presentation Rationale 27 year old female who presents with abdominal pain and diarrhea for the past 4 days with acute worsening of abdominal pain, found to have ruptured appendicitis. She underwent ex-lap by general surgery and will be admitted to SICU for post-op monitoring.  Doing fairly well mobilizing overall   Clinical Decision Making (Complexity) Low complexity   Therapy Frequency` daily   Predicted Duration of Therapy Intervention (days/wks) 5 days   Anticipated Discharge Disposition Home with Assist  (Parent's home)   Risk & Benefits of therapy have been explained Yes   Patient, Family & other staff in agreement with plan of care Yes   [facility] TM \"6 Clicks\"   2016, Trustees of [facility], under license to [employer].  All rights reserved.   6 Clicks Short Forms Basic Mobility Inpatient Short Form   [facility]  \"6 Clicks\" V.2 Basic Mobility Inpatient Short Form   1. Turning from your back to your side while in a flat bed without using bedrails? 4 - None   2. Moving from lying on your back to sitting on the side of a flat bed without using bedrails? 4 - None   3. Moving to and from a bed to a chair (including a wheelchair)? 4 - None   4. Standing up from a chair using your arms (e.g., wheelchair, or bedside chair)? 4 - None   5. To walk in hospital room? 4 - None   6. Climbing 3-5 steps with a railing? 3 - A Little   Basic Mobility Raw Score (Score out of 24.Lower scores equate to lower levels of function) 23   Total Evaluation Time   Total " Evaluation Time (Minutes) 10

## 2018-01-20 ENCOUNTER — APPOINTMENT (OUTPATIENT)
Dept: GENERAL RADIOLOGY | Facility: CLINIC | Age: 28
End: 2018-01-20
Attending: STUDENT IN AN ORGANIZED HEALTH CARE EDUCATION/TRAINING PROGRAM
Payer: COMMERCIAL

## 2018-01-20 ENCOUNTER — APPOINTMENT (OUTPATIENT)
Dept: GENERAL RADIOLOGY | Facility: CLINIC | Age: 28
End: 2018-01-20
Attending: SURGERY
Payer: COMMERCIAL

## 2018-01-20 LAB
ANION GAP SERPL CALCULATED.3IONS-SCNC: 7 MMOL/L (ref 3–14)
BACTERIA SPEC CULT: ABNORMAL
BUN SERPL-MCNC: 8 MG/DL (ref 7–30)
CALCIUM SERPL-MCNC: 8.2 MG/DL (ref 8.5–10.1)
CHLORIDE SERPL-SCNC: 104 MMOL/L (ref 94–109)
CO2 SERPL-SCNC: 27 MMOL/L (ref 20–32)
CREAT SERPL-MCNC: 0.45 MG/DL (ref 0.52–1.04)
ERYTHROCYTE [DISTWIDTH] IN BLOOD BY AUTOMATED COUNT: 13.8 % (ref 10–15)
GFR SERPL CREATININE-BSD FRML MDRD: >90 ML/MIN/1.7M2
GLUCOSE BLDC GLUCOMTR-MCNC: 114 MG/DL (ref 70–99)
GLUCOSE BLDC GLUCOMTR-MCNC: 122 MG/DL (ref 70–99)
GLUCOSE BLDC GLUCOMTR-MCNC: 52 MG/DL (ref 70–99)
GLUCOSE BLDC GLUCOMTR-MCNC: 52 MG/DL (ref 70–99)
GLUCOSE SERPL-MCNC: 56 MG/DL (ref 70–99)
HCT VFR BLD AUTO: 38.3 % (ref 35–47)
HGB BLD-MCNC: 13 G/DL (ref 11.7–15.7)
LACTATE BLD-SCNC: 1 MMOL/L (ref 0.7–2)
Lab: ABNORMAL
MAGNESIUM SERPL-MCNC: 2.2 MG/DL (ref 1.6–2.3)
MCH RBC QN AUTO: 33.2 PG (ref 26.5–33)
MCHC RBC AUTO-ENTMCNC: 33.9 G/DL (ref 31.5–36.5)
MCV RBC AUTO: 98 FL (ref 78–100)
PHOSPHATE SERPL-MCNC: 1.7 MG/DL (ref 2.5–4.5)
PHOSPHATE SERPL-MCNC: 2 MG/DL (ref 2.5–4.5)
PLATELET # BLD AUTO: 215 10E9/L (ref 150–450)
POTASSIUM SERPL-SCNC: 3.4 MMOL/L (ref 3.4–5.3)
RBC # BLD AUTO: 3.92 10E12/L (ref 3.8–5.2)
SODIUM SERPL-SCNC: 138 MMOL/L (ref 133–144)
SPECIMEN SOURCE: ABNORMAL
WBC # BLD AUTO: 16.8 10E9/L (ref 4–11)

## 2018-01-20 PROCEDURE — 00000146 ZZHCL STATISTIC GLUCOSE BY METER IP

## 2018-01-20 PROCEDURE — 25000132 ZZH RX MED GY IP 250 OP 250 PS 637: Performed by: STUDENT IN AN ORGANIZED HEALTH CARE EDUCATION/TRAINING PROGRAM

## 2018-01-20 PROCEDURE — 25800025 ZZH RX 258: Performed by: STUDENT IN AN ORGANIZED HEALTH CARE EDUCATION/TRAINING PROGRAM

## 2018-01-20 PROCEDURE — 25000128 H RX IP 250 OP 636: Performed by: STUDENT IN AN ORGANIZED HEALTH CARE EDUCATION/TRAINING PROGRAM

## 2018-01-20 PROCEDURE — 40000556 ZZH STATISTIC PERIPHERAL IV START W US GUIDANCE

## 2018-01-20 PROCEDURE — 25800025 ZZH RX 258

## 2018-01-20 PROCEDURE — 84100 ASSAY OF PHOSPHORUS: CPT | Performed by: SURGERY

## 2018-01-20 PROCEDURE — 83605 ASSAY OF LACTIC ACID: CPT | Performed by: SURGERY

## 2018-01-20 PROCEDURE — 25000132 ZZH RX MED GY IP 250 OP 250 PS 637: Performed by: SURGERY

## 2018-01-20 PROCEDURE — 83735 ASSAY OF MAGNESIUM: CPT | Performed by: SURGERY

## 2018-01-20 PROCEDURE — 71045 X-RAY EXAM CHEST 1 VIEW: CPT

## 2018-01-20 PROCEDURE — 36415 COLL VENOUS BLD VENIPUNCTURE: CPT | Performed by: SURGERY

## 2018-01-20 PROCEDURE — 74018 RADEX ABDOMEN 1 VIEW: CPT

## 2018-01-20 PROCEDURE — 85027 COMPLETE CBC AUTOMATED: CPT | Performed by: SURGERY

## 2018-01-20 PROCEDURE — 80048 BASIC METABOLIC PNL TOTAL CA: CPT | Performed by: SURGERY

## 2018-01-20 PROCEDURE — 84132 ASSAY OF SERUM POTASSIUM: CPT | Performed by: SURGERY

## 2018-01-20 PROCEDURE — 25000125 ZZHC RX 250: Performed by: STUDENT IN AN ORGANIZED HEALTH CARE EDUCATION/TRAINING PROGRAM

## 2018-01-20 PROCEDURE — 12000008 ZZH R&B INTERMEDIATE UMMC

## 2018-01-20 RX ORDER — DEXTROSE MONOHYDRATE 25 G/50ML
INJECTION, SOLUTION INTRAVENOUS
Status: COMPLETED
Start: 2018-01-20 | End: 2018-01-20

## 2018-01-20 RX ORDER — DEXTROSE MONOHYDRATE 25 G/50ML
25-50 INJECTION, SOLUTION INTRAVENOUS
Status: DISCONTINUED | OUTPATIENT
Start: 2018-01-20 | End: 2018-01-24 | Stop reason: HOSPADM

## 2018-01-20 RX ORDER — NICOTINE POLACRILEX 4 MG
15-30 LOZENGE BUCCAL
Status: DISCONTINUED | OUTPATIENT
Start: 2018-01-20 | End: 2018-01-24 | Stop reason: HOSPADM

## 2018-01-20 RX ORDER — DEXTROSE MONOHYDRATE, SODIUM CHLORIDE, AND POTASSIUM CHLORIDE 50; 1.49; 4.5 G/1000ML; G/1000ML; G/1000ML
INJECTION, SOLUTION INTRAVENOUS CONTINUOUS
Status: DISCONTINUED | OUTPATIENT
Start: 2018-01-20 | End: 2018-01-24

## 2018-01-20 RX ORDER — SIMETHICONE 80 MG
80 TABLET,CHEWABLE ORAL EVERY 6 HOURS PRN
Status: DISCONTINUED | OUTPATIENT
Start: 2018-01-20 | End: 2018-01-24 | Stop reason: HOSPADM

## 2018-01-20 RX ADMIN — POTASSIUM PHOSPHATE, MONOBASIC AND POTASSIUM PHOSPHATE, DIBASIC 20 MMOL: 224; 236 INJECTION, SOLUTION INTRAVENOUS at 15:12

## 2018-01-20 RX ADMIN — PIPERACILLIN SODIUM AND TAZOBACTAM SODIUM 3.38 G: 36; 4.5 INJECTION, POWDER, LYOPHILIZED, FOR SOLUTION INTRAVENOUS at 05:40

## 2018-01-20 RX ADMIN — OXYCODONE HYDROCHLORIDE 10 MG: 5 TABLET ORAL at 04:25

## 2018-01-20 RX ADMIN — OXYCODONE HYDROCHLORIDE 10 MG: 5 TABLET ORAL at 08:10

## 2018-01-20 RX ADMIN — ACETAMINOPHEN 650 MG: 325 TABLET, FILM COATED ORAL at 13:14

## 2018-01-20 RX ADMIN — OXYCODONE HYDROCHLORIDE 10 MG: 5 TABLET ORAL at 00:59

## 2018-01-20 RX ADMIN — ENOXAPARIN SODIUM 40 MG: 40 INJECTION SUBCUTANEOUS at 13:14

## 2018-01-20 RX ADMIN — ACETAMINOPHEN 650 MG: 325 TABLET, FILM COATED ORAL at 19:10

## 2018-01-20 RX ADMIN — DEXTROSE MONOHYDRATE: 25 INJECTION, SOLUTION INTRAVENOUS at 11:15

## 2018-01-20 RX ADMIN — SODIUM CHLORIDE, POTASSIUM CHLORIDE, SODIUM LACTATE AND CALCIUM CHLORIDE: 600; 310; 30; 20 INJECTION, SOLUTION INTRAVENOUS at 04:27

## 2018-01-20 RX ADMIN — POTASSIUM CHLORIDE 20 MEQ: 750 TABLET, EXTENDED RELEASE ORAL at 13:14

## 2018-01-20 RX ADMIN — PIPERACILLIN SODIUM AND TAZOBACTAM SODIUM 3.38 G: 36; 4.5 INJECTION, POWDER, LYOPHILIZED, FOR SOLUTION INTRAVENOUS at 13:32

## 2018-01-20 RX ADMIN — ACETAMINOPHEN 650 MG: 325 TABLET, FILM COATED ORAL at 00:59

## 2018-01-20 RX ADMIN — POTASSIUM CHLORIDE, DEXTROSE MONOHYDRATE AND SODIUM CHLORIDE: 150; 5; 450 INJECTION, SOLUTION INTRAVENOUS at 11:16

## 2018-01-20 RX ADMIN — OXYCODONE HYDROCHLORIDE 10 MG: 5 TABLET ORAL at 19:10

## 2018-01-20 RX ADMIN — PIPERACILLIN SODIUM AND TAZOBACTAM SODIUM 3.38 G: 36; 4.5 INJECTION, POWDER, LYOPHILIZED, FOR SOLUTION INTRAVENOUS at 00:47

## 2018-01-20 RX ADMIN — OXYCODONE HYDROCHLORIDE 10 MG: 5 TABLET ORAL at 13:14

## 2018-01-20 RX ADMIN — SIMETHICONE CHEW TAB 80 MG 80 MG: 80 TABLET ORAL at 13:14

## 2018-01-20 RX ADMIN — PIPERACILLIN SODIUM AND TAZOBACTAM SODIUM 3.38 G: 36; 4.5 INJECTION, POWDER, LYOPHILIZED, FOR SOLUTION INTRAVENOUS at 19:09

## 2018-01-20 RX ADMIN — ACETAMINOPHEN 650 MG: 325 TABLET, FILM COATED ORAL at 08:10

## 2018-01-20 ASSESSMENT — VISUAL ACUITY: OU: NORMAL ACUITY

## 2018-01-20 NOTE — PLAN OF CARE
Problem: Patient Care Overview  Goal: Plan of Care/Patient Progress Review  A/ox4, VSS on 2L NC. C/o of abdominal and back pain, gave PRN Oxy x2 during shift, pt stated relief. Denies nausea, vomiting. Abdominal dressing CDI. Nunes has adequate UOP. No BM or passing gas yet. Sleeping in between cares. Callas appropriately and makes needs known. NPO except for ice chips. LR infusing at 100ml/hr through new L PIV. Will continue to monitor and follow POC.     Addendum: Encouraged the use of IS at the bedside. Pt stated having difficulty with still fully expanding her lungs/deep breathing.

## 2018-01-20 NOTE — PLAN OF CARE
"Problem: Pain, Acute (Adult)  Goal: Identify Related Risk Factors and Signs and Symptoms  Related risk factors and signs and symptoms are identified upon initiation of Human Response Clinical Practice Guideline (CPG).   Outcome: Improving  AVSS.  O2 sats 93% on 2L NC (89% on RA).  Abdomen soft/tender.  Abdominal dressing CDI.  Nunes with adequate urine output.  Pt states \"adequate\" pain control taking oxycodone 10mg q 3hrs prn and dilaudid 0.5mg IV q 2hrs prn BT pain.        "

## 2018-01-20 NOTE — PROGRESS NOTES
Focus:  Status  D:  Monitoring status  I:    Vitals taken         amb in halls times one with PT which pt had lots of pain and refused to walk until her pain was better controled. Pt said she will walk again on the evening shift.          said it was ok for pt to have to phillips until she was able to walk, nurse encourage pt to dc'd phillips but pt said she wanted to wait. Nurse informed oncoming nurse to dc phillips.         Mag 3.0, K 3.4 and phos 1.8.  Mag and phos was recheck.  K and phos was replaced and will be rechecked/ sliding scale.          said pt can have a few ice chips and a 1/2 of popsickle which pt had 3 for nurses  12 hr shift          abd drsg intact  A:   Denied resp distress/cp nor any signs observed  P:   Report to oncoming nurse

## 2018-01-20 NOTE — PROGRESS NOTES
Surgery Progress Note  1/20/2018     Subjective:  - Overnight team notified of VS triggering sepsis protocol, This AM team notified of pt c/o SOB. This was improved with deep breathing and IS.   - Pain well controlled. Denies N/V. passing flatus. Burping.    Objective:  Temp:  [97  F (36.1  C)-99.9  F (37.7  C)] 98.9  F (37.2  C)  Heart Rate:  [] 127  Resp:  [18-24] 22  BP: ()/(61-76) 120/76  SpO2:  [74 %-94 %] 94 %  I/O last 3 completed shifts:  In: 2326.67 [P.O.:180; I.V.:2146.67]  Out: 1450 [Urine:1450]    Gen: Awake, alert, NAD   Resp: CTAB, good air movement bilaterally. No rhonchi or crackles appreciated  Abd: Soft, mildly distended, appropriately tender  Ext: WWP  Dressing/Incision: C/d/i. No cellulitis.    BMP  Recent Labs  Lab 01/20/18  0835 01/20/18  0025 01/19/18  1236 01/19/18  0721 01/18/18  2140 01/18/18  2022 01/18/18  1256 01/18/18  0748     --   --  140  --   --  140 140   POTASSIUM 3.4  --   --  3.4  --  3.5 3.8 2.9*   CHLORIDE 104  --   --  110*  --   --  112* 113*   MOE 8.2*  --   --  7.6*  --   --  7.2* 7.0*   CO2 27  --   --  21  --   --  17* 15*   BUN 8  --   --  11  --   --  15 18   CR 0.45*  --   --  0.62  --   --  0.82 0.97   GLC 56*  --   --  104*  --   --  123* 141*   MAG 2.2  --  3.0* 3.0* 1.4* 1.4*  --  1.4*   PHOS 1.7* 2.0* 1.5* 1.8* 2.6 2.7  --  2.5     CBC  Recent Labs  Lab 01/20/18  0835 01/19/18  0721 01/18/18  0748 01/18/18  0553 01/18/18  0058   WBC 16.8* 10.0 3.9*  --  2.5*   RBC 3.92 3.60* 3.97  --  5.12   HGB 13.0 12.0 13.2 14.2 17.2*   HCT 38.3 34.7* 37.9  --  48.1*   MCV 98 96 96  --  94   MCH 33.2* 33.3* 33.2*  --  33.6*   MCHC 33.9 34.6 34.8  --  35.8   RDW 13.8 13.3 12.6  --  12.2    177 142*  --  162     INRNo lab results found in last 7 days.   AST/ALT & Alk Phos  Recent Labs  Lab 01/18/18  0748 01/18/18  0058   AST 13 16   ALT 9 15   ALKPHOS 39* 180*     Bili  Recent Labs   Lab Test  01/18/18   0748  01/18/18   0058   BILITOTAL  1.1  1.3   DBIL  " 0.7*   --      Lipase/Amlyase  Recent Labs  Lab 01/18/18  0058   LIPASE 61*       A/P: Yuliana Licona is a 27 year old previously healthy female POD #2 from ex lap, appendectomy and washout for perforated appendicitis. Clinically stable, however increasing WBC and sudden onset SOB in post-operative setting. Likely 2/2 to atelectasis given poor effort on incentive spirometry and reluctance to ambulate QID      - WBC daily - wbc up today 16.8. Will continue to trend. Pan culture if febrile.  - NPO - has gastric distension on CXR. Will obtain AXR to eval if diffusely dilated bowel.  - We do not want to advance her diet until she has evidence of antegrade function  - Remove phillips today  - BID dressing changes  - Continue with antibiotics  - IVF with D5 NaCl 20mEq KCl 100mL/h  - Ambulation QID or more is very important to return of bowel function and DVT risk reduction in post-operative setting  - Incentive spirometry 10x/hr  lovenox daily    D/w chief resident and staff.    Neli Edward MS3    ADDENDUM:  I examined the patient with the medical student above.  I addended and agree with the above assessment and plan.    Improving. Wbc rising. Pain improved. Burping but no nausea or emesis. Overall looks better.    /76 (BP Location: Right arm)  Pulse 135  Temp 98.9  F (37.2  C) (Oral)  Resp 22  Ht 1.727 m (5' 8\")  Wt 68 kg (150 lb)  SpO2 94%  BMI 22.81 kg/m2    Intake/Output Summary (Last 24 hours) at 01/20/18 1131  Last data filed at 01/20/18 0659   Gross per 24 hour   Intake          2266.67 ml   Output             1200 ml   Net          1066.67 ml     Resting quietly, no acute distress  nonlabored breathing, on room air  Belly soft, moderately distended, incision c/d/i. Packed wound    Plan as above.    Richelle Ordoñez MD  Surgery Resident      Call on-call surgery intern on [facility] if questions    Discussed with staff.      "

## 2018-01-20 NOTE — PLAN OF CARE
Problem: PT General Care Plan  Goal: PT target date for goal attainment  PT: patient with large amount of family and nursing suggested to return later. Unable to return. Cancelled PT today

## 2018-01-21 ENCOUNTER — APPOINTMENT (OUTPATIENT)
Dept: PHYSICAL THERAPY | Facility: CLINIC | Age: 28
End: 2018-01-21
Payer: COMMERCIAL

## 2018-01-21 LAB
ANION GAP SERPL CALCULATED.3IONS-SCNC: 8 MMOL/L (ref 3–14)
BUN SERPL-MCNC: 7 MG/DL (ref 7–30)
CALCIUM SERPL-MCNC: 7.8 MG/DL (ref 8.5–10.1)
CHLORIDE SERPL-SCNC: 104 MMOL/L (ref 94–109)
CO2 SERPL-SCNC: 27 MMOL/L (ref 20–32)
CREAT SERPL-MCNC: 0.45 MG/DL (ref 0.52–1.04)
ERYTHROCYTE [DISTWIDTH] IN BLOOD BY AUTOMATED COUNT: 14.1 % (ref 10–15)
GFR SERPL CREATININE-BSD FRML MDRD: >90 ML/MIN/1.7M2
GLUCOSE BLDC GLUCOMTR-MCNC: 102 MG/DL (ref 70–99)
GLUCOSE BLDC GLUCOMTR-MCNC: 108 MG/DL (ref 70–99)
GLUCOSE SERPL-MCNC: 107 MG/DL (ref 70–99)
HCT VFR BLD AUTO: 38 % (ref 35–47)
HGB BLD-MCNC: 12.6 G/DL (ref 11.7–15.7)
LACTATE BLD-SCNC: 0.9 MMOL/L (ref 0.7–2)
MAGNESIUM SERPL-MCNC: 2.2 MG/DL (ref 1.6–2.3)
MCH RBC QN AUTO: 33.2 PG (ref 26.5–33)
MCHC RBC AUTO-ENTMCNC: 33.2 G/DL (ref 31.5–36.5)
MCV RBC AUTO: 100 FL (ref 78–100)
PHOSPHATE SERPL-MCNC: 2.4 MG/DL (ref 2.5–4.5)
PLATELET # BLD AUTO: 241 10E9/L (ref 150–450)
POTASSIUM SERPL-SCNC: 3.3 MMOL/L (ref 3.4–5.3)
POTASSIUM SERPL-SCNC: 3.7 MMOL/L (ref 3.4–5.3)
RBC # BLD AUTO: 3.79 10E12/L (ref 3.8–5.2)
SODIUM SERPL-SCNC: 139 MMOL/L (ref 133–144)
WBC # BLD AUTO: 14.2 10E9/L (ref 4–11)

## 2018-01-21 PROCEDURE — 83605 ASSAY OF LACTIC ACID: CPT | Performed by: SURGERY

## 2018-01-21 PROCEDURE — 25000132 ZZH RX MED GY IP 250 OP 250 PS 637: Performed by: STUDENT IN AN ORGANIZED HEALTH CARE EDUCATION/TRAINING PROGRAM

## 2018-01-21 PROCEDURE — 25000125 ZZHC RX 250: Performed by: STUDENT IN AN ORGANIZED HEALTH CARE EDUCATION/TRAINING PROGRAM

## 2018-01-21 PROCEDURE — 40000193 ZZH STATISTIC PT WARD VISIT

## 2018-01-21 PROCEDURE — 84100 ASSAY OF PHOSPHORUS: CPT | Performed by: SURGERY

## 2018-01-21 PROCEDURE — 36415 COLL VENOUS BLD VENIPUNCTURE: CPT | Performed by: SURGERY

## 2018-01-21 PROCEDURE — 85027 COMPLETE CBC AUTOMATED: CPT | Performed by: SURGERY

## 2018-01-21 PROCEDURE — 25000128 H RX IP 250 OP 636: Performed by: STUDENT IN AN ORGANIZED HEALTH CARE EDUCATION/TRAINING PROGRAM

## 2018-01-21 PROCEDURE — 97116 GAIT TRAINING THERAPY: CPT | Mod: GP

## 2018-01-21 PROCEDURE — 97530 THERAPEUTIC ACTIVITIES: CPT | Mod: GP

## 2018-01-21 PROCEDURE — 00000146 ZZHCL STATISTIC GLUCOSE BY METER IP

## 2018-01-21 PROCEDURE — 83735 ASSAY OF MAGNESIUM: CPT | Performed by: SURGERY

## 2018-01-21 PROCEDURE — 80048 BASIC METABOLIC PNL TOTAL CA: CPT | Performed by: SURGERY

## 2018-01-21 PROCEDURE — 25800025 ZZH RX 258: Performed by: STUDENT IN AN ORGANIZED HEALTH CARE EDUCATION/TRAINING PROGRAM

## 2018-01-21 PROCEDURE — 25000132 ZZH RX MED GY IP 250 OP 250 PS 637: Performed by: SURGERY

## 2018-01-21 PROCEDURE — 12000008 ZZH R&B INTERMEDIATE UMMC

## 2018-01-21 PROCEDURE — 84132 ASSAY OF SERUM POTASSIUM: CPT | Performed by: SURGERY

## 2018-01-21 RX ORDER — CALCIUM CARBONATE 500 MG/1
1000 TABLET, CHEWABLE ORAL ONCE
Status: COMPLETED | OUTPATIENT
Start: 2018-01-21 | End: 2018-01-21

## 2018-01-21 RX ADMIN — OXYCODONE HYDROCHLORIDE 10 MG: 5 TABLET ORAL at 00:37

## 2018-01-21 RX ADMIN — ENOXAPARIN SODIUM 40 MG: 40 INJECTION SUBCUTANEOUS at 12:11

## 2018-01-21 RX ADMIN — OXYCODONE HYDROCHLORIDE 10 MG: 5 TABLET ORAL at 12:13

## 2018-01-21 RX ADMIN — CALCIUM CARBONATE (ANTACID) CHEW TAB 500 MG 1000 MG: 500 CHEW TAB at 10:15

## 2018-01-21 RX ADMIN — POTASSIUM CHLORIDE 20 MEQ: 750 TABLET, EXTENDED RELEASE ORAL at 16:32

## 2018-01-21 RX ADMIN — PIPERACILLIN SODIUM AND TAZOBACTAM SODIUM 3.38 G: 36; 4.5 INJECTION, POWDER, LYOPHILIZED, FOR SOLUTION INTRAVENOUS at 12:12

## 2018-01-21 RX ADMIN — ACETAMINOPHEN 650 MG: 325 TABLET, FILM COATED ORAL at 16:32

## 2018-01-21 RX ADMIN — POTASSIUM CHLORIDE, DEXTROSE MONOHYDRATE AND SODIUM CHLORIDE: 150; 5; 450 INJECTION, SOLUTION INTRAVENOUS at 12:12

## 2018-01-21 RX ADMIN — POTASSIUM CHLORIDE 20 MEQ: 750 TABLET, EXTENDED RELEASE ORAL at 12:12

## 2018-01-21 RX ADMIN — ACETAMINOPHEN 650 MG: 325 TABLET, FILM COATED ORAL at 22:18

## 2018-01-21 RX ADMIN — POTASSIUM PHOSPHATE, MONOBASIC AND POTASSIUM PHOSPHATE, DIBASIC 15 MMOL: 224; 236 INJECTION, SOLUTION INTRAVENOUS at 12:59

## 2018-01-21 RX ADMIN — OXYCODONE HYDROCHLORIDE 10 MG: 5 TABLET ORAL at 07:45

## 2018-01-21 RX ADMIN — ACETAMINOPHEN 650 MG: 325 TABLET, FILM COATED ORAL at 07:44

## 2018-01-21 RX ADMIN — PIPERACILLIN SODIUM AND TAZOBACTAM SODIUM 3.38 G: 36; 4.5 INJECTION, POWDER, LYOPHILIZED, FOR SOLUTION INTRAVENOUS at 00:39

## 2018-01-21 RX ADMIN — SIMETHICONE CHEW TAB 80 MG 80 MG: 80 TABLET ORAL at 07:45

## 2018-01-21 RX ADMIN — ACETAMINOPHEN 650 MG: 325 TABLET, FILM COATED ORAL at 12:13

## 2018-01-21 RX ADMIN — ACETAMINOPHEN 650 MG: 325 TABLET, FILM COATED ORAL at 00:36

## 2018-01-21 RX ADMIN — OXYCODONE HYDROCHLORIDE 10 MG: 5 TABLET ORAL at 22:18

## 2018-01-21 RX ADMIN — SIMETHICONE CHEW TAB 80 MG 80 MG: 80 TABLET ORAL at 14:39

## 2018-01-21 RX ADMIN — OXYCODONE HYDROCHLORIDE 10 MG: 5 TABLET ORAL at 03:54

## 2018-01-21 RX ADMIN — POTASSIUM CHLORIDE 40 MEQ: 750 TABLET, EXTENDED RELEASE ORAL at 10:15

## 2018-01-21 RX ADMIN — OXYCODONE HYDROCHLORIDE 10 MG: 5 TABLET ORAL at 16:32

## 2018-01-21 RX ADMIN — PIPERACILLIN SODIUM AND TAZOBACTAM SODIUM 3.38 G: 36; 4.5 INJECTION, POWDER, LYOPHILIZED, FOR SOLUTION INTRAVENOUS at 17:50

## 2018-01-21 RX ADMIN — SIMETHICONE CHEW TAB 80 MG 80 MG: 80 TABLET ORAL at 20:48

## 2018-01-21 RX ADMIN — PIPERACILLIN SODIUM AND TAZOBACTAM SODIUM 3.38 G: 36; 4.5 INJECTION, POWDER, LYOPHILIZED, FOR SOLUTION INTRAVENOUS at 06:04

## 2018-01-21 ASSESSMENT — VISUAL ACUITY: OU: NORMAL ACUITY

## 2018-01-21 NOTE — PLAN OF CARE
Problem: Pain, Acute (Adult)  Goal: Identify Related Risk Factors and Signs and Symptoms  Related risk factors and signs and symptoms are identified upon initiation of Human Response Clinical Practice Guideline (CPG).   Outcome: No Change  7pm to 7am Shift  B/P: 103/72, T: 96.9, P: 135, R: 18. Started on oxygen via facemask 3lpm, switched to 2lpm nasal cannula, oxygen saturations remaining in the mid 90's. C/O pain in abdomen, oxycodone given with some relief. Midline dressing DI. Up to bathroom with minimal assist. Blood sugar this . Appears to sleep in between cares. Continue to monitor and notify MD with any significant changes.

## 2018-01-21 NOTE — PROGRESS NOTES
Surgery Progress Note  1/21/2018     Subjective:  - JENNY overnight.  - Pain well-controlled, but does complain of discomfort 2/2 bloating. No nausea or vomiting. Burping, but not passing flatus, no BM.    Objective:  Temp:  [96.4  F (35.8  C)-98.4  F (36.9  C)] 96.4  F (35.8  C)  Heart Rate:  [] 91  Resp:  [18-20] 18  BP: (103-135)/(69-83) 111/76  SpO2:  [92 %-97 %] 97 %  I/O last 3 completed shifts:  In: 2546.67 [P.O.:50; I.V.:2496.67]  Out: 1150 [Urine:1150]    Gen: Awake, alert, NAD   Resp: NLB on 2L NC  Abd: Soft, moderate distension - worse in epigastric region, tender to palpation around incision sites  Ext: WWP  Dressing/Incision: re-dressed wound with overlying gauze - not packed, no surrounding erythema, no pustular discharge, clean base.    BMP    Recent Labs  Lab 01/21/18  0804 01/20/18  0835 01/19/18  0721 01/18/18 2022 01/18/18  1256    138 140  --  140   POTASSIUM 3.3* 3.4 3.4 3.5 3.8   CHLORIDE 104 104 110*  --  112*   MOE 7.8* 8.2* 7.6*  --  7.2*   CO2 27 27 21  --  17*   BUN 7 8 11  --  15   CR 0.45* 0.45* 0.62  --  0.82   * 56* 104*  --  123*     CBC    Recent Labs  Lab 01/21/18  0804 01/20/18  0835 01/19/18  0721 01/18/18  0748   WBC 14.2* 16.8* 10.0 3.9*   RBC 3.79* 3.92 3.60* 3.97   HGB 12.6 13.0 12.0 13.2   HCT 38.0 38.3 34.7* 37.9    98 96 96   MCH 33.2* 33.2* 33.3* 33.2*   MCHC 33.2 33.9 34.6 34.8   RDW 14.1 13.8 13.3 12.6    215 177 142*     INRNo lab results found in last 7 days.   AST/ALT & Alk Phos    Recent Labs  Lab 01/18/18  0748 01/18/18  0058   AST 13 16   ALT 9 15   ALKPHOS 39* 180*     Bili  Recent Labs   Lab Test  01/18/18   0748  01/18/18   0058   BILITOTAL  1.1  1.3   DBIL  0.7*   --      Lipase/Amlyase    Recent Labs  Lab 01/18/18  0058   LIPASE 61*       A/P: Yuliana Licona is a 27 year old previously healthy female admitted with perforated appendicitis. S/P exploratory laparotomy with washout of contaminated abdomen, appendectomy, lysis of  adhesions.    - NPO except ice chips, may have up to 3 popsicles daily  - We do not want to advance her diet until she has evidence of antegrade function  - BID dressing changes  - Continue with antibiotics  - IVF with D5 1/2NS + 20K at 100mL/h  - Encourage ambulation  - Incentive spirometry   - Lovenox daily  - Patient okay to shower  - Replacement of phosphorus, calcium, and potassium  - Simethicone PRN for gas discomfort  - If patient has nausea, will need NGT placed      D/w chief resident and staff.    Isatu Smith, DO  PGY1

## 2018-01-21 NOTE — PROGRESS NOTES
Focus:  Status  D:  Monitoring status  I:   Vitals taken, sat ranged from 75- 93 on 3L FM.  was informed about the 02 sat at 75, dr came to assess pt, got CXR times 1 and AXR.   said pt lungs had free flowing air heard and pt had lots of free air in her abd.  also said pt would possibly need a NGT if she had NV and to call [phone] for the on-call dr.         Pt HR ranged from , dr aware        K+ and Phos 1.7, replaced when med was available, see emar        Pt's was Horace around her neck and face        Nunes dc at 1000 and pt void afterward times 2, BSC commode in  for pt at night        Pt got 1 Popsickle and then  dc'd it but said pt can have a little ice.         1440 pt triggered the Lactic Acid protocol, vitals taken times 1 hr, labs drawn and  informed, the results 1.0  A:   Got pain med q4-5 hrs, see emar         Denied resp distress/cp nor any signs observed  P:  Report to oncoming nurse

## 2018-01-21 NOTE — PROGRESS NOTES
Focus:  Status  D:  Monitoring status  I:   Vitals taken,  , sat 89-94 on 2L        amb in halls with SBA and gait belt, was a little dizzy briefly but resolve when got back in bed, pt said she had just done the IS and thought that may have caused her to get dizzy. Sat after amb 96, . Will amb again with PT and the nursing staff.         K+ and phos replacing, rechecks ordered/protocol         Pain med give q4-6 hrs for abd pain         abd continued to be distended and round, dr said to continue to watch for NV, pt may possibly need NGT, no c/o voiced or observed.          Voiding spont'           Dr will do AM abd drsg change and nurse does the evening, no packing needed/dr           Ca 7.8, replaced with Tums times one          said it was ok for pt to shower, pt not up to it at this time  A::   At 1425 PT called the nurse to come to assist the pt , PT said that when pt was walking, she got really dizzy when pt got back to bed she c/o feeling really sick. After pt burped and spit in the bag said that helped.  Pt denied NV. Vitals were taken and  informed, Simethicone was given times 2. Dr said he will come to see pt. Pt also triggered the Lactic Acid Protocol.           Denied resp distress/cp nor any signs observed  P:   Report to oncoming nurse

## 2018-01-21 NOTE — PLAN OF CARE
Problem: PT General Care Plan  Goal: PT target date for goal attainment  PT:patient displayed increased weakness this afternoon and fast onset of nausea/sick feeling which resolved within 10 minutes    Discharge Planner PT   Patient plan for discharge: home  Current status: patient ambulated 200 feet using IV pole and minimal assist for HHA secondary to increased instability and weakness. Patient SBA with bed mobility and transfers.   Barriers to return to prior living situation: stairs and medical stability  Recommendations for discharge: home with home PT  Rationale for recommendations: improve endurance, strength to progress independence with functional mobility for safe return to community.        Entered by: Malu Beaver 01/21/2018 4:23 PM

## 2018-01-22 ENCOUNTER — APPOINTMENT (OUTPATIENT)
Dept: PHYSICAL THERAPY | Facility: CLINIC | Age: 28
End: 2018-01-22
Payer: COMMERCIAL

## 2018-01-22 LAB
ANION GAP SERPL CALCULATED.3IONS-SCNC: 7 MMOL/L (ref 3–14)
BUN SERPL-MCNC: 5 MG/DL (ref 7–30)
CALCIUM SERPL-MCNC: 7.9 MG/DL (ref 8.5–10.1)
CHLORIDE SERPL-SCNC: 103 MMOL/L (ref 94–109)
CO2 SERPL-SCNC: 26 MMOL/L (ref 20–32)
COPATH REPORT: NORMAL
CREAT SERPL-MCNC: 0.42 MG/DL (ref 0.52–1.04)
ERYTHROCYTE [DISTWIDTH] IN BLOOD BY AUTOMATED COUNT: 14 % (ref 10–15)
GFR SERPL CREATININE-BSD FRML MDRD: >90 ML/MIN/1.7M2
GLUCOSE SERPL-MCNC: 107 MG/DL (ref 70–99)
HCT VFR BLD AUTO: 36.9 % (ref 35–47)
HGB BLD-MCNC: 11.9 G/DL (ref 11.7–15.7)
MAGNESIUM SERPL-MCNC: 2 MG/DL (ref 1.6–2.3)
MCH RBC QN AUTO: 32.4 PG (ref 26.5–33)
MCHC RBC AUTO-ENTMCNC: 32.2 G/DL (ref 31.5–36.5)
MCV RBC AUTO: 101 FL (ref 78–100)
PHOSPHATE SERPL-MCNC: 2.7 MG/DL (ref 2.5–4.5)
PLATELET # BLD AUTO: 258 10E9/L (ref 150–450)
POTASSIUM SERPL-SCNC: 3.9 MMOL/L (ref 3.4–5.3)
RBC # BLD AUTO: 3.67 10E12/L (ref 3.8–5.2)
SODIUM SERPL-SCNC: 137 MMOL/L (ref 133–144)
WBC # BLD AUTO: 10.9 10E9/L (ref 4–11)

## 2018-01-22 PROCEDURE — 40000894 ZZH STATISTIC OT IP EVAL DEFER

## 2018-01-22 PROCEDURE — 25000132 ZZH RX MED GY IP 250 OP 250 PS 637: Performed by: STUDENT IN AN ORGANIZED HEALTH CARE EDUCATION/TRAINING PROGRAM

## 2018-01-22 PROCEDURE — 97530 THERAPEUTIC ACTIVITIES: CPT | Mod: GP | Performed by: PHYSICAL THERAPIST

## 2018-01-22 PROCEDURE — 97116 GAIT TRAINING THERAPY: CPT | Mod: GP | Performed by: PHYSICAL THERAPIST

## 2018-01-22 PROCEDURE — 25000125 ZZHC RX 250: Performed by: STUDENT IN AN ORGANIZED HEALTH CARE EDUCATION/TRAINING PROGRAM

## 2018-01-22 PROCEDURE — 25000128 H RX IP 250 OP 636: Performed by: STUDENT IN AN ORGANIZED HEALTH CARE EDUCATION/TRAINING PROGRAM

## 2018-01-22 PROCEDURE — 84100 ASSAY OF PHOSPHORUS: CPT | Performed by: SURGERY

## 2018-01-22 PROCEDURE — 85027 COMPLETE CBC AUTOMATED: CPT | Performed by: SURGERY

## 2018-01-22 PROCEDURE — 12000008 ZZH R&B INTERMEDIATE UMMC

## 2018-01-22 PROCEDURE — 83735 ASSAY OF MAGNESIUM: CPT | Performed by: SURGERY

## 2018-01-22 PROCEDURE — 25800025 ZZH RX 258: Performed by: STUDENT IN AN ORGANIZED HEALTH CARE EDUCATION/TRAINING PROGRAM

## 2018-01-22 PROCEDURE — 25000132 ZZH RX MED GY IP 250 OP 250 PS 637: Performed by: SURGERY

## 2018-01-22 PROCEDURE — 36415 COLL VENOUS BLD VENIPUNCTURE: CPT | Performed by: SURGERY

## 2018-01-22 PROCEDURE — 40000193 ZZH STATISTIC PT WARD VISIT: Performed by: PHYSICAL THERAPIST

## 2018-01-22 PROCEDURE — 80048 BASIC METABOLIC PNL TOTAL CA: CPT | Performed by: SURGERY

## 2018-01-22 RX ORDER — AMOXICILLIN 250 MG
1 CAPSULE ORAL AT BEDTIME
Status: DISCONTINUED | OUTPATIENT
Start: 2018-01-22 | End: 2018-01-22

## 2018-01-22 RX ORDER — BISACODYL 10 MG
10 SUPPOSITORY, RECTAL RECTAL DAILY PRN
Status: DISCONTINUED | OUTPATIENT
Start: 2018-01-22 | End: 2018-01-24 | Stop reason: HOSPADM

## 2018-01-22 RX ORDER — POLYETHYLENE GLYCOL 3350 17 G/17G
17 POWDER, FOR SOLUTION ORAL DAILY
Status: DISCONTINUED | OUTPATIENT
Start: 2018-01-22 | End: 2018-01-22

## 2018-01-22 RX ADMIN — OXYCODONE HYDROCHLORIDE 10 MG: 5 TABLET ORAL at 20:33

## 2018-01-22 RX ADMIN — POTASSIUM CHLORIDE, DEXTROSE MONOHYDRATE AND SODIUM CHLORIDE: 150; 5; 450 INJECTION, SOLUTION INTRAVENOUS at 13:19

## 2018-01-22 RX ADMIN — OXYCODONE HYDROCHLORIDE 10 MG: 5 TABLET ORAL at 03:45

## 2018-01-22 RX ADMIN — ACETAMINOPHEN 650 MG: 325 TABLET, FILM COATED ORAL at 13:19

## 2018-01-22 RX ADMIN — POTASSIUM PHOSPHATE, MONOBASIC AND POTASSIUM PHOSPHATE, DIBASIC 10 MMOL: 224; 236 INJECTION, SOLUTION INTRAVENOUS at 16:55

## 2018-01-22 RX ADMIN — PIPERACILLIN SODIUM AND TAZOBACTAM SODIUM 3.38 G: 36; 4.5 INJECTION, POWDER, LYOPHILIZED, FOR SOLUTION INTRAVENOUS at 06:01

## 2018-01-22 RX ADMIN — OXYCODONE HYDROCHLORIDE 10 MG: 5 TABLET ORAL at 16:54

## 2018-01-22 RX ADMIN — Medication 2 G: at 11:44

## 2018-01-22 RX ADMIN — POTASSIUM CHLORIDE 20 MEQ: 750 TABLET, EXTENDED RELEASE ORAL at 11:44

## 2018-01-22 RX ADMIN — ENOXAPARIN SODIUM 40 MG: 40 INJECTION SUBCUTANEOUS at 11:44

## 2018-01-22 RX ADMIN — PIPERACILLIN SODIUM AND TAZOBACTAM SODIUM 3.38 G: 36; 4.5 INJECTION, POWDER, LYOPHILIZED, FOR SOLUTION INTRAVENOUS at 00:09

## 2018-01-22 RX ADMIN — ACETAMINOPHEN 650 MG: 325 TABLET, FILM COATED ORAL at 06:59

## 2018-01-22 RX ADMIN — OXYCODONE HYDROCHLORIDE 10 MG: 5 TABLET ORAL at 09:55

## 2018-01-22 RX ADMIN — SIMETHICONE CHEW TAB 80 MG 80 MG: 80 TABLET ORAL at 08:55

## 2018-01-22 RX ADMIN — ACETAMINOPHEN 650 MG: 325 TABLET, FILM COATED ORAL at 20:36

## 2018-01-22 RX ADMIN — SIMETHICONE CHEW TAB 80 MG 80 MG: 80 TABLET ORAL at 20:33

## 2018-01-22 RX ADMIN — POTASSIUM CHLORIDE, DEXTROSE MONOHYDRATE AND SODIUM CHLORIDE: 150; 5; 450 INJECTION, SOLUTION INTRAVENOUS at 03:52

## 2018-01-22 ASSESSMENT — PAIN DESCRIPTION - DESCRIPTORS
DESCRIPTORS: DULL
DESCRIPTORS: DULL

## 2018-01-22 ASSESSMENT — VISUAL ACUITY: OU: NORMAL ACUITY

## 2018-01-22 NOTE — PROGRESS NOTES
Surgery Progress Note  1/21/2018     Subjective:  No events overnight. No N/V. Feels better today.    Objective:  Temp:  [96.4  F (35.8  C)-98.7  F (37.1  C)] 98.7  F (37.1  C)  Heart Rate:  [80-99] 80  Resp:  [16-22] 18  BP: (105-126)/(58-83) 125/77  SpO2:  [91 %-98 %] 95 %  I/O last 3 completed shifts:  In: 1770 [P.O.:170; I.V.:1600]  Out: 1190 [Urine:1190]    Gen: Awake, alert, NAD   Resp: NLB on RA  Abd: Soft, less distension today  Ext: WWP  Dressing/Incision: re-dressed wound with overlying gauze - not packed, no surrounding erythema, no pustular discharge, clean base.   BMP    Recent Labs  Lab 01/21/18  1534 01/21/18  0804 01/20/18  0835 01/19/18  0721  01/18/18  1256   NA  --  139 138 140  --  140   POTASSIUM 3.7 3.3* 3.4 3.4  < > 3.8   CHLORIDE  --  104 104 110*  --  112*   MOE  --  7.8* 8.2* 7.6*  --  7.2*   CO2  --  27 27 21  --  17*   BUN  --  7 8 11  --  15   CR  --  0.45* 0.45* 0.62  --  0.82   GLC  --  107* 56* 104*  --  123*   < > = values in this interval not displayed.  CBC    Recent Labs  Lab 01/21/18  0804 01/20/18  0835 01/19/18  0721 01/18/18  0748   WBC 14.2* 16.8* 10.0 3.9*   RBC 3.79* 3.92 3.60* 3.97   HGB 12.6 13.0 12.0 13.2   HCT 38.0 38.3 34.7* 37.9    98 96 96   MCH 33.2* 33.2* 33.3* 33.2*   MCHC 33.2 33.9 34.6 34.8   RDW 14.1 13.8 13.3 12.6    215 177 142*     INRNo lab results found in last 7 days.   AST/ALT & Alk Phos    Recent Labs  Lab 01/18/18  0748 01/18/18  0058   AST 13 16   ALT 9 15   ALKPHOS 39* 180*     Bili  Recent Labs   Lab Test  01/18/18   0748  01/18/18 0058   BILITOTAL  1.1  1.3   DBIL  0.7*   --      Lipase/Amlyase    Recent Labs  Lab 01/18/18 0058   LIPASE 61*       A/P: Yuliana Licona is a 27 year old previously healthy female admitted with perforated appendicitis. S/P exploratory laparotomy with washout of contaminated abdomen, appendectomy, lysis of adhesions.    Doing better today.    - Continue NPO except ice chips for now, may have up to 3  popsicles daily. If she passes gas, may consider advancing to clears and starting miralax  - BID dressing changes  - Abx course finished yesterday  - IVF with D5 1/2NS + 20K at 100mL/h  - Encourage ambulation  - Incentive spirometry   - Lovenox daily  - Patient okay to shower  - Replacement of phosphorus, calcium, and potassium  - Simethicone PRN for gas discomfort  - If patient has nausea, will need NGT placed      D/w chief resident and staff.    Sav Navarro MD  PGY-1 Surgery  pager [phone]  (6AM-5PM weekdays please page primary team, nights/wknds/holidays, page job code [phone])

## 2018-01-22 NOTE — PLAN OF CARE
Problem: Patient Care Overview  Goal: Plan of Care/Patient Progress Review  OT 7B: Defer- per chart review and discussion with PT, pt with no acute OT needs. Pt ambulated with CGA and mod I with ADLs. Pt primarily limited by pain at this time. PT to follow to address decreased activity tolerance for ADLs. Will complete orders.

## 2018-01-22 NOTE — PLAN OF CARE
Problem: Patient Care Overview  Goal: Plan of Care/Patient Progress Review  Outcome: No Change  A&Ox4, VSS on RA with O2 sats in mid 90s, using oxymask intermittently. Triggered sepsis at shift change earlier this evening, lactic acid 0.9. Abdominal pain managed with PRN PO oxy & PRN PO Tylenol. Denies nausea but frequent burping, discomfort/bloating in abdomen - PRN simethicone given. Denies SOB but c/o slight dyspnea/SOB with exertion. Up with SBA & 4W walker, ambulated the halls x1 tonight. Left PIV infusing D5 1/2NS with 20 KCl at 100 mL/hr with intermittent IVP abx Zosyn. K+ recheck this evening 3.7, replaced, recheck ordered for tomorrow morning. Midline incision CDI, wound care completed this evening. Tolerating ice chips & sips of water with meds. Voiding without difficulty, no BM, no gas, hypoactive BS. Continue to monitor & follow POC.

## 2018-01-22 NOTE — PLAN OF CARE
Problem: Pain, Acute (Adult)  Goal: Acceptable Pain Control/Comfort Level  Patient will demonstrate the desired outcomes by discharge/transition of care.   Outcome: Improving  VSS. O2 sats >92% on RA, IS encouraged while awake. Afebrile. Reports abd pain as well managed w/prn tylenol and oxycodone. Voiding adequate amounts. BS hypoactive, denies flatus. NPO w/chips, denies n/v. Up w/SBA and walker for support, OOB activity encouraged. Sat in chair for most of afternoon. Midline dressing c/d/i, to be changed in PM by RN, team completed during AM rounds. K, Mg replaced, recheck tomorrow. Phos replacement ordered from pharmacy. Pt able to make needs known. Cont POC.

## 2018-01-22 NOTE — PLAN OF CARE
Problem: Patient Care Overview  Goal: Plan of Care/Patient Progress Review  Discharge Planner PT   Patient plan for discharge: to parent's home, as she will have 24/7 assist there  Current status: PT 7B: Pt progressing in PT, pain improved along with activity tolerance, but overall she is yet a ways below her functional baseline. SBA supine>sit simulating recliner chair she prefers for home use. SBA sit<>stand. Pt yet benefits from walker support (4WW or FWW) and seated rest breaks after ~ 175 ft gait with CGA-SBA. Pace is very slow. Pt able to navigate up and down 8 stairs with CGA-SBA and railing support. Sats 92% on RA with session. Pt has good family assist for home mobility.   Barriers to return to prior living situation: reduced walking tolerance, balance, fatigue  Recommendations for discharge: recommend discharge to parent's home, pt will benefit from home vs OP PT to progress gait as pt yet requires walker; her balance and strength are below baseline  Rationale for recommendations: below baseline functional strength, stability and safety       Entered by: Olga Lidia Dobbins 01/22/2018 11:39 AM

## 2018-01-22 NOTE — PLAN OF CARE
Problem: Patient Care Overview  Goal: Plan of Care/Patient Progress Review  Outcome: No Change  AVSS.  Pt with better pain control overnight, Oxycodone x1 with relief.  Enc use of I.S. & CDB.  Pt with episode of desats into upper 80's on RA, but otherwise in mid/upper 90's on 1L.  BS hypo and not passing flatus.  Abdomen rounded and slightly firm.  No nausea.  Midline incision covered with no drainage.  Voided adequately but urine is very dark.  Needs encouragement to ambulate more but moving well when OOB.  Cont POC.

## 2018-01-23 ENCOUNTER — APPOINTMENT (OUTPATIENT)
Dept: PHYSICAL THERAPY | Facility: CLINIC | Age: 28
End: 2018-01-23
Payer: COMMERCIAL

## 2018-01-23 LAB
ANION GAP SERPL CALCULATED.3IONS-SCNC: 9 MMOL/L (ref 3–14)
BUN SERPL-MCNC: 4 MG/DL (ref 7–30)
CALCIUM SERPL-MCNC: 8 MG/DL (ref 8.5–10.1)
CHLORIDE SERPL-SCNC: 105 MMOL/L (ref 94–109)
CO2 SERPL-SCNC: 22 MMOL/L (ref 20–32)
CREAT SERPL-MCNC: 0.37 MG/DL (ref 0.52–1.04)
ERYTHROCYTE [DISTWIDTH] IN BLOOD BY AUTOMATED COUNT: 14 % (ref 10–15)
GFR SERPL CREATININE-BSD FRML MDRD: >90 ML/MIN/1.7M2
GLUCOSE SERPL-MCNC: 100 MG/DL (ref 70–99)
HCT VFR BLD AUTO: 39.2 % (ref 35–47)
HGB BLD-MCNC: 12.7 G/DL (ref 11.7–15.7)
MAGNESIUM SERPL-MCNC: 2.2 MG/DL (ref 1.6–2.3)
MCH RBC QN AUTO: 32.9 PG (ref 26.5–33)
MCHC RBC AUTO-ENTMCNC: 32.4 G/DL (ref 31.5–36.5)
MCV RBC AUTO: 102 FL (ref 78–100)
PHOSPHATE SERPL-MCNC: 2.6 MG/DL (ref 2.5–4.5)
PLATELET # BLD AUTO: 328 10E9/L (ref 150–450)
POTASSIUM SERPL-SCNC: 4.4 MMOL/L (ref 3.4–5.3)
RBC # BLD AUTO: 3.86 10E12/L (ref 3.8–5.2)
SODIUM SERPL-SCNC: 136 MMOL/L (ref 133–144)
WBC # BLD AUTO: 11.3 10E9/L (ref 4–11)

## 2018-01-23 PROCEDURE — 12000008 ZZH R&B INTERMEDIATE UMMC

## 2018-01-23 PROCEDURE — 97530 THERAPEUTIC ACTIVITIES: CPT | Mod: GP | Performed by: PHYSICAL THERAPIST

## 2018-01-23 PROCEDURE — 97116 GAIT TRAINING THERAPY: CPT | Mod: GP | Performed by: PHYSICAL THERAPIST

## 2018-01-23 PROCEDURE — 25800025 ZZH RX 258: Performed by: STUDENT IN AN ORGANIZED HEALTH CARE EDUCATION/TRAINING PROGRAM

## 2018-01-23 PROCEDURE — 80048 BASIC METABOLIC PNL TOTAL CA: CPT | Performed by: SURGERY

## 2018-01-23 PROCEDURE — 85027 COMPLETE CBC AUTOMATED: CPT | Performed by: SURGERY

## 2018-01-23 PROCEDURE — 25000128 H RX IP 250 OP 636: Performed by: STUDENT IN AN ORGANIZED HEALTH CARE EDUCATION/TRAINING PROGRAM

## 2018-01-23 PROCEDURE — 40000193 ZZH STATISTIC PT WARD VISIT: Performed by: PHYSICAL THERAPIST

## 2018-01-23 PROCEDURE — 84100 ASSAY OF PHOSPHORUS: CPT | Performed by: SURGERY

## 2018-01-23 PROCEDURE — 83735 ASSAY OF MAGNESIUM: CPT | Performed by: SURGERY

## 2018-01-23 PROCEDURE — 25000132 ZZH RX MED GY IP 250 OP 250 PS 637: Performed by: STUDENT IN AN ORGANIZED HEALTH CARE EDUCATION/TRAINING PROGRAM

## 2018-01-23 PROCEDURE — 25000125 ZZHC RX 250: Performed by: STUDENT IN AN ORGANIZED HEALTH CARE EDUCATION/TRAINING PROGRAM

## 2018-01-23 PROCEDURE — 36415 COLL VENOUS BLD VENIPUNCTURE: CPT | Performed by: SURGERY

## 2018-01-23 PROCEDURE — 25000132 ZZH RX MED GY IP 250 OP 250 PS 637: Performed by: SURGERY

## 2018-01-23 RX ORDER — POLYETHYLENE GLYCOL 3350 17 G/17G
17 POWDER, FOR SOLUTION ORAL DAILY
Status: DISCONTINUED | OUTPATIENT
Start: 2018-01-23 | End: 2018-01-24 | Stop reason: HOSPADM

## 2018-01-23 RX ADMIN — POTASSIUM CHLORIDE, DEXTROSE MONOHYDRATE AND SODIUM CHLORIDE: 150; 5; 450 INJECTION, SOLUTION INTRAVENOUS at 02:26

## 2018-01-23 RX ADMIN — OXYCODONE HYDROCHLORIDE 10 MG: 5 TABLET ORAL at 06:59

## 2018-01-23 RX ADMIN — SIMETHICONE CHEW TAB 80 MG 80 MG: 80 TABLET ORAL at 20:12

## 2018-01-23 RX ADMIN — OXYCODONE HYDROCHLORIDE 10 MG: 5 TABLET ORAL at 11:51

## 2018-01-23 RX ADMIN — OXYCODONE HYDROCHLORIDE 10 MG: 5 TABLET ORAL at 21:44

## 2018-01-23 RX ADMIN — ACETAMINOPHEN 650 MG: 325 TABLET, FILM COATED ORAL at 21:44

## 2018-01-23 RX ADMIN — SIMETHICONE CHEW TAB 80 MG 80 MG: 80 TABLET ORAL at 11:51

## 2018-01-23 RX ADMIN — SIMETHICONE CHEW TAB 80 MG 80 MG: 80 TABLET ORAL at 02:26

## 2018-01-23 RX ADMIN — POLYETHYLENE GLYCOL 3350 17 G: 17 POWDER, FOR SOLUTION ORAL at 08:47

## 2018-01-23 RX ADMIN — ACETAMINOPHEN 650 MG: 325 TABLET, FILM COATED ORAL at 06:59

## 2018-01-23 RX ADMIN — OXYCODONE HYDROCHLORIDE 10 MG: 5 TABLET ORAL at 01:14

## 2018-01-23 RX ADMIN — HYDROMORPHONE HYDROCHLORIDE 0.5 MG: 1 INJECTION, SOLUTION INTRAMUSCULAR; INTRAVENOUS; SUBCUTANEOUS at 13:57

## 2018-01-23 RX ADMIN — OXYCODONE HYDROCHLORIDE 10 MG: 5 TABLET ORAL at 17:38

## 2018-01-23 RX ADMIN — ENOXAPARIN SODIUM 40 MG: 40 INJECTION SUBCUTANEOUS at 11:51

## 2018-01-23 RX ADMIN — ACETAMINOPHEN 650 MG: 325 TABLET, FILM COATED ORAL at 17:38

## 2018-01-23 RX ADMIN — ACETAMINOPHEN 650 MG: 325 TABLET, FILM COATED ORAL at 11:51

## 2018-01-23 RX ADMIN — POTASSIUM CHLORIDE, DEXTROSE MONOHYDRATE AND SODIUM CHLORIDE: 150; 5; 450 INJECTION, SOLUTION INTRAVENOUS at 11:51

## 2018-01-23 RX ADMIN — POTASSIUM PHOSPHATE, MONOBASIC AND POTASSIUM PHOSPHATE, DIBASIC 10 MMOL: 224; 236 INJECTION, SOLUTION INTRAVENOUS at 15:00

## 2018-01-23 RX ADMIN — ACETAMINOPHEN 650 MG: 325 TABLET, FILM COATED ORAL at 01:14

## 2018-01-23 NOTE — PROGRESS NOTES
Surgery Progress Note  1/21/2018     Subjective:  No acute events overnight. No N/V. BM x3 overnight, passing gas.     Objective:  Temp:  [98.2  F (36.8  C)-98.7  F (37.1  C)] 98.5  F (36.9  C)  Heart Rate:  [89-92] 89  Resp:  [16-18] 16  BP: (115-124)/(75-86) 120/86  SpO2:  [94 %-97 %] 96 %  I/O last 3 completed shifts:  In: 2221.67 [P.O.:120; I.V.:2101.67]  Out: 2175 [Urine:2175]    Gen: Awake, alert, NAD   Resp: NLB on RA  Abd: Soft, less distension today  Ext: WWP  Dressing/Incision: re-dressed wound with overlying gauze - not packed, no surrounding erythema, no pustular discharge, clean base.   BMP    Recent Labs  Lab 01/22/18  0730 01/21/18  1534 01/21/18  0804 01/20/18  0835 01/19/18  0721     --  139 138 140   POTASSIUM 3.9 3.7 3.3* 3.4 3.4   CHLORIDE 103  --  104 104 110*   MOE 7.9*  --  7.8* 8.2* 7.6*   CO2 26  --  27 27 21   BUN 5*  --  7 8 11   CR 0.42*  --  0.45* 0.45* 0.62   *  --  107* 56* 104*     CBC    Recent Labs  Lab 01/22/18  0730 01/21/18  0804 01/20/18  0835 01/19/18  0721   WBC 10.9 14.2* 16.8* 10.0   RBC 3.67* 3.79* 3.92 3.60*   HGB 11.9 12.6 13.0 12.0   HCT 36.9 38.0 38.3 34.7*   * 100 98 96   MCH 32.4 33.2* 33.2* 33.3*   MCHC 32.2 33.2 33.9 34.6   RDW 14.0 14.1 13.8 13.3    241 215 177     INRNo lab results found in last 7 days.   AST/ALT & Alk Phos    Recent Labs  Lab 01/18/18  0748 01/18/18  0058   AST 13 16   ALT 9 15   ALKPHOS 39* 180*     Bili  Recent Labs   Lab Test  01/18/18   0748  01/18/18   0058   BILITOTAL  1.1  1.3   DBIL  0.7*   --      Lipase/Amlyase    Recent Labs  Lab 01/18/18  0058   LIPASE 61*       A/P: Yuliana Licona is a 27 year old previously healthy female admitted with perforated appendicitis. S/P exploratory laparotomy with washout of contaminated abdomen, appendectomy, lysis of adhesions.    Doing better today with evidence of antegrade bowel function    - Clears today  - Miralax  - BID dressing changes  - Abx course finished  - IVF  with D5 1/2NS + 20K at 100mL/h  - Encourage ambulation  - Incentive spirometry   - Lovenox daily  - Patient okay to shower  - Replacement of phosphorus, calcium, and potassium  - Simethicone PRN for gas discomfort      D/w chief resident and staff.    Sav Navarro MD  PGY-1 Surgery  pager [phone]  (6AM-5PM weekdays please page primary team, nights/wknds/holidays, page job [phone])      .

## 2018-01-23 NOTE — PLAN OF CARE
Problem: Patient Care Overview  Goal: Individualization & Mutuality  Outcome: No Change  Vitals:    01/22/18 0700 01/22/18 1648 01/22/18 1817 01/22/18 1946   BP: 124/77 115/75  122/85   BP Location: Right arm Right arm  Right arm   Pulse:       Resp: 18 16 16   Temp: 98.7  F (37.1  C) 98.2  F (36.8  C)  98.7  F (37.1  C)   TempSrc: Oral Oral  Oral   SpO2: 94% 97%  97%   Weight:   70.1 kg (154 lb 8 oz)    Height:         AVSS, pain mgd with prn pain med & tyl. Denies N/V. Ice chips & meds. Simethicone for gas discomfort. Ambulated with A1+ walker. Voiding adequately & no BM. Phos+ replaced. A&O times 4. Continue POC.

## 2018-01-23 NOTE — PLAN OF CARE
Problem: Pain, Acute (Adult)  Goal: Acceptable Pain Control/Comfort Level  Patient will demonstrate the desired outcomes by discharge/transition of care.   Outcome: Improving  VSS. O2 sats 97% on RA. LS diminished, IS encouraged when awake. Abd incision covered, provider to possibly remove staples and reassess plan of care this afternoon.  IVF at 100ml/hr. Pt voiding adequate amounts. Tolerating CLD, denies n/v. Intermittent flatus while sitting on the toilet, large loose BM this shift. Up w/SBA, ambulated in hallway w/walker. Pt reports abd pain as well managed w/oxycodone + tylenol prn. PRN simethicone administered for abd cramps w/relief. Resting between cares, able to make needs known.

## 2018-01-23 NOTE — PLAN OF CARE
Problem: Patient Care Overview  Goal: Plan of Care/Patient Progress Review  Outcome: No Change  Vital signs:  Temp: 98.5  F (36.9  C) Temp src: Oral BP: 120/86   Heart Rate: 89 Resp: 16 SpO2: 96 % O2 Device: None (Room air)     VSS on RA. PIV in left infusing D5 1/2NS+20 KCl @ 100mL/hr. Abdominal incision dressing marked, scant serosanguinous drainage. Pt c/o pain in abdomen, oxy given x1, tylenol given x1, simethicone given x1. Hypoactive BS, pt reports passing flatus when she is in the bathroom. Loose, small BM x2 overnight. Voiding adequate amounts. Up with SBA when ambulating to toilet, using walker when ambulating in hallway. NPO except ice chips/meds. Denies nausea. K, Mg, and Phos replaced yesterday, recheck this AM. Sleeping between cares.

## 2018-01-23 NOTE — PLAN OF CARE
Problem: Patient Care Overview  Goal: Plan of Care/Patient Progress Review  Discharge Planner PT   Patient plan for discharge: parent's home, will have 24/7 assist  Current status: PT 7B: Pt yet requiring FWW support (pt request) for hallway distances, feels she needs the added stability. Achieved 220 ft with FWW, SBA. Steady for short in distances < 20 ft with use of IV pole or railing along with CGA. Pt SBA to VIVIAN sit<>stand, simulates her recliner chair with bed mobility with SBA to get up, reports no plan to sleep in a bed initially at discharge. Recliner chair is on main level of parent's home.   Barriers to return to prior living situation: No major barriers. Making progress towards goals, has good caregiver support.   Recommendations for discharge: recommend discharge to parent's home, pt will benefit from home vs OP PT   Rationale for recommendations: recommend home or OP PT to progress gait as pt yet requires walker; her balance and strength are below baseline  Entered by: Olga Lidia Dobbins 01/23/2018 10:00 AM

## 2018-01-24 ENCOUNTER — APPOINTMENT (OUTPATIENT)
Dept: PHYSICAL THERAPY | Facility: CLINIC | Age: 28
End: 2018-01-24
Payer: COMMERCIAL

## 2018-01-24 VITALS
RESPIRATION RATE: 16 BRPM | DIASTOLIC BLOOD PRESSURE: 76 MMHG | WEIGHT: 154.5 LBS | TEMPERATURE: 98.9 F | BODY MASS INDEX: 23.42 KG/M2 | HEIGHT: 68 IN | OXYGEN SATURATION: 97 % | HEART RATE: 79 BPM | SYSTOLIC BLOOD PRESSURE: 122 MMHG

## 2018-01-24 PROBLEM — K35.32 PERFORATED APPENDICITIS: Status: ACTIVE | Noted: 2018-01-24

## 2018-01-24 LAB
BACTERIA SPEC CULT: NO GROWTH
ERYTHROCYTE [DISTWIDTH] IN BLOOD BY AUTOMATED COUNT: 13.9 % (ref 10–15)
HCT VFR BLD AUTO: 40.2 % (ref 35–47)
HGB BLD-MCNC: 12.8 G/DL (ref 11.7–15.7)
MCH RBC QN AUTO: 32.7 PG (ref 26.5–33)
MCHC RBC AUTO-ENTMCNC: 31.8 G/DL (ref 31.5–36.5)
MCV RBC AUTO: 103 FL (ref 78–100)
PLATELET # BLD AUTO: 365 10E9/L (ref 150–450)
RBC # BLD AUTO: 3.91 10E12/L (ref 3.8–5.2)
SPECIMEN SOURCE: NORMAL
WBC # BLD AUTO: 11.1 10E9/L (ref 4–11)

## 2018-01-24 PROCEDURE — 97116 GAIT TRAINING THERAPY: CPT | Mod: GP | Performed by: PHYSICAL THERAPIST

## 2018-01-24 PROCEDURE — 25000128 H RX IP 250 OP 636: Performed by: STUDENT IN AN ORGANIZED HEALTH CARE EDUCATION/TRAINING PROGRAM

## 2018-01-24 PROCEDURE — 25000128 H RX IP 250 OP 636: Performed by: SURGERY

## 2018-01-24 PROCEDURE — 85027 COMPLETE CBC AUTOMATED: CPT | Performed by: SURGERY

## 2018-01-24 PROCEDURE — 40000193 ZZH STATISTIC PT WARD VISIT: Performed by: PHYSICAL THERAPIST

## 2018-01-24 PROCEDURE — 25000132 ZZH RX MED GY IP 250 OP 250 PS 637: Performed by: STUDENT IN AN ORGANIZED HEALTH CARE EDUCATION/TRAINING PROGRAM

## 2018-01-24 PROCEDURE — 90686 IIV4 VACC NO PRSV 0.5 ML IM: CPT | Performed by: SURGERY

## 2018-01-24 PROCEDURE — 36415 COLL VENOUS BLD VENIPUNCTURE: CPT | Performed by: SURGERY

## 2018-01-24 PROCEDURE — 97530 THERAPEUTIC ACTIVITIES: CPT | Mod: GP | Performed by: PHYSICAL THERAPIST

## 2018-01-24 RX ORDER — OXYCODONE HYDROCHLORIDE 5 MG/1
5-10 TABLET ORAL EVERY 4 HOURS PRN
Status: DISCONTINUED | OUTPATIENT
Start: 2018-01-24 | End: 2018-01-24 | Stop reason: HOSPADM

## 2018-01-24 RX ORDER — NALOXONE HYDROCHLORIDE 0.4 MG/ML
.1-.4 INJECTION, SOLUTION INTRAMUSCULAR; INTRAVENOUS; SUBCUTANEOUS
Status: DISCONTINUED | OUTPATIENT
Start: 2018-01-24 | End: 2018-01-24 | Stop reason: HOSPADM

## 2018-01-24 RX ORDER — POLYETHYLENE GLYCOL 3350 17 G/17G
17 POWDER, FOR SOLUTION ORAL DAILY
Qty: 21 PACKET | Refills: 0 | Status: SHIPPED | OUTPATIENT
Start: 2018-01-24 | End: 2018-02-23

## 2018-01-24 RX ORDER — ACETAMINOPHEN 325 MG/1
975 TABLET ORAL 3 TIMES DAILY PRN
Qty: 100 TABLET | Refills: 0 | Status: SHIPPED | OUTPATIENT
Start: 2018-01-24 | End: 2018-02-23

## 2018-01-24 RX ORDER — OXYCODONE HYDROCHLORIDE 5 MG/1
5-10 TABLET ORAL EVERY 6 HOURS PRN
Qty: 25 TABLET | Refills: 0 | Status: ON HOLD | OUTPATIENT
Start: 2018-01-24 | End: 2018-02-07

## 2018-01-24 RX ADMIN — POLYETHYLENE GLYCOL 3350 17 G: 17 POWDER, FOR SOLUTION ORAL at 08:09

## 2018-01-24 RX ADMIN — INFLUENZA A VIRUS A/MICHIGAN/45/2015 X-275 (H1N1) ANTIGEN (FORMALDEHYDE INACTIVATED), INFLUENZA A VIRUS A/HONG KONG/4801/2014 X-263B (H3N2) ANTIGEN (FORMALDEHYDE INACTIVATED), INFLUENZA B VIRUS B/PHUKET/3073/2013 ANTIGEN (FORMALDEHYDE INACTIVATED), AND INFLUENZA B VIRUS B/BRISBANE/60/2008 ANTIGEN (FORMALDEHYDE INACTIVATED) 0.5 ML: 15; 15; 15; 15 INJECTION, SUSPENSION INTRAMUSCULAR at 15:03

## 2018-01-24 RX ADMIN — ACETAMINOPHEN 650 MG: 325 TABLET, FILM COATED ORAL at 08:09

## 2018-01-24 RX ADMIN — ENOXAPARIN SODIUM 40 MG: 40 INJECTION SUBCUTANEOUS at 13:14

## 2018-01-24 RX ADMIN — OXYCODONE HYDROCHLORIDE 10 MG: 5 TABLET ORAL at 13:14

## 2018-01-24 RX ADMIN — ACETAMINOPHEN 650 MG: 325 TABLET, FILM COATED ORAL at 17:14

## 2018-01-24 RX ADMIN — OXYCODONE HYDROCHLORIDE 10 MG: 5 TABLET ORAL at 08:09

## 2018-01-24 RX ADMIN — ACETAMINOPHEN 650 MG: 325 TABLET, FILM COATED ORAL at 13:14

## 2018-01-24 RX ADMIN — OXYCODONE HYDROCHLORIDE 10 MG: 5 TABLET ORAL at 17:13

## 2018-01-24 RX ADMIN — ACETAMINOPHEN 650 MG: 325 TABLET, FILM COATED ORAL at 01:36

## 2018-01-24 RX ADMIN — OXYCODONE HYDROCHLORIDE 10 MG: 5 TABLET ORAL at 01:36

## 2018-01-24 NOTE — PLAN OF CARE
Problem: Pain, Acute (Adult)  Goal: Acceptable Pain Control/Comfort Level  Patient will demonstrate the desired outcomes by discharge/transition of care.   Outcome: Improving  VSS. Pt reports abd pain, well controlled w/prn tylenol and oxycodone. Midline wet/dry dressing change completed after pt showered, plan for change this evening including teaching w/sister whom will assist with dressing changes. Voiding adequate amounts, passing flatus, no BM today. Up independently, use of walker for support. Tolerating regular diet, denies n/v. Plan for discharge this evening. Able to make needs known, cont POC.

## 2018-01-24 NOTE — PLAN OF CARE
Problem: Patient Care Overview  Goal: Plan of Care/Patient Progress Review  Outcome: Improving  VSS. New order for wet to dry dressing change BID no packing needed. To be clarified in AM. Midline incision dressing CDI. Wet to dry dressing change complete Kerlix in place, Wound bed pink moist. 2 staples intact on superior incision. Pt tolerated well. Pain managed with Oxy and Tylenol. Simethicone effective Q6H. BS hypoactive, +flatus. PIV in L arm infusing D5% .45% NS Kcl 20 at 100 ml/hr.

## 2018-01-24 NOTE — DISCHARGE SUMMARY
"[facility]  Discharge Summary  General Surgery     Yuliana Licona MRN# [id number]   YOB: 1990 Age: 27 year old     Date of Admission:  1/18/2018  Date of Discharge::  1/24/18  Admitting Physician:  Brianna Guthrie MD  Discharge Physician:  Dr. Otf Murphy  Primary Care Physician:        No Ref-Primary, Physician          Admission Diagnoses:   Perforated viscus [R19.8]  Perforated appendicitis          Discharge Diagnosis:   Perforated appendicitis         Procedures:   Procedure(s):  Exploratory Laparotomy, Appendectomy - Wound Class: IV-Dirty or Infected  Surgeon: Dr. Brianna Guthrie  Date: 1/18/18          Brief History of Illness:   Taken from Admission H&P:  \"26 yo otherwise healthy female presents with acute stabbing abdominal pain in RLQ since 1130PM today. States that she had been having mild abdominal pain and watery/mucous diarrhea since 3 days ago. Pain has actually been getting better over past 3 days until around 1130PM tonight when she acutely had severe stabbing abdominal pain in RLQ now extending to entire abdomen. Emesis x1 on Sunday but since then no further n/v. Does have some subjective fever/chills. Denies episodes like this in past. Denies personal or family history of IBD or malignancies.\"           Hospital Course:   After OR, she was admitted to the Surgical ICU for post-op monitoring, and then was transferred to the general surgical floor on 1/18. The rest of her post-op course was relatively uncomplicated except for an ileus which was treated conservatively and patient was able to tolerate a regular diet at the time of discharge. Miralax was used to help with bowel function. All but 2 of patient's staples were removed on 1/23, and the wound was cleaned, and patient was discharged with plans for twice a day wet-to-dry packing dressing changes.          Day of Discharge Physical Exam:   Blood pressure 122/76, pulse 79, temperature 98.9  F (37.2  C), " "temperature source Oral, resp. rate 16, height 1.727 m (5' 8\"), weight 70.1 kg (154 lb 8 oz), SpO2 97 %, not currently breastfeeding.    Gen: AAOx3, NAD  Pulm: Non-labored breathing  Abd: Soft, appropriately tender, no guarding   Incision C/D/I with few staples in place, wound with clean bases and no discharge or surrounding erythema  Ext:  Warm and well-perfused         Final Pathology Result:   SPECIMEN(S):   Appendix     FINAL DIAGNOSIS:   APPENDIX, APPENDECTOMY:   - Chronic appendicitis with eosinophils   - Periappendiceal soft tissue with acute and chronic inflammation and   granulation tissue formation   - Two benign lymph nodes (0/2)   - Negative for malignancy     I have personally reviewed all specimens and/or slides, including the   listed special stains, and used them   with my medical judgement to determine or confirm the final diagnosis.     Electronically signed out by:     Andrae Kaiser M.D., [facility]            Discharge Instructions and Follow-Up:     Discharge Procedure Orders  Physical Therapy Referral   Referral Type: Rehab Therapy Physical Therapy     Reason for your hospital stay   Order Comments: Ruptured appendicitis     Adult [facility] Follow-up and recommended labs and tests   Order Comments: Follow up with Dr. Guthrie in clinic , at [facility], in 2 weeks  to evaluate after surgery. No follow up labs or test are needed.    Appointments on [place] and/or [facility] (with [facility] or [facility] provider or service). Call [phone] if you haven't heard regarding these appointments within 7 days of discharge.     Wound care and dressings   Order Comments: Instructions to care for your wound at home: Please change your dressing twice a day. You will use wet-to-dry packing. Please use sterile gauze and moisten with sterile salt water, and use this to pack the wound. Cover the packing with gauze. You may remove packing and shower - letting soapy water run over wound, do not scrub.              Home " Health Care:   Not needed           Discharge Disposition:   Discharged to home      Condition at discharge: Stable         Consultations:   PHYSICAL THERAPY ADULT IP CONSULT  OCCUPATIONAL THERAPY ADULT IP CONSULT  VASCULAR ACCESS CARE ADULT IP CONSULT  VASCULAR ACCESS CARE ADULT IP CONSULT         Imaging Studies:     Results for orders placed or performed during the hospital encounter of 01/18/18   XR Abdomen 1 View    Narrative    Examination:  XR ABDOMEN 1 VW 1/18/2018 1:34 AM     Comparison: None.    History: abd pain eval for free air, include;     Findings: Single upright frontal radiograph of abdomen was obtained.  Lower part of the abdomen and pelvis are covered by shield.   Nonobstructive bowel gas pattern. There are no abnormal  calcifications. There are no abnormal soft tissue densities. There is  no free air. No evidence of pneumatosis. No portal venous gas. The  lung bases are unremarkable.      Impression    Impression:   Limited evaluation of the mid and upper abdomen and lower chest:  1. Nonobstructive bowel gas pattern.  2. No evidence for free air in the abdomen.    I have personally reviewed the examination and initial interpretation  and I agree with the findings.    ADRIANA ANTONIO MD   CT Abdomen Pelvis w Contrast     Value    Radiologist flags (Urgent)     Pneumoperitoneum and peritonitis, possible perforated    Narrative    EXAMINATION: CT ABDOMEN PELVIS W CONTRAST, 1/18/2018 3:09 AM    TECHNIQUE:  Helical CT images from the lung bases through the  symphysis pubis were obtained with IV contrast.   DLP: 577 mGy*cm  Contrast: 92 mL Isovue-370    HISTORY: diffuse abdominal pain, peritonitis;     COMPARISON: None available.    FINDINGS:    Abdomen/Pelvis:  Liver: Unremarkable.  Biliary: Unremarkable.  Pancreas: Unremarkable.  Spleen: Unremarkable.  Adrenal Glands: Unremarkable.  Urinary Tract: No hydronephrosis. No hydroureter. No renal calculi.  Reproductive Organs: Within normal  limits.    Bowel: Although evaluation of the appendix is due to extensive  inflammatory changes of mesentery, there is a tubular structure with  enhancing wall containing 2 hyperdense foci. This appearance is  suggestive of acute appendicitis with appendicolith possibly  perforated (series 3, image [id number]). Diffuse wall thickening and fold  thickening of small bowel throughout, it can be due to peritonitis or  enteritis. The colon is decompressed.    Peritoneum/Fluid: Moderate amount of free fluid in in the abdomen  between the bowel loops, within the pelvic and inferior to the liver.  Extraluminal air superior to the liver and stomach and in the lesser  sac. There is a rim-enhancing fluid collections in the pelvic  cul-de-sac suggestive of abscess formation, the largest measuring 2.4  x 1.8 cm in the right pelvic cul-de-sac (series 2, image 85).    Vessels: No aneurysmal dilatation of the abdominal aorta.   Lymph Nodes: Prominent mesenteric and retroperitoneal lymph nodes,  likely reactive.  Bones/Soft Tissues: No aggressive osseous lesions.    Lower Thorax:  No pleural effusion. Visualized lung is clear. The heart size is  within normal limits.      Impression    IMPRESSION:   1. Moderate amount of free fluid in the abdomen and pelvis in addition  to extraluminal air consistent with pneumoperitoneum likely due to  perforation of intra-abdominal organs. There are rim-enhancing fluid  collections in the pelvic cul-de-sac suggestive of abscess formation.  2. Extensive mesenteric fat stranding and enhancing peritoneum  consistent with peritonitis.  3. Although evaluation of the appendix is limited due to extensive  inflammatory changes of mesentery, there is a tubular structure  measuring 11 mm with enhancing wall containing 2 hyperdense foci.  These changes are highly suspicious for an acute appendicitis with  appendicolith, possibly perforated.    [Urgent Result: Pneumoperitoneum and peritonitis, possible  perforated  acute appendicitis]    Finding was identified on 1/18/2018 2:56 AM.     Dr. Palencia was contacted by Dr. Gonzalez at 1/18/2018 3:00 AM and  verbalized understanding of the urgent finding.      I have personally reviewed the examination and initial interpretation  and I agree with the findings.    ADRIANA ANTONIO MD   XR Chest Port 1 View    Narrative    XR CHEST PORT 1 VW 1/20/2018 11:30 AM    History: shortness of breath;     Comparison: None.    Findings: Lung volumes. Small bilateral pleural effusions with  associated bibasilar atelectasis/consolidation, left greater than  right. No appreciable pneumothorax. Bones, subcutaneous soft tissues,  visualized upper abdomen are unremarkable except for gas-filled  stomach.      Impression    Impression: Low lung volumes, with small bilateral pleural effusions  and left greater than right basilar atelectasis/consolidation.    RICKIE ARRINGTON   XR Abdomen Port 1 View    Narrative    Exam:  XR ABDOMEN PORT F1 VW, 1/20/2018 1:22 PM    History: eval abdominal distension;     Comparison:  CT abdomen/pelvis from 1/18/2018.    Findings:  Portable supine view of the abdomen. Marked distention of  the stomach with dilation of multiple, thickened small bowel loops. No  definite pneumatosis or portal venous gas. Skin staples overlying the  low abdomen. No abnormal calcifications. The visualized bones are  unremarkable. Soft tissue anasarca.      Impression    Impression:    Multiple abnormally dilated, thickened loops of small bowel are  similar in appearance to CT from 1/18/2014.    I have personally reviewed the examination and initial interpretation  and I agree with the findings.    RICKIE ARRINGTON              Medications Prior to Admission:     Prescriptions Prior to Admission   Medication Sig Dispense Refill Last Dose     fexofenadine (ALLEGRA ALLERGY) 180 MG tablet Take 1 tablet PO BID 60 tablet 3      cetirizine HCl 10 MG CAPS Take 1 tablet PO BID 60 capsule 3                Discharge Medications:     Current Discharge Medication List      START taking these medications    Details   acetaminophen (TYLENOL) 325 MG tablet Take 3 tablets (975 mg) by mouth 3 times daily as needed for mild pain or fever  Qty: 100 tablet, Refills: 0    Associated Diagnoses: Ruptured appendicitis      polyethylene glycol (MIRALAX/GLYCOLAX) Packet Take 17 g by mouth daily  Qty: 21 packet, Refills: 0    Associated Diagnoses: Ruptured appendicitis      oxyCODONE IR (ROXICODONE) 5 MG tablet Take 1-2 tablets (5-10 mg) by mouth every 6 hours as needed for moderate to severe pain  Qty: 25 tablet, Refills: 0    Associated Diagnoses: Ruptured appendicitis         CONTINUE these medications which have NOT CHANGED    Details   fexofenadine (ALLEGRA ALLERGY) 180 MG tablet Take 1 tablet PO BID  Qty: 60 tablet, Refills: 3    Associated Diagnoses: Urticaria      cetirizine HCl 10 MG CAPS Take 1 tablet PO BID  Qty: 60 capsule, Refills: 3    Associated Diagnoses: Urticaria             Pt was seen and discussed with Dr. Murphy on 1/24/18    Sav Navarro MD  PGY-1 Surgery  pager [phone]    Isatu Smith DO  PGY1

## 2018-01-24 NOTE — PLAN OF CARE
Problem: Patient Care Overview  Goal: Plan of Care/Patient Progress Review  Outcome: No Change  Vital signs:  Temp: 98.4  F (36.9  C) Temp src: Oral BP: 120/83 Pulse: 79 Heart Rate: 78 Resp: 16 SpO2: 99 % O2 Device: None (Room air)     VSS on RA. PIV in left infusing D5 1/2NS+20 KCl @ 100mL/hr. Abdominal incision c/d/i, packed by MD yesterday afternoon. Dressing change orders need to be clarified as it says no packing is needed. Pt c/o pain in abdomen, oxy given x1, tylenol given x1. Hypoactive BS, pt reports passing flatus, 1 medium, loose BM overnight. Voiding adequate amounts. Up with SBA. On clear liquid diet. Denies nausea. Sleeping between cares.

## 2018-01-24 NOTE — PROGRESS NOTES
SPIRITUAL HEALTH SERVICES  SPIRITUAL ASSESSMENT Progress Note  [facility] ([place] 7B     REFERRAL SOURCE: Hospital  initiative    Yuliana shared details of events leading to hospitalization. She is enthusiastic about anticipated discharge and will be spending the next few weeks recovering at her parents in [place].    PLAN: Pt will be discharging.    Juan Blair  Chaplain Resident  Pager [phone]

## 2018-01-24 NOTE — PROGRESS NOTES
Care Coordinator Progress Note     Admission Date/Time:  1/18/2018  Attending MD:  Brianna Guthrie MD     Data  Chart reviewed, discussed with interdisciplinary team.   Patient was admitted for:    Perforated viscus  Ruptured appendicitis.    Concerns with insurance coverage for discharge needs: None.  Current Living Situation: Patient lives with family.  Support System: Supportive and Involved  Services Involved: no services involved prior to admission  Transportation: Family or Friend will provide  Barriers to Discharge: medical clearance    Coordination of Care and Referrals: Provided patient/family with options for Outpatient Rehab.        Assessment  Patient is a 27 year old female with no PMH who underwent an exploratory laparotomy with washout of contaminated abdomen, appendectomy, lysis of adhesions.  Patient lives in the [place], but will be discharging to her parents home in [place] for a couple weeks post discharge.  Patients incision is open and requiring wet to dry dressings BID.  She is also working with PT who is recommending outpatient therapy at discharge.  Met with patient at the bedside to introduce RNCC role and discuss discharge planning.  Discussed therapy recommendations, patient agreeable to OP therapy but not sure if she will need it as she is feeling better each day. Referral for OP therapy placed.  Patient reports that her mom is planning on learning how to do the dressing change.  Patient does not have a PCP and would like [place] [facility]  Called and had patients f/u with general surgery and appointment to establish primary care with Dr. Burrell arranged for 2/8.  Patients mom to transport her to home this evening.             Plan  Anticipated Discharge Date: 1/24/2018  Anticipated Discharge Plan:  Home with outpatient therapy and family assist and OP follow up.    Myriam Elmore, SERVANDO  [phone]

## 2018-01-24 NOTE — PLAN OF CARE
Problem: Patient Care Overview  Goal: Plan of Care/Patient Progress Review  Discharge Planner PT   Patient plan for discharge: home with parent's assist, open to OP PT referral in her area (from [place])  Current status: PT 7B: Pt continues to prefer walker use for longer distances beyond bed to bathroom distances. Pt SBA with FWW, CGA-SBA short distances ~10-15 ft with either single hallway railing support or no UE support. Pt's family has FWW she can use upon discharge home. Pt is feeling good in respect to discharge to home. Declined to re-attempt stairs as hoping to conserve energy for showering today and likely discharge home. PT and pt comfortable with pt's progress on the stairs.   Barriers to return to prior living situation: as pt progressing well in PT, recently safe navigating stairs, walking with assist and walker, and has good home caregiver support, no barriers present  Recommendations for discharge: home with family assist, OP PT  Rationale for recommendations: below baseline balance, strength and activity tolerance       Entered by: Olga Lidia Dobbins 01/24/2018 11:07 AM

## 2018-01-24 NOTE — PROGRESS NOTES
Surgery Progress Note  1/24/2018     Subjective:  No acute events overnight. No N/V. BM x4 overnight, passing gas. Tolerating clears well.    Objective:  Temp:  [98.1  F (36.7  C)-98.7  F (37.1  C)] 98.7  F (37.1  C)  Pulse:  [79] 79  Heart Rate:  [78-91] 83  Resp:  [16] 16  BP: (120-124)/(77-83) 121/77  SpO2:  [97 %-99 %] 99 %  I/O last 3 completed shifts:  In: 2927 [P.O.:840; I.V.:2087]  Out: 1850 [Urine:1850]    Gen: Awake, alert, NAD   Resp: NLB on RA  Abd: Soft, less distension today, appropriately tender around incision  Ext: WWP  Dressing/Incision: open wound with 2 staples remaining superiorly, packed;  No surrounding erythema or swelling  BMP    Recent Labs  Lab 01/23/18  0827 01/22/18  0730 01/21/18  1534 01/21/18  0804 01/20/18  0835    137  --  139 138   POTASSIUM 4.4 3.9 3.7 3.3* 3.4   CHLORIDE 105 103  --  104 104   MOE 8.0* 7.9*  --  7.8* 8.2*   CO2 22 26  --  27 27   BUN 4* 5*  --  7 8   CR 0.37* 0.42*  --  0.45* 0.45*   * 107*  --  107* 56*     CBC    Recent Labs  Lab 01/24/18  0715 01/23/18  0827 01/22/18  0730 01/21/18  0804   WBC 11.1* 11.3* 10.9 14.2*   RBC 3.91 3.86 3.67* 3.79*   HGB 12.8 12.7 11.9 12.6   HCT 40.2 39.2 36.9 38.0   * 102* 101* 100   MCH 32.7 32.9 32.4 33.2*   MCHC 31.8 32.4 32.2 33.2   RDW 13.9 14.0 14.0 14.1    328 258 241     INRNo lab results found in last 7 days.   AST/ALT & Alk Phos    Recent Labs  Lab 01/18/18  0748 01/18/18  0058   AST 13 16   ALT 9 15   ALKPHOS 39* 180*     Bili  Recent Labs   Lab Test  01/18/18   0748  01/18/18   0058   BILITOTAL  1.1  1.3   DBIL  0.7*   --      Lipase/Amlyase    Recent Labs  Lab 01/18/18  0058   LIPASE 61*       A/P: Yuliana Licona is a 27 year old previously healthy female admitted with perforated appendicitis. S/P exploratory laparotomy with washout of contaminated abdomen, appendectomy, lysis of adhesions. Wound opened up at bedside on 1/23 with removal of all but two staples due to fibrinous material and  packed wet-to-dry.    Overall doing well.    - Advance diet today  - Miralax  - BID dressing changes  - Abx course finished  - d/c IVF  - Encourage ambulation  - Incentive spirometry   - Lovenox daily  - Patient okay to shower  - Replacement of phosphorus, calcium, and potassium  - Simethicone PRN for gas discomfort      D/w chief resident and staff.    Sav Navarro MD  PGY-1 Surgery  pager [phone]  (6AM-5PM weekdays please page primary team, nights/wknds/holidays, page job code [phone])      .

## 2018-01-25 ENCOUNTER — CARE COORDINATION (OUTPATIENT)
Dept: SURGERY | Facility: CLINIC | Age: 28
End: 2018-01-25

## 2018-01-25 NOTE — PROGRESS NOTES
Yuliana Licona is a patient of Dr. Brianna Guthrie that underwent a Exploratory laparotomy, appendectomy approximately 7 days ago.  Attempted to contact patient via telephone for a status update and review post op teaching. Was unable to leave a VM as her mailbox has not been set up. Will attempt to contact patient at a later date/time.    Of note:  Pathology:  On discharge summary  Wound:  Staples (x2), wet/dry dressing changes BID  Follow-up:  Patient was scheduled with [facility] 2/8, because she has staples her follow up has been changed to 1/31 at 8 am with Dr. Azul  Restrictions:  - No lifting, pushing, pulling more than 15-20 pounds for 3-4 weeks  New medications:  Tylenol, Miralax, Oxycodone

## 2018-01-25 NOTE — LETTER
Patient:  Yuliana Snow  :   1990  MRN:     [id number]        Ms.Kacie Snow  [address]   [facility] [address]        2018    Dear ,    We are writing to inform you of your pathology results following your recent surgery for your appendix. The results show chronic appendicitis with acute and chronic inflammation. This is basically stating that you had a sick appendix that was inflamed. It also shows the appendix was benign (no malignancy seen). This is a normal finding following the surgery that you had. No follow up is needed based on the pathology.    With any questions or concerns, please don't hesitate to contact the General Surgery Clinic at [phone], option 3.    Sincerely,        Dr. Jayda Juares                      Patient Name: YULIANA SNOW   MR#: [id number]   Specimen #:    Collected: 2018   Received: 2018   Reported: 2018 11:05   Ordering Phy(s): JAYDA JUARES     For improved result formatting, select 'View Enhanced Report Format' under    Linked Documents section.     SPECIMEN(S):   Appendix     FINAL DIAGNOSIS:   APPENDIX, APPENDECTOMY:   - Chronic appendicitis with eosinophils   - Periappendiceal soft tissue with acute and chronic inflammation and   granulation tissue formation   - Two benign lymph nodes (0/2)   - Negative for malignancy     I have personally reviewed all specimens and/or slides, including the   listed special stains, and used them   with my medical judgement to determine or confirm the final diagnosis.     Electronically signed out by:     Andrae Kaiser M.D., [facility]

## 2018-01-25 NOTE — PLAN OF CARE
Problem: Patient Care Overview  Goal: Plan of Care/Patient Progress Review  Outcome: Improving  Pt.'s sister came to drive her home. Wound  Cares shown to sister. Supplies sent with patient. Iv was dc'd. Told to follow-up in clinic in 2 weeks. Told to call if wound  develops soupy drainage or has a fever.  Pt. Went to pharmacy and front door with transport. Discharge instructions reviewed.

## 2018-01-29 ENCOUNTER — NURSE TRIAGE (OUTPATIENT)
Dept: NURSING | Facility: CLINIC | Age: 28
End: 2018-01-29

## 2018-01-29 NOTE — TELEPHONE ENCOUNTER
"  Additional Information    Health Information question, no triage required and triager able to answer question     Caller was looking for saline syringes . \"I was sent home with pre filled ones after surgery. I need more, the pharmacy says I need and RX. \" I gave number to [facility] and ER.    Protocols used: INFORMATION ONLY CALL-ADULT-    "

## 2018-01-29 NOTE — PROGRESS NOTES
Attempted to contact patient x 2. Left a message asking her to call back when convenient. GS number provided.

## 2018-01-30 ENCOUNTER — TELEPHONE (OUTPATIENT)
Dept: SURGERY | Facility: CLINIC | Age: 28
End: 2018-01-30

## 2018-01-30 NOTE — TELEPHONE ENCOUNTER
Pre Visit Call and Assessment    Date of call:  01/30/2018    Phone numbers:  Home number on file [phone] (home)    Reached patient/confirmed appointment:  No - unable to leave voicemail: no voicemail available  Patient care team/Primary provider:  No Ref-Primary, Physician  Referred to:  Dr. Juan F Azul    Reason for visit: Post hospital follow up appendix

## 2018-02-02 ENCOUNTER — TRANSFERRED RECORDS (OUTPATIENT)
Dept: HEALTH INFORMATION MANAGEMENT | Facility: CLINIC | Age: 28
End: 2018-02-02

## 2018-02-03 ENCOUNTER — HOSPITAL ENCOUNTER (INPATIENT)
Facility: CLINIC | Age: 28
LOS: 4 days | Discharge: HOME HEALTH | End: 2018-02-07
Attending: SURGERY | Admitting: SURGERY
Payer: COMMERCIAL

## 2018-02-03 DIAGNOSIS — K35.32 RUPTURED APPENDICITIS: ICD-10-CM

## 2018-02-03 DIAGNOSIS — K35.32 PERFORATED APPENDICITIS: Primary | ICD-10-CM

## 2018-02-03 PROBLEM — K65.1 ABSCESS, INTRA-ABDOMINAL, POSTOPERATIVE (H): Status: ACTIVE | Noted: 2018-02-03

## 2018-02-03 PROBLEM — T81.43XA ABSCESS, INTRA-ABDOMINAL, POSTOPERATIVE (H): Status: ACTIVE | Noted: 2018-02-03

## 2018-02-03 LAB
ANION GAP SERPL CALCULATED.3IONS-SCNC: 8 MMOL/L (ref 3–14)
BASOPHILS # BLD AUTO: 0 10E9/L (ref 0–0.2)
BASOPHILS NFR BLD AUTO: 0 %
BUN SERPL-MCNC: 6 MG/DL (ref 7–30)
CALCIUM SERPL-MCNC: 8.3 MG/DL (ref 8.5–10.1)
CHLORIDE SERPL-SCNC: 106 MMOL/L (ref 94–109)
CO2 SERPL-SCNC: 23 MMOL/L (ref 20–32)
CREAT SERPL-MCNC: 0.52 MG/DL (ref 0.52–1.04)
DIFFERENTIAL METHOD BLD: ABNORMAL
EOSINOPHIL # BLD AUTO: 0 10E9/L (ref 0–0.7)
EOSINOPHIL NFR BLD AUTO: 0 %
ERYTHROCYTE [DISTWIDTH] IN BLOOD BY AUTOMATED COUNT: 12.9 % (ref 10–15)
GFR SERPL CREATININE-BSD FRML MDRD: >90 ML/MIN/1.7M2
GLUCOSE SERPL-MCNC: 107 MG/DL (ref 70–99)
HCT VFR BLD AUTO: 31.5 % (ref 35–47)
HGB BLD-MCNC: 10 G/DL (ref 11.7–15.7)
INR PPP: 1.37 (ref 0.86–1.14)
LYMPHOCYTES # BLD AUTO: 1.6 10E9/L (ref 0.8–5.3)
LYMPHOCYTES NFR BLD AUTO: 10.3 %
MAGNESIUM SERPL-MCNC: 1.7 MG/DL (ref 1.6–2.3)
MCH RBC QN AUTO: 32.3 PG (ref 26.5–33)
MCHC RBC AUTO-ENTMCNC: 31.7 G/DL (ref 31.5–36.5)
MCV RBC AUTO: 102 FL (ref 78–100)
MONOCYTES # BLD AUTO: 0.5 10E9/L (ref 0–1.3)
MONOCYTES NFR BLD AUTO: 3.4 %
NEUTROPHILS # BLD AUTO: 13.7 10E9/L (ref 1.6–8.3)
NEUTROPHILS NFR BLD AUTO: 86.3 %
PHOSPHATE SERPL-MCNC: 3.6 MG/DL (ref 2.5–4.5)
PLATELET # BLD AUTO: 433 10E9/L (ref 150–450)
PLATELET # BLD EST: ABNORMAL 10*3/UL
POTASSIUM SERPL-SCNC: 3.8 MMOL/L (ref 3.4–5.3)
RBC # BLD AUTO: 3.1 10E12/L (ref 3.8–5.2)
SODIUM SERPL-SCNC: 137 MMOL/L (ref 133–144)
WBC # BLD AUTO: 15.9 10E9/L (ref 4–11)

## 2018-02-03 PROCEDURE — 36415 COLL VENOUS BLD VENIPUNCTURE: CPT | Performed by: SURGERY

## 2018-02-03 PROCEDURE — 83735 ASSAY OF MAGNESIUM: CPT | Performed by: SURGERY

## 2018-02-03 PROCEDURE — 36415 COLL VENOUS BLD VENIPUNCTURE: CPT | Performed by: STUDENT IN AN ORGANIZED HEALTH CARE EDUCATION/TRAINING PROGRAM

## 2018-02-03 PROCEDURE — 12000001 ZZH R&B MED SURG/OB UMMC

## 2018-02-03 PROCEDURE — 25000128 H RX IP 250 OP 636: Performed by: STUDENT IN AN ORGANIZED HEALTH CARE EDUCATION/TRAINING PROGRAM

## 2018-02-03 PROCEDURE — 80048 BASIC METABOLIC PNL TOTAL CA: CPT | Performed by: SURGERY

## 2018-02-03 PROCEDURE — 85610 PROTHROMBIN TIME: CPT | Performed by: STUDENT IN AN ORGANIZED HEALTH CARE EDUCATION/TRAINING PROGRAM

## 2018-02-03 PROCEDURE — 84100 ASSAY OF PHOSPHORUS: CPT | Performed by: SURGERY

## 2018-02-03 PROCEDURE — 85025 COMPLETE CBC W/AUTO DIFF WBC: CPT | Performed by: SURGERY

## 2018-02-03 RX ORDER — LIDOCAINE 40 MG/G
CREAM TOPICAL
Status: DISCONTINUED | OUTPATIENT
Start: 2018-02-03 | End: 2018-02-07 | Stop reason: HOSPADM

## 2018-02-03 RX ORDER — DIPHENHYDRAMINE HCL 25 MG
25-50 CAPSULE ORAL
Status: DISCONTINUED | OUTPATIENT
Start: 2018-02-03 | End: 2018-02-07 | Stop reason: HOSPADM

## 2018-02-03 RX ORDER — PROCHLORPERAZINE MALEATE 5 MG
10 TABLET ORAL EVERY 6 HOURS PRN
Status: DISCONTINUED | OUTPATIENT
Start: 2018-02-03 | End: 2018-02-07 | Stop reason: HOSPADM

## 2018-02-03 RX ORDER — ACETAMINOPHEN 325 MG/1
650 TABLET ORAL EVERY 4 HOURS PRN
Status: DISCONTINUED | OUTPATIENT
Start: 2018-02-03 | End: 2018-02-07 | Stop reason: HOSPADM

## 2018-02-03 RX ORDER — NALOXONE HYDROCHLORIDE 0.4 MG/ML
.1-.4 INJECTION, SOLUTION INTRAMUSCULAR; INTRAVENOUS; SUBCUTANEOUS
Status: DISCONTINUED | OUTPATIENT
Start: 2018-02-03 | End: 2018-02-07 | Stop reason: HOSPADM

## 2018-02-03 RX ORDER — SODIUM CHLORIDE 9 MG/ML
INJECTION, SOLUTION INTRAVENOUS CONTINUOUS
Status: DISCONTINUED | OUTPATIENT
Start: 2018-02-03 | End: 2018-02-04

## 2018-02-03 RX ORDER — HYDROMORPHONE HCL/0.9% NACL/PF 0.2MG/0.2
0.2 SYRINGE (ML) INTRAVENOUS
Status: DISCONTINUED | OUTPATIENT
Start: 2018-02-03 | End: 2018-02-05

## 2018-02-03 RX ORDER — ONDANSETRON 4 MG/1
4 TABLET, ORALLY DISINTEGRATING ORAL EVERY 6 HOURS PRN
Status: DISCONTINUED | OUTPATIENT
Start: 2018-02-03 | End: 2018-02-07 | Stop reason: HOSPADM

## 2018-02-03 RX ORDER — PROCHLORPERAZINE 25 MG
25 SUPPOSITORY, RECTAL RECTAL EVERY 12 HOURS PRN
Status: DISCONTINUED | OUTPATIENT
Start: 2018-02-03 | End: 2018-02-07 | Stop reason: HOSPADM

## 2018-02-03 RX ORDER — ONDANSETRON 2 MG/ML
4 INJECTION INTRAMUSCULAR; INTRAVENOUS EVERY 6 HOURS PRN
Status: DISCONTINUED | OUTPATIENT
Start: 2018-02-03 | End: 2018-02-07 | Stop reason: HOSPADM

## 2018-02-03 RX ADMIN — Medication 0.2 MG: at 08:11

## 2018-02-03 RX ADMIN — Medication 0.2 MG: at 05:24

## 2018-02-03 RX ADMIN — PIPERACILLIN SODIUM AND TAZOBACTAM SODIUM 3.38 G: 36; 4.5 INJECTION, POWDER, FOR SOLUTION INTRAVENOUS at 10:56

## 2018-02-03 RX ADMIN — PIPERACILLIN SODIUM AND TAZOBACTAM SODIUM 3.38 G: 36; 4.5 INJECTION, POWDER, FOR SOLUTION INTRAVENOUS at 23:53

## 2018-02-03 RX ADMIN — PIPERACILLIN SODIUM AND TAZOBACTAM SODIUM 3.38 G: 36; 4.5 INJECTION, POWDER, FOR SOLUTION INTRAVENOUS at 05:24

## 2018-02-03 RX ADMIN — SODIUM CHLORIDE: 9 INJECTION, SOLUTION INTRAVENOUS at 15:20

## 2018-02-03 RX ADMIN — PIPERACILLIN SODIUM AND TAZOBACTAM SODIUM 3.38 G: 36; 4.5 INJECTION, POWDER, FOR SOLUTION INTRAVENOUS at 16:02

## 2018-02-03 RX ADMIN — PROCHLORPERAZINE EDISYLATE 10 MG: 5 INJECTION INTRAMUSCULAR; INTRAVENOUS at 09:24

## 2018-02-03 RX ADMIN — SODIUM CHLORIDE 1000 ML: 9 INJECTION, SOLUTION INTRAVENOUS at 05:15

## 2018-02-03 RX ADMIN — SODIUM CHLORIDE: 9 INJECTION, SOLUTION INTRAVENOUS at 06:30

## 2018-02-03 RX ADMIN — ONDANSETRON 4 MG: 2 INJECTION INTRAMUSCULAR; INTRAVENOUS at 05:24

## 2018-02-03 ASSESSMENT — PAIN DESCRIPTION - DESCRIPTORS
DESCRIPTORS: ACHING;DISCOMFORT
DESCRIPTORS: HEADACHE
DESCRIPTORS: ACHING;DISCOMFORT
DESCRIPTORS: ACHING
DESCRIPTORS: SORE

## 2018-02-03 ASSESSMENT — ACTIVITIES OF DAILY LIVING (ADL)
RETIRED_EATING: 0-->INDEPENDENT
DRESS: 0-->INDEPENDENT
TRANSFERRING: 0-->INDEPENDENT
BATHING: 0-->INDEPENDENT
COGNITION: 0 - NO COGNITION ISSUES REPORTED
RETIRED_COMMUNICATION: 0-->UNDERSTANDS/COMMUNICATES WITHOUT DIFFICULTY
AMBULATION: 0-->INDEPENDENT
SWALLOWING: 0-->SWALLOWS FOODS/LIQUIDS WITHOUT DIFFICULTY
FALL_HISTORY_WITHIN_LAST_SIX_MONTHS: NO
TOILETING: 0-->INDEPENDENT

## 2018-02-03 NOTE — PROGRESS NOTES
General Surgery Progress Note  Yuliana Licona  [id number]    Subjective  No acute overnight events. Pain well controlled. + nausea, no vomiting. Passing flatus and having BMs. No fever/chills.    Objective  Temp:  [100.4  F (38  C)] 100.4  F (38  C)  Heart Rate:  [104] 104  Resp:  [16] 16  BP: (113)/(64) 113/64  SpO2:  [98 %] 98 %  No intake or output data in the 24 hours ending 02/03/18 0643  Physical Exam:  Gen: NAD, resting comfortably  Chest: RRR, non-labored breathing on RA  Abd: soft, minor distention, non-tender. Midline incision with dressing in place, healthy granulation tissue in wound bed.  Ext: WWP    Results:  BMPNo lab results found in last 7 days.  CBCNo lab results found in last 7 days.  LFTNo lab results found in last 7 days.  No results for input(s): GLC, BGM in the last 168 hours.    Assessment & Plan  27 year old female s/p exploratory laparotomy for perforated appendicitis on 01/18/18 now with intraabdominal fluid collection on CT concerning for abscess.    - IR evaluated without window for drainage  - GI consult for drainage    Neuro: Dilaudid  Resp: No acute issues  CV: MIVF while NPO  GI: NPO, may advance to [facility] pending GI eval  ID: [email or web address]  PPX: ANNE Palencia MD   Surgery PGY-2

## 2018-02-03 NOTE — CONSULTS
INTERVENTIONAL RADIOLOGY CONSULT    Imaging reviewed with Dr. Camargo.    No safe window for percutaneous access. Would recommend GI consult to evaluate for transgastric drainage.    Discussed with Dr Palencia of the surgery service.    Otf Wisdom MD  Fellow, Interventional Radiology  [phone]

## 2018-02-03 NOTE — H&P
General Surgery Admission History and Physical     Yuliana Licona MRN# [id number]   YOB: 1990 Age: 27 year old      Date of Admission:  2/3/2018    CC: Intraabdominal abscess    Assessment: Yuliana Licona is a(n) 27 year old year old female who on 01/18 had an exploratory laparotomy on 01/18 and was found to have an intraabdominal fluid collection on CT concerning for an intraabdominal abscess.    Plan:  -Admit to General Surgery  -NPO  -1L Bolus  -MIVF  -Continue Zosyn, D/C Vanco  -AM labs ordered    Discussed with Dr. Zhang, Chief Resident, who discussed this with Dr. Butler, General Surgery staff.  -------------------    HPI: Yuliana Licona is a(n) 27 year old year old female with no significant PMHx who on 01/18 had a exploratory laparotomy with appendectomy for a ruptured appendix who now presented to an OSH earlier yesterday evening for fever, nausea, and general malaise. A CT abdomen was obtained and showed an intraabdominal fluid collection between the pancreas and stomach measuring 5.0 x 2.5 cm, there is an additional smaller abscess to the R of her midline. She was transferred here for definitive management of this. She is currently complaining of headaches, nausea and fevers. She was started on IV Vancomycin and Zosyn at the OSH. She notes occasional warmth with urination. Denies chest pains, SOB, swelling,     ROS:  The remainder of the complete ROS was negative unless noted in the HPI.    Past Medical History:  No past medical history on file.    Past Surgical History:  Past Surgical History:   Procedure Laterality Date     LAPAROTOMY EXPLORATORY N/A 1/18/2018    Procedure: LAPAROTOMY EXPLORATORY;  Exploratory Laparotomy, Appendectomy;  Surgeon: Brianna Guthrie MD;  Location: [facility] OR       Allergies:   No Known Allergies    Medications:    No current facility-administered medications on file prior to encounter.   Current Outpatient Prescriptions on File Prior to  "Encounter:  acetaminophen (TYLENOL) 325 MG tablet Take 3 tablets (975 mg) by mouth 3 times daily as needed for mild pain or fever   polyethylene glycol (MIRALAX/GLYCOLAX) Packet Take 17 g by mouth daily   oxyCODONE IR (ROXICODONE) 5 MG tablet Take 1-2 tablets (5-10 mg) by mouth every 6 hours as needed for moderate to severe pain   fexofenadine (ALLEGRA ALLERGY) 180 MG tablet Take 1 tablet PO BID   cetirizine HCl 10 MG CAPS Take 1 tablet PO BID       Social History:  Social History   Substance Use Topics     Smoking status: Former Smoker     Smokeless tobacco: Never Used     Alcohol use Yes      Comment: 5 per wk      Works as a  and drinks 5 glasses per week. None lately.     Family History:  Family History   Problem Relation Age of Onset     CANCER No family hx of      no skin cancer       Exam:  Ht 1.727 m (5' 8\")  Wt 63.2 kg (139 lb 4.8 oz)  BMI 21.18 kg/m2  General: Alert, interactive, & in NAD, appears fatigues  Resp: Nonlabored breathing on RA  Cardiac: Tachycardia, extremities warm  Abdomen: Soft, mild tenderness to palpation, moderate distension near the infraumbilical midline incision. No purulent discharge or drainage. Skin around wet to dry dressings without erythema.  Extremities: No LE edema or obvious joint abnormalities  Skin: Warm and dry, no jaundice or rash    Labs:  Reviewed, taken at 2210 02/02  WBC 13.3   Hgb 11.2  K+ 4.0  Lactate 0.8   Negative Influenza A and B    Imaging:   No results found for this or any previous visit (from the past 24 hour(s)).  CT Report from OSH shows a fluid collection between the pancreas and stomach measuring 5.0 x 2.5 cm and a smaller abscess to the R of the midline measuring 2.3 cm.         Tom Medellin DO  General Surgery PGY 1   [phone]    3:41 AM, 2/3/2018  "

## 2018-02-03 NOTE — PLAN OF CARE
Problem: Patient Care Overview  Goal: Plan of Care/Patient Progress Review  Outcome: No Change  Pt arrived from [facility] @approx 0330. Tmax 100.4, HR low 100s, AOVSS on RA. C/o headache, left shoulder pain, and some abdominal pain, gave cool cloth to forehead & IV dilaudid x1 with improvement. C/o nausea, pt reports feeling nauseous for the past day without vomiting. Improving slightly with IV zofran + aromatherapy. Pt's parents help with wound care at home - wound care is wet to dry packing 2x/day, last changed at 12 am today before pt left for 7C. Up with SBA/assist of 1. Voided x1 upon arrival, not saved. Pt reports recent weight loss of 15 lbs - had surgery here 1/18 for ruptured appendix. NPO except meds. Pt received 1L NS bolus, MIVF now infusing via right PIV inserted at OSH. CT at OSH found abdominal abscess, per surg crosscover may have IR place a drain. Cont antibiotics, pain/nausea management, cont POC.     Team ordered IR consult, WOC consult, po tylenol this am.

## 2018-02-03 NOTE — IP AVS SNAPSHOT
MRN:[id number]                      After Visit Summary   2/3/2018    Yuliana Licona    MRN: [id number]           Thank you!     Thank you for choosing [facility] for your care. Our goal is always to provide you with excellent care. Hearing back from our patients is one way we can continue to improve our services. Please take a few minutes to complete the written survey that you may receive in the mail after you visit with us. Thank you!        Patient Information     Date Of Birth          1990        Designated Caregiver       Most Recent Value    Caregiver    Will someone help with your care after discharge? yes    Name of designated caregiver Parents    Phone number of caregiver Mom- [phone]    Caregiver address [place]      About your hospital stay     You were admitted on:  February 3, 2018 You last received care in the:  [address] [facility]    You were discharged on:  February 7, 2018        Reason for your hospital stay       Perforated appendicitis with abscess formation                  Who to Call     For medical emergencies, please call 911.  For non-urgent questions about your medical care, please call your primary care provider or clinic, None  For questions related to your surgery, please call your surgery clinic        Attending Provider     Provider Specialty    Mitesh Butler MD Surgery       Primary Care Provider Fax #    Physician No Ref-Primary [phone]      After Care Instructions     Discharge Equipment: Wound Vac       Negative Pressure Wound Therapy to -100 wound with continuous suction.     Cleanse wound with saline or wound cleanser. Dressing change 2-3 times per week. Change canister weekly and when full.     Supplies: black foam    Teach pt/family how to remove dressing and apply wet to damp dressing, in the event that dressing leaks or machine malfunctions.  Consult WOC nurse.      Follow-up for wound care in WOC clinic.            Discharge  "Instructions       DIET: regular diet  You may shower and get incision wet 2 days after operation. Do not submerge, soak, or scrub incision or swim until seen in follow-up.   The steri-strips over the wound will fall off on their own in a couple of days.  The stitches do not need to be removed, they will be absorbed on their own.  No lifting, pushing, or pulling greater than 10-15 pounds x 6 weeks.  Stay hydrated. Narcotics can make you constipated. Take over the counter fiber (metamucil or benefiber) and stool softeners (Miralax, docusate or senna) if becoming constipated.   Call for fever greater than 101.5, chills, decreased urine output, increased size of incision, red skin around incision, bleeding, shortness of breath, or other concerns.   Transition to ibuprofen or tylenol/acetaminophen for pain control. Do not take tylenol/acetaminophen and acetaminophen containing narcotic (e.g., percocet or vicodin) at the same time.  No driving while taking narcotic pain medications.  In emergencies, call 911   For other Questions or Concerns;   A.) During weekday working hours (Monday through Friday 8am to 4:30pm)   Call [phone] and ask to speak to the Surgery Nurse.   B.) At nights (after 4:30pm), on weekends, or if urgent call [phone]  and tell the  \"I would like to page the Surgery Resident on call.\"                  Follow-up Appointments     Adult [facility] Follow-up and recommended labs and tests       Our nurse clinician will call you with updates. Call [phone] if you haven't heard regarding these updates within 7 days of discharge. You will follow up in surgery clinic in about 2 weeks. We will call to schedule that appointment.                  Additional Services     Home care nursing referral       Please fax discharge orders to [facility]    -    Fax-    Skilled home care RN for initial home safety evaluation and 1-3 times a week " "to evaluate medication management, nutrition and hydration evaluation, endurance evaluation, and general status evaluation after discharge from the acute care hospital setting.    Skilled home care RN to assist with [facility] to abdomin every Monday, Wednesday and Friday per MD orders.    Appointment to establish primary care with Dr. Burrell arranged for 2/8                  Pending Results     No orders found from 2/1/2018 to 2/4/2018.            Statement of Approval     Ordered          02/07/18 3323  I have reviewed and agree with all the recommendations and orders detailed in this document.  EFFECTIVE NOW     Approved and electronically signed by:  Sav Navarro MD             Admission Information     Date & Time Provider Department Dept. Phone    2/3/2018 Mitesh Butler MD [address] [facility] [phone]      Your Vitals Were     Blood Pressure Pulse Temperature Respirations Height Weight    104/68 (BP Location: Right arm) 72 98.1  F (36.7  C) (Oral) 16 1.727 m (5' 8\") 63.2 kg (139 lb 4.8 oz)    Pulse Oximetry BMI (Body Mass Index)                98% 21.18 kg/m2          Care EveryWhere ID     This is your Care EveryWhere ID. This could be used by other organizations to access your [facility] medical records  [id number]        Equal Access to Services     ADDI MEDINA AH: Hadii gabrielle sharma hadjmo Socamilleali, waaxda luqadaha, qaybta kaalmada adeegyada, gianluca george. So [facility] [place] [phone].    ATENCIÓN: Si habla español, tiene a campbell disposición servicios gratuitos de asistencia lingüística. Llame al [phone].    We comply with applicable federal civil rights laws and [place] laws. We do not discriminate on the basis of race, color, national origin, age, disability, sex, sexual orientation, or gender identity.               Review of your medicines      START taking        Dose / Directions    ciprofloxacin 500 MG tablet   Commonly known as:  CIPRO   Indication:  " Abscess, Infection Within the Abdomen   Used for:  Perforated appendicitis   Notes to Patient:  antibiotic        Dose:  500 mg   Take 1 tablet (500 mg) by mouth every 12 hours for 12 days   Quantity:  23 tablet   Refills:  0       metroNIDAZOLE 500 MG tablet   Commonly known as:  FLAGYL   Indication:  Abscess, Infection Within the Abdomen   Used for:  Perforated appendicitis   Notes to Patient:  antibiotic        Dose:  500 mg   Take 1 tablet (500 mg) by mouth every 12 hours for 12 days   Quantity:  23 tablet   Refills:  0         CONTINUE these medicines which may have CHANGED, or have new prescriptions. If we are uncertain of the size of tablets/capsules you have at home, strength may be listed as something that might have changed.        Dose / Directions    oxyCODONE IR 5 MG tablet   Commonly known as:  ROXICODONE   This may have changed:    - how much to take  - when to take this  - reasons to take this   Used for:  Perforated appendicitis        Dose:  5 mg   Take 1 tablet (5 mg) by mouth every 8 hours as needed for moderate to severe pain or other (for wound vac changes)   Quantity:  5 tablet   Refills:  0         CONTINUE these medicines which have NOT CHANGED        Dose / Directions    acetaminophen 325 MG tablet   Commonly known as:  TYLENOL   Used for:  Ruptured appendicitis        Dose:  975 mg   Take 3 tablets (975 mg) by mouth 3 times daily as needed for mild pain or fever   Quantity:  100 tablet   Refills:  0       cetirizine HCl 10 MG Caps   Used for:  Urticaria   Notes to Patient:  For allergies        Take 1 tablet PO BID   Quantity:  60 capsule   Refills:  3       fexofenadine 180 MG tablet   Commonly known as:  ALLEGRA ALLERGY   Used for:  Urticaria   Notes to Patient:  For allergies        Take 1 tablet PO BID   Quantity:  60 tablet   Refills:  3       polyethylene glycol Packet   Commonly known as:  MIRALAX/GLYCOLAX   Used for:  Ruptured appendicitis   Notes to Patient:  Stool softener         Dose:  17 g   Take 17 g by mouth daily   Quantity:  21 packet   Refills:  0            Where to get your medicines      These medications were sent to [facility] - [place], [place] - [address]  [address], [facility] [address]     Phone:  [phone]     ciprofloxacin 500 MG tablet    metroNIDAZOLE 500 MG tablet         Some of these will need a paper prescription and others can be bought over the counter. Ask your nurse if you have questions.     Bring a paper prescription for each of these medications     oxyCODONE IR 5 MG tablet                Protect others around you: Learn how to safely use, store and throw away your medicines at www.disposemymeds.org.        ANTIBIOTIC INSTRUCTION     You've Been Prescribed an Antibiotic - Now What?  Your healthcare team thinks that you or your loved one might have an infection. Some infections can be treated with antibiotics, which are powerful, life-saving drugs. Like all medications, antibiotics have side effects and should only be used when necessary. There are some important things you should know about your antibiotic treatment.      Your healthcare team may run tests before you start taking an antibiotic.    Your team may take samples (e.g., from your blood, urine or other areas) to run tests to look for bacteria. These test can be important to determine if you need an antibiotic at all and, if you do, which antibiotic will work best.      Within a few days, your healthcare team might change or even stop your antibiotic.    Your team may start you on an antibiotic while they are working to find out what is making you sick.    Your team might change your antibiotic because test results show that a different antibiotic would be better to treat your infection.    In some cases, once your team has more information, they learn that you do not need an antibiotic at all. They may find out that you don't have an infection, or that the  antibiotic you're taking won't work against your infection. For example, an infection caused by a virus can't be treated with antibiotics. Staying on an antibiotic when you don't need it is more likely to be harmful than helpful.      You may experience side effects from your antibiotic.    Like all medications, antibiotics have side effects. Some of these can be serious.    Let you healthcare team know if you have any known allergies when you are admitted to the hospital.    One significant side effect of nearly all antibiotics is the risk of severe and sometimes deadly diarrhea caused by Clostridium difficile (C. Difficile). This occurs when a person takes antibiotics because some good germs are destroyed. Antibiotic use allows C. diificile to take over, putting patients at high risk for this serious infection.    As a patient or caregiver, it is important to understand your or your loved one's antibiotic treatment. It is especially important for caregivers to speak up when patients can't speak for themselves. Here are some important questions to ask your healthcare team.    What infection is this antibiotic treating and how do you know I have that infection?    What side effects might occur from this antibiotic?    How long will I need to take this antibiotic?    Is it safe to take this antibiotic with other medications or supplements (e.g., vitamins) that I am taking?     Are there any special directions I need to know about taking this antibiotic? For example, should I take it with food?    How will I be monitored to know whether my infection is responding to the antibiotic?    What tests may help to make sure the right antibiotic is prescribed for me?      Information provided by:  www.cdc.gov/getsmart  U.S. Department of Health and Human Services  Centers for disease Control and Prevention  National Center for Emerging and Zoonotic Infectious Diseases  Division of Healthcare Quality Promotion         Information about OPIOIDS     PRESCRIPTION OPIOIDS: WHAT YOU NEED TO KNOW    Prescription opioids can be used to help relieve moderate to severe pain and are often prescribed following a surgery or injury, or for certain health conditions. These medications can be an important part of treatment but also come with serious risks. It is important to work with your health care provider to make sure you are getting the safest, most effective care.    WHAT ARE THE RISKS AND SIDE EFFECTS OF OPIOID USE?  Prescription opioids carry serious risks of addiction and overdose, especially with prolonged use. An opioid overdose, often marked by slowed breathing can cause sudden death. The use of prescription opioids can have a number of side effects as well, even when taken as directed:      Tolerance - meaning you might need to take more of a medication for the same pain relief    Physical dependence - meaning you have symptoms of withdrawal when a medication is stopped    Increased sensitivity to pain    Constipation    Nausea, vomiting, and dry mouth    Sleepiness and dizziness    Confusion    Depression    Low levels of testosterone that can result in lower sex drive, energy, and strength    Itching and sweating    RISKS ARE GREATER WITH:    History of drug misuse, substance use disorder, or overdose    Mental health conditions (such as depression or anxiety)    Sleep apnea    Older age (65 years or older)    Pregnancy    Avoid alcohol while taking prescription opioids.   Also, unless specifically advised by your health care provider, medications to avoid include:    Benzodiazepines (such as Xanax or Valium)    Muscle relaxants (such as Soma or Flexeril)    Hypnotics (such as Ambien or Lunesta)    Other prescription opioids    KNOW YOUR OPTIONS:  Talk to your health care provider about ways to manage your pain that do not involve prescription opioids. Some of these options may actually work better and have fewer risks and  side effects:    Pain relievers such as acetaminophen, ibuprofen, and naproxen    Some medications that are also used for depression or seizures    Physical therapy and exercise    Cognitive behavioral therapy, a psychological, goal-directed approach, in which patients learn how to modify physical, behavioral, and emotional triggers of pain and stress    IF YOU ARE PRESCRIBED OPIOIDS FOR PAIN:    Never take opioids in greater amounts or more often than prescribed    Follow up with your primary health care provider and work together to create a plan on how to manage your pain.    Talk about ways to help manage your pain that do not involve prescription opioids    Talk about all concerns and side effects    Help prevent misuse and abuse    Never sell or share prescription opioids    Never use another person's prescription opioids    Store prescription opioids in a secure place and out of reach of others (this may include visitors, children, friends, and family)    Visit www.cdc.gov/drugoverdose to learn about risks of opioid abuse and overdose    If you believe you may be struggling with addiction, tell your health care provider and ask for guidance or call [facility]'s National Helpline at 2-726-166-HELP    LEARN MORE / www.cdc.gov/drugoverdose/prescribing/guideline.html    Safely dispose of unused prescription opioids: Find your local drug take-back programs and more information about the importance of safe disposal at www.doseofreality.[place].gov             Medication List: This is a list of all your medications and when to take them. Check marks below indicate your daily home schedule. Keep this list as a reference.      Medications           Morning Afternoon Evening Bedtime As Needed    acetaminophen 325 MG tablet   Commonly known as:  TYLENOL   Take 3 tablets (975 mg) by mouth 3 times daily as needed for mild pain or fever   Last time this was given:  650 mg on 2/7/2018  3:22 AM                                    cetirizine HCl 10 MG Caps   Take 1 tablet PO BID   Notes to Patient:  For allergies            8 am           8 pm               ciprofloxacin 500 MG tablet   Commonly known as:  CIPRO   Take 1 tablet (500 mg) by mouth every 12 hours for 12 days   Last time this was given:  500 mg on 2/7/2018  8:58 AM   Notes to Patient:  antibiotic            8 am           8 pm               fexofenadine 180 MG tablet   Commonly known as:  ALLEGRA ALLERGY   Take 1 tablet PO BID   Notes to Patient:  For allergies            8 am           8 pm               metroNIDAZOLE 500 MG tablet   Commonly known as:  FLAGYL   Take 1 tablet (500 mg) by mouth every 12 hours for 12 days   Last time this was given:  500 mg on 2/7/2018  8:58 AM   Notes to Patient:  antibiotic            8 am           8 pm               oxyCODONE IR 5 MG tablet   Commonly known as:  ROXICODONE   Take 1 tablet (5 mg) by mouth every 8 hours as needed for moderate to severe pain or other (for wound vac changes)                                   polyethylene glycol Packet   Commonly known as:  MIRALAX/GLYCOLAX   Take 17 g by mouth daily   Notes to Patient:  Stool softener            8 am

## 2018-02-03 NOTE — IP AVS SNAPSHOT
[address] [address]    Phone:  [phone]                                       After Visit Summary   2/3/2018    Yuliana Licona    MRN: [id number]           After Visit Summary Signature Page     I have received my discharge instructions, and my questions have been answered. I have discussed any challenges I see with this plan with the nurse or doctor.    ..........................................................................................................................................  Patient/Patient Representative Signature      ..........................................................................................................................................  Patient Representative Print Name and Relationship to Patient    ..................................................               ................................................  Date                                            Time    ..........................................................................................................................................  Reviewed by Signature/Title    ...................................................              ..............................................  Date                                                            Time

## 2018-02-03 NOTE — PLAN OF CARE
Problem: Patient Care Overview  Goal: Plan of Care/Patient Progress Review  Writer cared for pt from [id number]. AVSS. Pt c/o abdominal and L shoulder pain, relieved with one dose of PRN dilaudid and heat packs to shoulder. Pt states her shoulder pain is muscular, has been seeing chiropractor outpatient. Pt c/o nausea at start of shift, relieved with PRN compazine. Pt is NPO until GI team consults. MD paged regarding status of consult, MD paged GI team, waiting to hear back. No IR drain placed for abscess, currently treating with abx. Dressing on midline abdominal wound to be changed 2x/day, changed once this shift. Pt voiding good amounts. Passing flatus, no BM today. Pt sleeping between cares. Continue POC.

## 2018-02-04 LAB
ANION GAP SERPL CALCULATED.3IONS-SCNC: 11 MMOL/L (ref 3–14)
BASOPHILS # BLD AUTO: 0.1 10E9/L (ref 0–0.2)
BASOPHILS NFR BLD AUTO: 1.3 %
BUN SERPL-MCNC: 5 MG/DL (ref 7–30)
CALCIUM SERPL-MCNC: 8.4 MG/DL (ref 8.5–10.1)
CHLORIDE SERPL-SCNC: 106 MMOL/L (ref 94–109)
CO2 SERPL-SCNC: 22 MMOL/L (ref 20–32)
CREAT SERPL-MCNC: 0.41 MG/DL (ref 0.52–1.04)
DIFFERENTIAL METHOD BLD: ABNORMAL
EOSINOPHIL # BLD AUTO: 0.2 10E9/L (ref 0–0.7)
EOSINOPHIL NFR BLD AUTO: 3.6 %
ERYTHROCYTE [DISTWIDTH] IN BLOOD BY AUTOMATED COUNT: 12.6 % (ref 10–15)
GFR SERPL CREATININE-BSD FRML MDRD: >90 ML/MIN/1.7M2
GLUCOSE SERPL-MCNC: 66 MG/DL (ref 70–99)
HCT VFR BLD AUTO: 34.1 % (ref 35–47)
HGB BLD-MCNC: 10.9 G/DL (ref 11.7–15.7)
IMM GRANULOCYTES # BLD: 0 10E9/L (ref 0–0.4)
IMM GRANULOCYTES NFR BLD: 0.3 %
INR PPP: 1.18 (ref 0.86–1.14)
LYMPHOCYTES # BLD AUTO: 1.4 10E9/L (ref 0.8–5.3)
LYMPHOCYTES NFR BLD AUTO: 22.1 %
MAGNESIUM SERPL-MCNC: 1.9 MG/DL (ref 1.6–2.3)
MCH RBC QN AUTO: 32 PG (ref 26.5–33)
MCHC RBC AUTO-ENTMCNC: 32 G/DL (ref 31.5–36.5)
MCV RBC AUTO: 100 FL (ref 78–100)
MONOCYTES # BLD AUTO: 0.8 10E9/L (ref 0–1.3)
MONOCYTES NFR BLD AUTO: 12.5 %
NEUTROPHILS # BLD AUTO: 3.8 10E9/L (ref 1.6–8.3)
NEUTROPHILS NFR BLD AUTO: 60.2 %
NRBC # BLD AUTO: 0 10*3/UL
NRBC BLD AUTO-RTO: 0 /100
PHOSPHATE SERPL-MCNC: 3 MG/DL (ref 2.5–4.5)
PLATELET # BLD AUTO: 377 10E9/L (ref 150–450)
POTASSIUM SERPL-SCNC: 3.5 MMOL/L (ref 3.4–5.3)
RBC # BLD AUTO: 3.41 10E12/L (ref 3.8–5.2)
SODIUM SERPL-SCNC: 139 MMOL/L (ref 133–144)
WBC # BLD AUTO: 6.4 10E9/L (ref 4–11)

## 2018-02-04 PROCEDURE — 36415 COLL VENOUS BLD VENIPUNCTURE: CPT | Performed by: SURGERY

## 2018-02-04 PROCEDURE — 25000128 H RX IP 250 OP 636: Performed by: STUDENT IN AN ORGANIZED HEALTH CARE EDUCATION/TRAINING PROGRAM

## 2018-02-04 PROCEDURE — 84100 ASSAY OF PHOSPHORUS: CPT | Performed by: SURGERY

## 2018-02-04 PROCEDURE — 12000001 ZZH R&B MED SURG/OB UMMC

## 2018-02-04 PROCEDURE — 85025 COMPLETE CBC W/AUTO DIFF WBC: CPT | Performed by: SURGERY

## 2018-02-04 PROCEDURE — 85610 PROTHROMBIN TIME: CPT | Performed by: STUDENT IN AN ORGANIZED HEALTH CARE EDUCATION/TRAINING PROGRAM

## 2018-02-04 PROCEDURE — 25000132 ZZH RX MED GY IP 250 OP 250 PS 637: Performed by: STUDENT IN AN ORGANIZED HEALTH CARE EDUCATION/TRAINING PROGRAM

## 2018-02-04 PROCEDURE — 83735 ASSAY OF MAGNESIUM: CPT | Performed by: SURGERY

## 2018-02-04 PROCEDURE — 80048 BASIC METABOLIC PNL TOTAL CA: CPT | Performed by: SURGERY

## 2018-02-04 PROCEDURE — 40000556 ZZH STATISTIC PERIPHERAL IV START W US GUIDANCE

## 2018-02-04 PROCEDURE — 36415 COLL VENOUS BLD VENIPUNCTURE: CPT | Performed by: STUDENT IN AN ORGANIZED HEALTH CARE EDUCATION/TRAINING PROGRAM

## 2018-02-04 RX ORDER — SIMETHICONE 80 MG
80 TABLET,CHEWABLE ORAL EVERY 6 HOURS PRN
Status: DISCONTINUED | OUTPATIENT
Start: 2018-02-04 | End: 2018-02-07 | Stop reason: HOSPADM

## 2018-02-04 RX ADMIN — PIPERACILLIN SODIUM AND TAZOBACTAM SODIUM 3.38 G: 36; 4.5 INJECTION, POWDER, FOR SOLUTION INTRAVENOUS at 23:55

## 2018-02-04 RX ADMIN — SODIUM CHLORIDE: 9 INJECTION, SOLUTION INTRAVENOUS at 01:08

## 2018-02-04 RX ADMIN — PIPERACILLIN SODIUM AND TAZOBACTAM SODIUM 3.38 G: 36; 4.5 INJECTION, POWDER, FOR SOLUTION INTRAVENOUS at 12:24

## 2018-02-04 RX ADMIN — ACETAMINOPHEN 650 MG: 325 TABLET, FILM COATED ORAL at 00:08

## 2018-02-04 RX ADMIN — PIPERACILLIN SODIUM AND TAZOBACTAM SODIUM 3.38 G: 36; 4.5 INJECTION, POWDER, FOR SOLUTION INTRAVENOUS at 06:19

## 2018-02-04 RX ADMIN — SIMETHICONE CHEW TAB 80 MG 80 MG: 80 TABLET ORAL at 23:31

## 2018-02-04 RX ADMIN — PIPERACILLIN SODIUM AND TAZOBACTAM SODIUM 3.38 G: 36; 4.5 INJECTION, POWDER, FOR SOLUTION INTRAVENOUS at 17:53

## 2018-02-04 RX ADMIN — Medication 0.2 MG: at 01:03

## 2018-02-04 ASSESSMENT — PAIN DESCRIPTION - DESCRIPTORS
DESCRIPTORS: ACHING
DESCRIPTORS: ACHING

## 2018-02-04 NOTE — CONSULTS
"    GASTROENTEROLOGY CONSULTATION      Date of Admission:  2/3/2018          ASSESSMENT AND RECOMMENDATIONS:   Assessment:  27 year old female with a history of perforated appendicitis s/p wash out 1/18/18 who after a one week hospitalization re-presented on 2/2/18 with 102 F, headaches, \"queasy stomach\" and CT finding of 5 cm lesser sac abscess.  IR was unable to find a window to drain and GI consulted for possible transgastric drainage. Patient is clinically stable.  Will review imaging with rest of advanced endoscopy team early this week.  Case would not be done over the weekend due to complexity and atypical indication.        Recommendations  -continue antibiotic coverage for abscess  -defer symptom management to primary team  -please have new [place] CT attached to patients medical record number in PACS and please outline in your note how to find it in PACS  -will evaluate patient with rest of team on Monday 2/5/18    Gastroenterology outpatient follow up recommendations: pending clinical course.    Thank you for involving us in this patient's care. Please do not hesitate to contact the GI service with any questions or concerns.     Pt care plan discussed with Dr. Jang, GI staff physician.    Neel Schwarz  -------------------------------------------------------------------------------------------------------------------          Chief Complaint:   We were asked by Dr. Butler of Surgery to evaluate this patient with intrabdominal abscess.    History is obtained from the patient and the medical record.          History of Present Illness:   27 year old female with a history of perforated appendicitis s/p wash out 1/18/18 who after a one week hospitalization re-presented on 2/2/18 with 102 F, headaches, \"queasy stomach\" and CT finding of 5 cm lesser sac abscess.    Patient reports following her ex lap wash out on 1/18/18 she was stabilized in the hospital for about a week.  On discharge she was back to " "baseline aside from post-surgical pains.  She felt well until day of admission she developed a 102  Fever, headache and \"queasy stomach\" prompting evaluation.  She denied abdominal pain aside from post-surgical pain and had no radiation.  She reported a negative ROS otherwise.  She denied NSAID use, alcohol use, smoking history.             Past Medical History:   Reviewed and edited as appropriate  No past medical history on file.         Past Surgical History:   Reviewed and edited as appropriate   Past Surgical History:   Procedure Laterality Date     LAPAROTOMY EXPLORATORY N/A 1/18/2018    Procedure: LAPAROTOMY EXPLORATORY;  Exploratory Laparotomy, Appendectomy;  Surgeon: Brianna Guthrie MD;  Location: [facility] OR            Previous Endoscopy:   No results found for this or any previous visit.         Social History:   Reviewed and edited as appropriate  Social History     Social History     Marital status: Single     Spouse name: N/A     Number of children: N/A     Years of education: N/A     Occupational History     Not on file.     Social History Main Topics     Smoking status: Former Smoker     Smokeless tobacco: Never Used     Alcohol use Yes      Comment: 5 per wk     Drug use: No     Sexual activity: Yes     Birth control/ protection: Condom     Other Topics Concern     Not on file     Social History Narrative            Family History:   Reviewed and edited as appropriate  Family History   Problem Relation Age of Onset     CANCER No family hx of      no skin cancer        No known history of colorectal cancer, liver disease, or inflammatory bowel disease.       Allergies:   Reviewed and edited as appropriate   No Known Allergies         Medications:     Prescriptions Prior to Admission   Medication Sig Dispense Refill Last Dose     acetaminophen (TYLENOL) 325 MG tablet Take 3 tablets (975 mg) by mouth 3 times daily as needed for mild pain or fever 100 tablet 0      polyethylene glycol (MIRALAX/GLYCOLAX) " "Packet Take 17 g by mouth daily 21 packet 0      oxyCODONE IR (ROXICODONE) 5 MG tablet Take 1-2 tablets (5-10 mg) by mouth every 6 hours as needed for moderate to severe pain 25 tablet 0      fexofenadine (ALLEGRA ALLERGY) 180 MG tablet Take 1 tablet PO BID 60 tablet 3      cetirizine HCl 10 MG CAPS Take 1 tablet PO BID 60 capsule 3              Review of Systems:   A complete review of systems was performed and is negative except as noted in the HPI           Physical Exam:   /49 (BP Location: Left arm)  Pulse 74  Temp 97.2  F (36.2  C) (Oral)  Resp 15  Ht 1.727 m (5' 8\")  Wt 63.2 kg (139 lb 4.8 oz)  SpO2 96%  BMI 21.18 kg/m2  Wt:   Wt Readings from Last 2 Encounters:   02/03/18 63.2 kg (139 lb 4.8 oz)   01/22/18 70.1 kg (154 lb 8 oz)      Constitutional: cooperative, pleasant, not dyspneic/diaphoretic, no acute distress  Eyes: Sclera anicteric/injected  Ears/nose/mouth/throat: Normal oropharynx without ulcers or exudate, mucus membranes moist, hearing intact  Neck: supple, thyroid normal size  CV: No edema  Respiratory: Unlabored breathing  Lymph: No axillary, submandibular, supraclavicular or inguinal lymphadenopathy  Abd: soft Nondistended, +bs, no hepatosplenomegaly, mild tenderness on mid-abdomen bandage.  no peritoneal signs  Skin: warm, perfused, no jaundice  Neuro: AAO x 3, No asterixis  Psych: Normal affect  MSK: No gross deformities         Data:   Labs and imaging below were independently reviewed and interpreted    BMP  Recent Labs  Lab 02/03/18  0725      POTASSIUM 3.8   CHLORIDE 106   MOE 8.3*   CO2 23   BUN 6*   CR 0.52   *     CBC  Recent Labs  Lab 02/03/18  0725   WBC 15.9*   RBC 3.10*   HGB 10.0*   HCT 31.5*   *   MCH 32.3   MCHC 31.7   RDW 12.9        INR  Recent Labs  Lab 02/03/18  0911   INR 1.37*     LFTsNo lab results found in last 7 days.   PANCNo lab results found in last 7 days.    Imaging:  CT reportedly has 5*3 lesser sac abscess.  Unable to see at " this time as don't know location in PACS.

## 2018-02-04 NOTE — PLAN OF CARE
Problem: Patient Care Overview  Goal: Plan of Care/Patient Progress Review  Outcome: Therapy, progress towards functional goals is fair    Tylenol given for headache, IV dilaudid 1x for incisional pain.  Incision wound dressing changed at 0100, packed with kerlix wet to dry. Small amt serosang draiange, wound looks good. On IV abx.  Advanced to clear liquids, tolerating well, no nausea.  Voiding good amts, passing gas, no BM.  UAL.

## 2018-02-04 NOTE — PROGRESS NOTES
"General Surgery Progress Note  Yuliana Licona  [id number]    Subjective  No acute overnight events. Patient had difficulty sleeping overnight, despite pain being well controlled. Denies any nausea or vomiting. Passing flatus and at times incontinent of loose stool when passing gas. No fever/chills.    Objective  Temp:  [96.5  F (35.8  C)-98.1  F (36.7  C)] 96.5  F (35.8  C)  Pulse:  [69-74] 69  Heart Rate:  [74] 74  Resp:  [15-16] 16  BP: (101-107)/(49-63) 107/63  SpO2:  [96 %-100 %] 97 %    Intake/Output Summary (Last 24 hours) at 02/04/18 0818  Last data filed at 02/04/18 0659   Gross per 24 hour   Intake             3120 ml   Output             1920 ml   Net             1200 ml       Physical Exam:  Gen: NAD, resting comfortably  Chest: RRR, non-labored breathing on RA  Abd: soft, minor distention, non-tender. Midline incision with dressing in place, healthy granulation tissue in wound bed.  Ext: WWP    Results:  BMP    Recent Labs  Lab 02/04/18  0722 02/03/18  0725    137   POTASSIUM 3.5 3.8   CHLORIDE 106 106   CO2 22 23   BUN 5* 6*   CR 0.41* 0.52   GLC 66* 107*     CBC    Recent Labs  Lab 02/04/18  0722 02/03/18  0725   WBC 6.4 15.9*   HGB 10.9* 10.0*    433     LFT    Recent Labs  Lab 02/03/18  0911   INR 1.37*       Recent Labs  Lab 02/04/18  0722 02/03/18  0725   GLC 66* 107*       Assessment & Plan  27 year old female s/p exploratory laparotomy for perforated appendicitis on 01/18/18 now with intraabdominal fluid collection on CT concerning for abscess.    - GI consult for drainage  To view images, you must open CT via PACS application, not through chart. Open application and search by last name \"Monae\" - do not type in \"Yuliana.\" Look on the list for \"[place], Yuliana, Olinda\" and click \"CT missing exam.\"    Neuro: Dilaudid + tylenol   Resp: No acute issues  CV: MIVF while NPO  GI: CLD for now pending GI eval, NPO at midnight in anticipation for possible procedure, D/C'd IV fluids  ID: " Continue Zosyn  PPX: SCD    Discussed with chief resident.    Isatu Smith, DO  PGY1

## 2018-02-04 NOTE — PLAN OF CARE
Problem: Patient Care Overview  Goal: Plan of Care/Patient Progress Review  Patient alert and orientated. Vitals stable. Negligible pain  Advanced to regular diet with no difficulty.   Abdominal dressing packed with Kerlix with wet to dry dressing. Up ad hola. Voiding adequate amount of urine. Passing Gas. Having small smears of stool as per patient.   Plan is to be NPO after midnight.

## 2018-02-05 ENCOUNTER — ANESTHESIA EVENT (OUTPATIENT)
Dept: SURGERY | Facility: CLINIC | Age: 28
End: 2018-02-05

## 2018-02-05 ENCOUNTER — ANESTHESIA (OUTPATIENT)
Dept: SURGERY | Facility: CLINIC | Age: 28
End: 2018-02-05

## 2018-02-05 LAB — GLUCOSE BLDC GLUCOMTR-MCNC: 96 MG/DL (ref 70–99)

## 2018-02-05 PROCEDURE — 25000128 H RX IP 250 OP 636: Performed by: STUDENT IN AN ORGANIZED HEALTH CARE EDUCATION/TRAINING PROGRAM

## 2018-02-05 PROCEDURE — G0463 HOSPITAL OUTPT CLINIC VISIT: HCPCS

## 2018-02-05 PROCEDURE — 25000132 ZZH RX MED GY IP 250 OP 250 PS 637: Performed by: STUDENT IN AN ORGANIZED HEALTH CARE EDUCATION/TRAINING PROGRAM

## 2018-02-05 PROCEDURE — E2402 NEG PRESS WOUND THERAPY PUMP: HCPCS

## 2018-02-05 PROCEDURE — 12000001 ZZH R&B MED SURG/OB UMMC

## 2018-02-05 PROCEDURE — 00000146 ZZHCL STATISTIC GLUCOSE BY METER IP

## 2018-02-05 PROCEDURE — 97606 NEG PRS WND THER DME>50 SQCM: CPT

## 2018-02-05 RX ORDER — LIDOCAINE 40 MG/G
CREAM TOPICAL
Status: CANCELLED | OUTPATIENT
Start: 2018-02-05

## 2018-02-05 RX ORDER — OXYCODONE HYDROCHLORIDE 5 MG/1
5 TABLET ORAL EVERY 6 HOURS PRN
Status: DISCONTINUED | OUTPATIENT
Start: 2018-02-05 | End: 2018-02-05

## 2018-02-05 RX ORDER — OXYCODONE HYDROCHLORIDE 5 MG/1
5 TABLET ORAL EVERY 8 HOURS PRN
Status: DISCONTINUED | OUTPATIENT
Start: 2018-02-05 | End: 2018-02-07 | Stop reason: HOSPADM

## 2018-02-05 RX ADMIN — ACETAMINOPHEN 650 MG: 325 TABLET, FILM COATED ORAL at 00:53

## 2018-02-05 RX ADMIN — PIPERACILLIN SODIUM AND TAZOBACTAM SODIUM 3.38 G: 36; 4.5 INJECTION, POWDER, FOR SOLUTION INTRAVENOUS at 15:36

## 2018-02-05 RX ADMIN — PIPERACILLIN SODIUM AND TAZOBACTAM SODIUM 3.38 G: 36; 4.5 INJECTION, POWDER, FOR SOLUTION INTRAVENOUS at 20:30

## 2018-02-05 RX ADMIN — DIPHENHYDRAMINE HYDROCHLORIDE 25 MG: 25 CAPSULE ORAL at 03:08

## 2018-02-05 RX ADMIN — PIPERACILLIN SODIUM AND TAZOBACTAM SODIUM 3.38 G: 36; 4.5 INJECTION, POWDER, FOR SOLUTION INTRAVENOUS at 09:38

## 2018-02-05 ASSESSMENT — PAIN DESCRIPTION - DESCRIPTORS
DESCRIPTORS: SORE
DESCRIPTORS: SORE

## 2018-02-05 NOTE — PLAN OF CARE
Problem: Patient Care Overview  Goal: Plan of Care/Patient Progress Review  Outcome: No Change    AVSS. Tylenol given for discomfort at incision, benadryl for sleep.  NPO, denies nausea, feels a little bloated, would like miralax.  Voiding spont. UAL.

## 2018-02-05 NOTE — PLAN OF CARE
Problem: Patient Care Overview  Goal: Plan of Care/Patient Progress Review  Outcome: No Change  VSS. Up ad hola. Regular diet. Tolerating well. Voiding adequately. +PG/+BM. Complaining this evening of cramping abdominal pain. Simethicone ordered. Surgical plan still pending for approval tomorrow. Surgical shower completed this evening. Midline incision needs to be change after shower. Wet to dry.     Pt would NOT like to awoken for bedside shift report.

## 2018-02-05 NOTE — PROGRESS NOTES
Care Coordinator Progress Note     Admission Date/Time:  2/3/2018  Attending MD:  Mitesh Butler MD     Data  Chart reviewed, discussed with interdisciplinary team.   Patient was admitted for: Abcess.    Concerns with insurance coverage for discharge needs: None.  Current Living Situation: Patient lives with family.  Support System: Supportive and Involved  Services Involved: no services involved prior to admission  Transportation: Parents or sister will provide ride home.  Barriers to Discharge: medical clearance         Current Clinical update per chart review:   Patient had a exploratory laparotomy on 1/18/18 with appendectomy for a ruptured appendix who now presented to an OSH earlier yesterday evening for fever, nausea, and general malaise. A CT abdomen was obtained and showed an intraabdominal fluid collection between the pancreas and stomach measuring 5.0 x 2.5 cm, there is an additional smaller abscess to the R of her midline. She was transferred here for definitive management of this. She was started on IV Vancomycin and Zosyn at the OSH.    Current Assessment:  This RNCC met with patient at the bedside to discuss DC planning, patient sates that she previously went to her parents home in [place] after surgery and was performing her own wet to dry dressing changes independently. Patients plan is to return to her parents home to recuperate and will continue to do her own BID dressing changes , denied need for home care.  In reviewing chart was noted that she has appointment scheduled for February 8, 2018 to follow up with general surgery and establish PCP care with Dr. Burrell @ [facility]. Patient is okay with cancelling and rescheduling.    RNCC spoke with Dr. RODRI Navarro, regarding above appointment, was directed to cancel and they would decide on plan when discharge is imminent. Dr. Navarro states wound VAC will not be placed and patient will continue  with BID dressing changes , patient is independent with  cares. No home care needs identified.      Cancelled  New Patient Visit with Ryan Crain MD  [facility] General Surgery ([facility])   [phone]    Cancelled  New Patient Visit with Neli Burrell MD  [facility] ([facility])      Plan  Anticipated Discharge Date:  TBD  Anticipated Discharge Plan:  Home    Vidhya DAVIDN [facility]  Float Care Coordinator  [phone]

## 2018-02-05 NOTE — PROGRESS NOTES
Note by Dr Navarro filed after this writer's note today stated no wound vac.  Wound vac initiated prior to MD note.  Paged MD and spoke with him..  OK for wound vac, will leave on for now and change Wednesday per plan.      Fernanda Osborne

## 2018-02-05 NOTE — PROGRESS NOTES
"GI brief note    Chart reviewed.  Patient well controlled no nausea, vomiting.  Having BM's and no constitutional symptoms.    /65 (BP Location: Left arm)  Pulse 89  Temp 96.8  F (36  C) (Axillary)  Resp 15  Ht 1.727 m (5' 8\")  Wt 63.2 kg (139 lb 4.8 oz)  SpO2 99%  BMI 21.18 kg/m2     Labs notable for decreasing white count    Imaging reviewed.  To view images, you must open CT via PACS application, not through chart. Open application and search by last name \"Monae\" - do not type in \"Yuliana.\" Look on the list for \"[place], Yuliana, Olinda\" and click \"CT missing exam.\"    A:  Patient appears to be improving on antibiotics.  It is not clear the abscess is walled off well enough for endoscopic intervention.  Patient has been placed on the schedule as a place pandey but may not pursue intervention.  Will review with rest of advanced endoscopy team tomorrow.      Plan:  -NPO at midnight  -Insure INR < 1.5, vitamin K today if not  -review imaging tomorrow with team  -potential for EUS with cystgastrostomy 2/5   -continue abx      Neel Schwarz MD  GI fellow  "

## 2018-02-05 NOTE — PROGRESS NOTES
[facility]  WO Nurse Inpatient Wound Assessment-KCI VAC Dressing changes     Initial Assessment of wound(s) on pt's:   Abdominal wound         Data:   Patient History:      per MD note(s): Patient had a exploratory laparotomy on 18 with appendectomy for a ruptured appendix who now presented to an OSH earlier yesterday evening for fever, nausea, and general malaise. A CT abdomen was obtained and showed an intraabdominal fluid collection between the pancreas and stomach measuring 5.0 x 2.5 cm, there is an additional smaller abscess to the R of her midline. She was transferred here for definitive management of this. She was started on IV Vancomycin and Zosyn at the OSH.    Moisture Management:  continent    Catheter secured? Not applicable    Current Diet / Nutrition:           Active Diet Order      NPO per Anesthesia Guidelines for Procedure/Surgery Except for: Meds           Neo Assessment and sub scores:   Neo Score  Av.7  Min: 19  Max: 22     Labs:                                                                       Recent Labs   Lab Test  18   1532  18   0722   18   0748   ALBUMIN   --    --    --   1.9*   HGB   --   10.9*   < >  13.2   RBC   --   3.41*   < >  3.97   WBC   --   6.4   < >  3.9*   PLT   --   377   < >  142*   INR  1.18*   --    < >   --    A1C   --    --    --   4.6    < > = values in this interval not displayed.              Wound Assessment : midline abdomen  Wound History:  S/P ex lap for perforated appendicitis.  Pt states she was healing normally until  when the incision was opened up at bedside.  2 staples remain superiorly.  Have been packing with NS moistened guaze.  Attempted to set wound vac at 100 mmHg, pt did not tolerate, set lower.           Age of wound/ surgical date: 20    Date KCI VAC placed: 2/     KCI Wound VAC initiated by:  [facility] Nurse: Yes  MD: No    Any other wound therapies tried prior to  KCI VAC placed? Yes - Location: NS moist guaze    Specific Dimensions (length x width x depth, in cm) :  9 x 7 x 1.8 cm      Wound Base: moist and granulation suture seen in base (bluish purple)    Palpation of the wound bed:  normal    Slough appearance:  none         Eschar appearance:  none    Periwound Skin: intact,      Color: normal and consistent with surrounding tissue    Temperature  normal     Drainage:   Amount: small . Color: serosanguinous     Odor: none    Pain:  moderate , aching with dressing change    Was patient premedicated prior to dressing change? No    Medication(s) used:            Intervention:     Patient's chart evaluated.      Wound was fully assessed.    Wound Care: was done:      Orders  Written    Supplies  ordered: medium dressing, canister    Discussed plan of care with Patient and Nurse          Assessment:       Appropriate for wound vac.         Plan:     Nursing to notify the Provider(s) and re-consult the WOC Nurse if wound(s) deteriorate(s).    Plan for care of wound located on Abdomen: wound vac continuous at 75mmHg.  Please see wound vac tip sheet on the intranet for troubleshooting.      WOC Nurse will return: MWSHANIA for wound vac dressing change    Face to face time: 30 minutes

## 2018-02-05 NOTE — PROGRESS NOTES
"CLINICAL NUTRITION SERVICES - ASSESSMENT NOTE     Nutrition Prescription    RECOMMENDATIONS FOR MDs/PROVIDERS TO ORDER:  None at this time.    Malnutrition Status:    Patient does not meet two of the above criteria necessary for diagnosing malnutrition but is at risk for malnutrition.    Recommendations already ordered by Registered Dietitian (RD):  Supplements PRN with meals    Future/Additional Recommendations:  Encourage small frequent meals high in calories and protein for LBM maintenance.      REASON FOR ASSESSMENT  Yuliana Licona is a/an 27 year old female assessed by the dietitian for Admission Nutrition Risk Screen for unintentional loss of 10# or more in the past two months.    NUTRITION HISTORY  Pt had an exploratory laparotomy on 01/18 with appendectomy for ruptured appendix and was found to have an intraabdominal fluid collection on CT concerning for an intraabdominal abscess. Pt with wound vac placed today.     Per pt, she was NPO/ice chips only for 5 days post-op and then advanced to clears ~day 6 post-op. After discharge, she was eating 2 \"solid meals\" per day consisting of carnation instant breakfast in the morning, sandwich for lunch, and then hot dish or meat potato (pork chops) for dinner plus maybe some snacks (crackers) throughout the day. Pt says at baseline she does not eat breakfast, has a sandwich or grab-and-go items for lunch and then larger dinner. She started drinking [place] Instant Breakfast after surgery to add protein to her diet. Yesterday pt ate sandwich with a side of mac-and-cheese and she says this might have been too much food and led to the bloating.     Pt aware of what types of foods contain high calories and protein and is motivated to gain weight back to baseline. Largest barrier to eating is slow diet advancement and bloating that began on Saturday per pt. She was only able to drink water on Sat.    CURRENT NUTRITION ORDERS  Diet: NPO    LABS  Labs " "reviewed    MEDICATIONS  Medications reviewed    ANTHROPOMETRICS  Height: 172.7 cm (5' 8\")  Most Recent Weight: 63.2 kg (139 lb 4.8 oz)    IBW: 63.6 kg  BMI: Normal BMI  Weight History: wt loss of 15 lbs (10%) in 2 weeks post-op  Wt Readings from Last 10 Encounters:   02/03/18 63.2 kg (139 lb 4.8 oz)   01/22/18 70.1 kg (154 lb 8 oz)   08/10/15 68 kg (150 lb)     Dosing Weight: 63 kg (actual) - based on lowest documented wt so far this adm (63.2 kg on 2/3) and IBW of 63.6 kg.     ASSESSED NUTRITION NEEDS  Estimated Energy Needs: 9258-2956 kcals/day (25 - 30+ kcals/kg)  Justification: Maintenance  Estimated Protein Needs: 76-95 grams protein/day (1.2 - 1.5 grams of pro/kg)  Justification: Post-op and Repletion (wt loss)  Estimated Fluid Needs: (1 mL/kcal)   Justification: Per provider pending fluid status    PHYSICAL FINDINGS  See malnutrition section below.    MALNUTRITION  % Intake: Decreased intake does not meet criteria  % Weight Loss: > 2% in 1 week (severe)  Subcutaneous Fat Loss: None observed  Muscle Loss: Temporal:  Mild - pt says she noticed some loss in her face a couple weeks ago but started to improve back to normal when eating at her parents house.   Fluid Accumulation/Edema: None noted  Malnutrition Diagnosis: Patient does not meet two of the above criteria necessary for diagnosing malnutrition but is at risk for malnutrition.    NUTRITION DIAGNOSIS  Inadequate oral intake related to bloating with eating and slow diet advancement as evidenced by pt with wt loss of 15 lbs (10%) in 2 weeks post-op.    INTERVENTIONS  Implementation  1. Nutrition Education: Encouraged pt to eat 4-6 small meals per day while healing to increase calorie and protein intake and potential to reduce bloating with eating. Also encouraged pt to continue [place] Instant Breakfast and aim to eat foods higher in calories and protein. Provided the following handouts: Tips to Increase Calories in Your Diet & High-Calorie, High-Protein " Recipes. Also provided pt with in-house supplement list.   2. Medical food supplement therapy - with meals PRN.     Goals  Patient to consume % of nutritionally adequate meal trays TID, or the equivalent with supplements/snacks.     Monitoring/Evaluation  Progress toward goals will be monitored and evaluated per protocol.    Cammy Cosme RD, AUDI  Unit 7C pgr: [phone]

## 2018-02-05 NOTE — PROGRESS NOTES
General Surgery Progress Note  Yuliana Monae      Subjective  No acute overnight events.  Denies any nausea or vomiting. Passing flatus. No fever/chills.    Objective  Temp:  [96.5  F (35.8  C)-97.9  F (36.6  C)] 97.9  F (36.6  C)  Pulse:  [69-89] 77  Heart Rate:  [89] 89  Resp:  [15-16] 16  BP: (107-118)/(63-75) 118/75  SpO2:  [97 %-99 %] 99 %    Intake/Output Summary (Last 24 hours) at 02/04/18 0818  Last data filed at 02/04/18 0659   Gross per 24 hour   Intake             3120 ml   Output             1920 ml   Net             1200 ml       Physical Exam:  Gen: NAD, resting comfortably  Chest: RRR, non-labored breathing on RA  Abd: soft, minor distention, non-tender. Midline incision with dressing in place, healthy granulation tissue in wound bed.  Ext: WWP    Results:  BMP    Recent Labs  Lab 02/04/18  0722 02/03/18  0725    137   POTASSIUM 3.5 3.8   CHLORIDE 106 106   CO2 22 23   BUN 5* 6*   CR 0.41* 0.52   GLC 66* 107*     CBC    Recent Labs  Lab 02/04/18  0722 02/03/18  0725   WBC 6.4 15.9*   HGB 10.9* 10.0*    433     LFT    Recent Labs  Lab 02/04/18  1532 02/03/18  0911   INR 1.18* 1.37*       Recent Labs  Lab 02/04/18  0722 02/03/18  0725   GLC 66* 107*       Assessment & Plan  27 year old female s/p exploratory laparotomy for perforated appendicitis on 01/18/18 now with intraabdominal fluid collection on CT concerning for abscess.    - GI recs to continue abx as there is no well-defined rim of the fluid collection and to repeat CT in 2 weeks.  - No wound vac for now    -schedule Tylenol, PRN oxy when tolerating PO intake  -ADAT  -D/c IVF  -Continue Zosyn  PPX: Reed Navarro MD  PGY-1 Surgery

## 2018-02-05 NOTE — PROGRESS NOTES
"                                                                   Gastroenterology Consult Note  Yuliana Licona MRN: [id number]  1990  Date of Admission:2/3/2018           ASSESSMENT AND RECOMMENDATIONS:   Assessment:  27 year old female with a history of perforated appendicitis s/p wash out 1/18/18 who after a one week hospitalization re-presented on 2/2/18 with 102 F, headaches, \"queasy stomach\" and CT finding of 5 cm lesser sac abscess. IR was unable to find a window to drain and GI consulted for possible transgastric drainage. Patient remains clinically stable and doing very well on antibiotics. Abscess not walled-off so will not recommend intervention at this time.       Recommendations  - Continue antibiotic coverage for abscess  - Repeat CT in 2 weeks to assess abscess   - Defer symptom management to primary team    Patient seen and discussed with Guru Jensen MD GI staff.    Eusebio Lopez MD  Internal Medicine, PGY2  Gastroenterology Service  [phone]    INTERVAL HISTORY:   Nursing notes reviewed. Patient remains afebrile as since arriving on floor. Tolerating diet well. Denies pain aprt from usual post of pain. Normal bowel movements.    MEDICATIONS:   Current Facility-Administered Medications   Medication     simethicone (MYLICON) chewable tablet 80 mg     lidocaine 1 % 1 mL     lidocaine (LMX4) kit     sodium chloride (PF) 0.9% PF flush 3 mL     sodium chloride (PF) 0.9% PF flush 3 mL     naloxone (NARCAN) injection 0.1-0.4 mg     ondansetron (ZOFRAN-ODT) ODT tab 4 mg    Or     ondansetron (ZOFRAN) injection 4 mg     prochlorperazine (COMPAZINE) injection 10 mg    Or     prochlorperazine (COMPAZINE) tablet 10 mg    Or     prochlorperazine (COMPAZINE) Suppository 25 mg     HYDROmorphone (DILAUDID) injection 0.2 mg     diphenhydrAMINE (BENADRYL) capsule 25-50 mg     piperacillin-tazobactam (ZOSYN) 3.375g in 15 mL NS Premix Syringe     acetaminophen (TYLENOL) tablet 650 mg     OBJECTIVE: " "  /65 (BP Location: Right arm)  Pulse 69  Temp 96.8  F (36  C) (Oral)  Resp 16  Ht 1.727 m (5' 8\")  Wt 63.2 kg (139 lb 4.8 oz)  SpO2 96%  BMI 21.18 kg/m2    Exam:  Constitutional: healthy, alert and no distress  Head: Normocephalic. No masses, lesions, tenderness or abnormalities  Neck: Neck supple. No adenopathy. Thyroid symmetric, normal size,, Carotids without bruits.  ENT: ENT exam normal, no neck nodes or sinus tenderness  Cardiovascular: negative, PMI normal. No lifts, heaves, or thrills. RRR. No murmurs, clicks gallops or rub  Respiratory: negative, Percussion normal. Good diaphragmatic excursion. Lungs clear  Gastrointestinal: Abdomen soft, mild tenderness at surgical site, clean and dry dressing. BS normal. No masses, organomegaly  Musculoskeletal: extremities normal- no gross deformities noted, gait normal and normal muscle tone  Skin: no suspicious lesions or rashes  Neurologic: Gait normal. Reflexes normal and symmetric. Sensation grossly WNL.  Psychiatric: mentation appears normal and affect normal/bright  Hematologic/Lymphatic/Immunologic: Normal cervical lymph nodes    ROUTINE IP LABS  BMP  Recent Labs  Lab 02/04/18  0722 02/03/18  0725    137   POTASSIUM 3.5 3.8   CHLORIDE 106 106   MOE 8.4* 8.3*   CO2 22 23   BUN 5* 6*   CR 0.41* 0.52   GLC 66* 107*     CBC  Recent Labs  Lab 02/04/18  0722 02/03/18  0725   WBC 6.4 15.9*   RBC 3.41* 3.10*   HGB 10.9* 10.0*   HCT 34.1* 31.5*    102*   MCH 32.0 32.3   MCHC 32.0 31.7   RDW 12.6 12.9    433     INR  Recent Labs  Lab 02/04/18  1532 02/03/18  0911   INR 1.18* 1.37*     Lab Results   Component Value Date    AST 13 01/18/2018     Lab Results   Component Value Date    ALT 9 01/18/2018     No results found for: BILICONJ   Lab Results   Component Value Date    BILITOTAL 1.1 01/18/2018     Lab Results   Component Value Date    ALBUMIN 1.9 01/18/2018     Lab Results   Component Value Date    PROTTOTAL 4.8 01/18/2018      Lab " Results   Component Value Date    ALKPHOS 39 01/18/2018

## 2018-02-05 NOTE — PLAN OF CARE
Problem: Patient Care Overview  Goal: Plan of Care/Patient Progress Review  Outcome: No Change  [id number]  VSS. Plan for wound vac placement today on open midline wound. Currently dressing with wet to dry gauze and covered with ABD. Up ind. NPO. Surgical shower completed last night. To have scrub before surgery takes place. PIV saline locked. IV abx. Denies pain, nausea, sob and chest pain. Continue POC.

## 2018-02-06 PROCEDURE — 12000001 ZZH R&B MED SURG/OB UMMC

## 2018-02-06 PROCEDURE — 25000132 ZZH RX MED GY IP 250 OP 250 PS 637: Performed by: STUDENT IN AN ORGANIZED HEALTH CARE EDUCATION/TRAINING PROGRAM

## 2018-02-06 PROCEDURE — E2402 NEG PRESS WOUND THERAPY PUMP: HCPCS

## 2018-02-06 PROCEDURE — 25000128 H RX IP 250 OP 636: Performed by: STUDENT IN AN ORGANIZED HEALTH CARE EDUCATION/TRAINING PROGRAM

## 2018-02-06 RX ORDER — METRONIDAZOLE 500 MG/1
500 TABLET ORAL EVERY 12 HOURS SCHEDULED
Status: DISCONTINUED | OUTPATIENT
Start: 2018-02-06 | End: 2018-02-07 | Stop reason: HOSPADM

## 2018-02-06 RX ORDER — CIPROFLOXACIN 500 MG/1
500 TABLET, FILM COATED ORAL EVERY 12 HOURS SCHEDULED
Status: DISCONTINUED | OUTPATIENT
Start: 2018-02-06 | End: 2018-02-07 | Stop reason: HOSPADM

## 2018-02-06 RX ADMIN — CIPROFLOXACIN HYDROCHLORIDE 500 MG: 500 TABLET, FILM COATED ORAL at 20:09

## 2018-02-06 RX ADMIN — DIPHENHYDRAMINE HYDROCHLORIDE 25 MG: 25 CAPSULE ORAL at 03:15

## 2018-02-06 RX ADMIN — PIPERACILLIN SODIUM AND TAZOBACTAM SODIUM 3.38 G: 36; 4.5 INJECTION, POWDER, FOR SOLUTION INTRAVENOUS at 10:57

## 2018-02-06 RX ADMIN — ACETAMINOPHEN 650 MG: 325 TABLET, FILM COATED ORAL at 22:53

## 2018-02-06 RX ADMIN — PIPERACILLIN SODIUM AND TAZOBACTAM SODIUM 3.38 G: 36; 4.5 INJECTION, POWDER, FOR SOLUTION INTRAVENOUS at 03:05

## 2018-02-06 RX ADMIN — METRONIDAZOLE 500 MG: 500 TABLET ORAL at 20:09

## 2018-02-06 ASSESSMENT — PAIN DESCRIPTION - DESCRIPTORS: DESCRIPTORS: SORE

## 2018-02-06 NOTE — PROGRESS NOTES
Documentation of Face to Face and Certification for Home Health Services    I certify that patient: Yuliana Licona is under my care and that I, or a nurse practitioner or physician's assistant working with me, had a face-to-face encounter that meets the physician face-to-face encounter requirements with this patient on: February 6, 2018.    This encounter with the patient was in whole, or in part, for the following medical condition, which is the primary reason for home health care: Abdominal Wound Cares Monday, Wednesday and Fridays.    I certify that, based on my findings, the following services are medically necessary home health services: Skilled home care RN.    My clinical findings support the need for the above services because: Patient will benefit from 3 times a week dressing changes after admitted with wound infection after surgery for a perforated appendix.  Patient has a fever and required IV antibiotics during hospitalization in the acute care hospital setting.    Further, I certify that my clinical findings support that this patient is homebound patient is deconditioned secondary to multiple surgeries and wound infection requiring IV antibiotics    Based on the above findings. I certify that this patient is confined to the home and needs intermittent skilled nursing care, physical therapy and/or speech therapy.  The patient is under my care, and I have initiated the establishment of the plan of care.  This patient will be followed by a physician who will periodically review the plan of care.  Physician/Provider to provide follow up care: Dr. Mitesh Butler M.D.    Attending hospital physician (the Medicare certified [facility] provider): Dr. Mitesh Butler M.D.  Physician Signature: See electronic signature associated with these discharge orders.    Date: 2/6/2018

## 2018-02-06 NOTE — PLAN OF CARE
Problem: Patient Care Overview  Goal: Plan of Care/Patient Progress Review  Outcome: No Change  8337-1815: A&Ox4, VSS on RA. Up independently in room. Denies pain, nausea, & SOB. Left PIV SL'd. Wound vac to abdominal wound CDI with minimal output. Tolerating clear liquid diet & PO fluids. Voiding without difficulty, recording on whiteboard, large BM this evening. Pt declines bedside report if sleeping. Continue to monitor & follow POC.

## 2018-02-06 NOTE — PLAN OF CARE
"Problem: Patient Care Overview  Goal: Plan of Care/Patient Progress Review  Outcome: Improving  VSS. Afebrile. Denies pain or nausea. Abdominal wound vac to cont suction with minimal serosanguinous drainage. WOC nurse to change qMWF. Benadryl given for sleep early this AM (pt is a \"night owl\"). Pt tolerated FLD so advanced to regular diet. Looking forward to trying eggs for breakfast. Had a small BM overnight. Voiding without difficulty; pt records on white board but does forget to at times. PIV saline locked. Continues on IV Zosyn. Up ad hola. No acute events ON. Cont to monitor and with POC.      "

## 2018-02-06 NOTE — PLAN OF CARE
Problem: Patient Care Overview  Goal: Plan of Care/Patient Progress Review  Outcome: No Change  VSS. Pt has not requested pain medication this shift. Voiding, but not always saving. +Gas, no real BM yet. Wound vac on abdominal wound, intact with minimal output. Tolerating clear liquids. Pt has been resting a lot since she hasn't had a good nights sleep in a couple days. Pt declines bedside if sleeping.

## 2018-02-06 NOTE — PROGRESS NOTES
"Gastroenterology Fellow Brief Sign off Note:    27 year old female with a history of perforated appendicitis s/p wash out 1/18/18 who after a one week hospitalization re-presented on 2/2/18 with 102 F, headaches, \"queasy stomach\" and CT finding of 5 cm lesser sac abscess. IR was unable to find a window to drain and GI consulted for possible transgastric drainage. Patient remains clinically stable and doing very well on antibiotics. Abscess not walled-off so our team recommended no intervention at this time.    Patient should get a repeat CT scan in 2 weeks to assess the size of the abscess. The CT can be followed by the primary team and if the size of the abscess is increasing and the collection appears to be completely walled off, Gastroenterology team can be consulted if drainage is clinically felt necessary.    Gastroenterology will sign off at this time. Please do not hesitate to contact us with questions or concerns.    Gabby Telles  Gastroenterology Fellow  [phone]  "

## 2018-02-06 NOTE — PROGRESS NOTES
Surgery Progress Note  2/6/2018     Subjective:  - JENNY overnight.  - No complaints of pain. Denies N/V. + flatus, last BM this AM.    Objective:  Temp:  [96.3  F (35.7  C)-97.7  F (36.5  C)] 96.3  F (35.7  C)  Pulse:  [72] 72  Heart Rate:  [67-76] 67  Resp:  [15-18] 18  BP: (102-111)/(57-70) 105/70  SpO2:  [97 %-99 %] 98 %  I/O last 3 completed shifts:  In: 680 [P.O.:680]  Out: 500 [Urine:500]    Gen: Awake, alert, NAD   Resp: NLB on RA  Abd: Soft, ND, NT to palpation  Ext: WWP  Dressing/Incision: Wound vac in place without obvious signs of leak at 75mmHg    BMP  Recent Labs  Lab 02/04/18  0722 02/03/18  0725    137   POTASSIUM 3.5 3.8   CHLORIDE 106 106   MOE 8.4* 8.3*   CO2 22 23   BUN 5* 6*   CR 0.41* 0.52   GLC 66* 107*   MAG 1.9 1.7   PHOS 3.0 3.6     CBC  Recent Labs  Lab 02/04/18  0722 02/03/18  0725   WBC 6.4 15.9*   RBC 3.41* 3.10*   HGB 10.9* 10.0*   HCT 34.1* 31.5*    102*   MCH 32.0 32.3   MCHC 32.0 31.7   RDW 12.6 12.9    433     INR  Recent Labs  Lab 02/04/18  1532 02/03/18  0911   INR 1.18* 1.37*      AST/ALT & Alk PhosNo lab results found in last 7 days.  Bili  Recent Labs   Lab Test  01/18/18   0748  01/18/18   0058   BILITOTAL  1.1  1.3   DBIL  0.7*   --      Lipase/AmlyaseNo lab results found in last 7 days.    A/P: Yuliana Licona is a 27 year old female s/p ex lap for perforated appendicitis 1/18/18 now with intraabdominal fluid collection on CT concerning for abscess. Both IR and GI felt that fluid collection was not amenable to drainage and recommended conservative management. Afebrile x24 hours, has not required pain medications x24 hours, tolerating adequate PO intake, maintaining adequate UOP, now with wound vac in place.       - Wound bed to be examined by staff MD at time of wound vac chance tomorrow   - Transition to PO antibiotics today: Cipro/Flagyl for 14 of total abx   - Consider discharge tomorrow if afebrile on PO antibiotics    Scribed by Neli Edward MS3 for  Sav Navarro MD    D/w chief resident +/- staff.    Sav Navarro MD  PGY-1 Surgery  pager [phone]  (6AM-5PM weekdays please page primary team, nights/wknds/holidays, page job [phone])

## 2018-02-06 NOTE — PROGRESS NOTES
Transition Planning Follow Up    D:  Attempted to page the MD team to inquire about a plan of care on behalf of patient.  No return page.  1.5 hours later talked with a medical student on the team who confirmed that patient will in fact need a [employer] Wound Vac for home use.  Required Paper Work and Wound Therapy Authorization Form was completed and waiting for MD team member who stated he will be responsible for signing the form to arrive for a signature.  Once signature is obtained, form will be faxed to [facility] for an insurance inquiry for authorization for home use.     [facility] will be contacted as the home care agency to assist with home wound cares:    Please fax discharge orders to [facility] home Care [facility]-     Fax-     Skilled home care RN for initial home safety evaluation and 1-3 times a week to evaluate medication management, nutrition and hydration evaluation, endurance evaluation, and general status evaluation after discharge from the acute care hospital setting.     Skilled home care RN to assist with [employer] Wound Cares to abdomin every Monday, Wednesday and Friday per MD orders.     A/P:  Inpatient Cares continue per MD team plans of care.  The inpatient MD team Staff will have to sign the home care orders secondary to patient still not having the opportunity to have a first visit with a Primary MD.  Patient does not have a primary MD at the time of this admission.    Martha Pena, RODRI.S.N., P.H.N.,R.N.         Pager

## 2018-02-07 VITALS
DIASTOLIC BLOOD PRESSURE: 68 MMHG | SYSTOLIC BLOOD PRESSURE: 104 MMHG | HEIGHT: 68 IN | HEART RATE: 72 BPM | TEMPERATURE: 98.1 F | WEIGHT: 139.3 LBS | RESPIRATION RATE: 16 BRPM | BODY MASS INDEX: 21.11 KG/M2 | OXYGEN SATURATION: 98 %

## 2018-02-07 PROCEDURE — E2402 NEG PRESS WOUND THERAPY PUMP: HCPCS

## 2018-02-07 PROCEDURE — 25000132 ZZH RX MED GY IP 250 OP 250 PS 637: Performed by: STUDENT IN AN ORGANIZED HEALTH CARE EDUCATION/TRAINING PROGRAM

## 2018-02-07 PROCEDURE — 25000128 H RX IP 250 OP 636

## 2018-02-07 RX ORDER — HYDROMORPHONE HYDROCHLORIDE 1 MG/ML
INJECTION, SOLUTION INTRAMUSCULAR; INTRAVENOUS; SUBCUTANEOUS
Status: COMPLETED
Start: 2018-02-07 | End: 2018-02-07

## 2018-02-07 RX ORDER — CIPROFLOXACIN 500 MG/1
500 TABLET, FILM COATED ORAL EVERY 12 HOURS
Qty: 23 TABLET | Refills: 0 | Status: SHIPPED | OUTPATIENT
Start: 2018-02-07 | End: 2018-02-19

## 2018-02-07 RX ORDER — OXYCODONE HYDROCHLORIDE 5 MG/1
5 TABLET ORAL EVERY 8 HOURS PRN
Qty: 5 TABLET | Refills: 0 | Status: SHIPPED | OUTPATIENT
Start: 2018-02-07 | End: 2018-02-23

## 2018-02-07 RX ORDER — METRONIDAZOLE 500 MG/1
500 TABLET ORAL EVERY 12 HOURS
Qty: 23 TABLET | Refills: 0 | Status: SHIPPED | OUTPATIENT
Start: 2018-02-07 | End: 2018-02-19

## 2018-02-07 RX ORDER — HYDROMORPHONE HCL/0.9% NACL/PF 0.2MG/0.2
.2-.3 SYRINGE (ML) INTRAVENOUS ONCE
Status: COMPLETED | OUTPATIENT
Start: 2018-02-07 | End: 2018-02-07

## 2018-02-07 RX ORDER — OXYCODONE HYDROCHLORIDE 5 MG/1
5 TABLET ORAL EVERY 8 HOURS PRN
Qty: 5 TABLET | Refills: 0 | Status: SHIPPED | OUTPATIENT
Start: 2018-02-07 | End: 2018-02-07

## 2018-02-07 RX ADMIN — Medication 0.3 MG: at 16:48

## 2018-02-07 RX ADMIN — METRONIDAZOLE 500 MG: 500 TABLET ORAL at 08:58

## 2018-02-07 RX ADMIN — CIPROFLOXACIN HYDROCHLORIDE 500 MG: 500 TABLET, FILM COATED ORAL at 08:58

## 2018-02-07 RX ADMIN — DIPHENHYDRAMINE HYDROCHLORIDE 25 MG: 25 CAPSULE ORAL at 03:22

## 2018-02-07 RX ADMIN — ACETAMINOPHEN 650 MG: 325 TABLET, FILM COATED ORAL at 03:22

## 2018-02-07 ASSESSMENT — PAIN DESCRIPTION - DESCRIPTORS
DESCRIPTORS: CRUSHING;PENETRATING
DESCRIPTORS: SORE

## 2018-02-07 NOTE — PROGRESS NOTES
[facility] nursing office was called by Sav - resident who stated that their team was removing the VAC dressing and let [facility] nursing know if they would want the VAC back on today. A call was placed to Sav this afternoon and he was unable to say if we should placed the VAC because the team had not seen the wound at the time.

## 2018-02-07 NOTE — PLAN OF CARE
Problem: Patient Care Overview  Goal: Plan of Care/Patient Progress Review  Outcome: No Change  [id number]:   AVSS. Pt awake per usual until after 0300, works night shift. C/o left shoulder discomfort (since last hospital admission), managed with prn tylenol & heat packs. Regular diet tolerated. UAL. Voids spont, pt records voids for staff. Passing gas, no BM overnight. Wound vac intact over midline wound, scant serosanguineous drainage noted, on 100 mmHg cont suction. LEYDA NUNEZ. Possible DC today.

## 2018-02-07 NOTE — PROGRESS NOTES
Transition Planning Follow Up    Home Care services with [facility] has been confirmed.  Home KCI Wound Vac has been delivered to patient's hospital wound and paper work for the home wound vac system was signed by patient and original copy given to patient. Plan is for wound vac dressing change today and then per MD team, patient will discharge today and home care services will be initiated this upcoming Friday February 9, 2018.  Bedsides admission to home care, wound vac dressing change will also be done with plans of the wound care taking place every Monday, Wednesday and Friday at patient's home setting per MD orders.  Patient will follow up in clinic as will be outlined in discharge orders.    Martha Pena, B.S.N., P.H.N.,R.N.         Pager

## 2018-02-07 NOTE — DISCHARGE SUMMARY
"[facility]  Discharge Summary  General Surgery     Yuliana Licona MRN# [id number]   YOB: 1990 Age: 27 year old     Date of Admission:  2/3/2018  Date of Discharge::  2/7/2018  Admitting Physician:  Mitesh Butler MD  Discharge Physician:  Dr. Juan F Azul  Primary Care Physician:        No Ref-Primary, Physician          Admission Diagnoses:   Intra-abdominal abcess  Abscess, intra-abdominal, postoperative (H)  Abscess            Discharge Diagnosis:   There are no discharge diagnoses documented for the most recent discharge.            Procedures:   None          Brief History of Illness:   Taken from Admission H&P:  \"Yuliana Licona is a(n) 27 year old year old female with no significant PMHx who on 01/18 had a exploratory laparotomy with appendectomy for a ruptured appendix who now presented to an OSH earlier yesterday evening for fever, nausea, and general malaise. A CT abdomen was obtained and showed an intraabdominal fluid collection between the pancreas and stomach measuring 5.0 x 2.5 cm, there is an additional smaller abscess to the R of her midline. She was transferred here for definitive management of this. She is currently complaining of headaches, nausea and fevers. She was started on IV Vancomycin and Zosyn at the OSH. She notes occasional warmth with urination. Denies chest pains, SOB, swelling,\"           Hospital Course:   Patient was evaluated by both IR and GI to assess drainage of fluid collection in abdomen to which both teams felt it was not amenable to drainage given lack of well-defined wall. She was given conservative management of antibiotics with close follow-up. Prior to discharge, she was tolerating a regular diet, PO antibiotics (of 14 day course), having regular bowel movements, voiding independently, and pain well controlled. She was given instructions and plan for close follow-up and sent home with a wound vac.           Day of Discharge " "Physical Exam:   Blood pressure 104/68, pulse 72, temperature 98.1  F (36.7  C), temperature source Oral, resp. rate 16, height 1.727 m (5' 8\"), weight 63.2 kg (139 lb 4.8 oz), SpO2 98 %, not currently breastfeeding.    Gen: AAOx3, NAD  Pulm: Non-labored breathing  Abd: Soft, appropriately tender, no guarding or distention   Incision C/D/I with wound vac in place with adequate suction  Ext:  Warm and well-perfused         Final Pathology Result:   No pathology submitted           Discharge Instructions and Follow-Up:     Discharge Procedure Orders  Home care nursing referral   Referral Type: Home Health Therapies & Aides     MD face to face encounter   Order Comments: Documentation of Face to Face and Certification for Home Health Services    I certify that patient: Yuliana Licona is under my care and that I, or a nurse practitioner or physician's assistant working with me, had a face-to-face encounter that meets the physician face-to-face encounter requirements with this patient on: February 6, 2018.    This encounter with the patient was in whole, or in part, for the following medical condition, which is the primary reason for home health care: abdominal wound after complication from perforated appendix.    I certify that, based on my findings, the following services are medically necessary home health services: Skilled home care RN.    My clinical findings support the need for the above services because: Patient requires extensive wound cares for abdominal wound to heal and will benefit from a [facility] wound vac for 3 times a week wound care to decrease the chance of infection.  Patient was admitted with a high fever and the fever has improved with IV antibiotics in the inpatient hospital acute care setting.    Further, I certify that my clinical findings support that this patient is homebound because patient is deconditioned from infection and need for multiple wound cares.  She will discharge with the assistance " from her parents initially.    Based on the above findings. I certify that this patient is confined to the home and needs intermittent skilled nursing care, physical therapy and/or speech therapy.  The patient is under my care, and I have initiated the establishment of the plan of care.  This patient will be followed by a physician who will periodically review the plan of care.  Physician/Provider to provide follow up care:     Attending hospital physician (the Medicare certified [facility] provider): Dr. Mitesh Butler M.D  Physician Signature: See electronic signature associated with these discharge orders.    Date: 2/6/2018     Reason for your hospital stay   Order Comments: Perforated appendicitis with abscess formation     [facility]ult [facility] Follow-up and recommended labs and tests   Order Comments: Our nurse clinician will call you with updates. Call [phone] if you haven't heard regarding these updates within 7 days of discharge. You will follow up in surgery clinic in about 2 weeks. We will call to schedule that appointment.     Discharge Instructions   Order Comments: DIET: regular diet  You may shower and get incision wet 2 days after operation. Do not submerge, soak, or scrub incision or swim until seen in follow-up.   The steri-strips over the wound will fall off on their own in a couple of days.  The stitches do not need to be removed, they will be absorbed on their own.  No lifting, pushing, or pulling greater than 10-15 pounds x 6 weeks.  Stay hydrated. Narcotics can make you constipated. Take over the counter fiber (metamucil or benefiber) and stool softeners (Miralax, docusate or senna) if becoming constipated.   Call for fever greater than 101.5, chills, decreased urine output, increased size of incision, red skin around incision, bleeding, shortness of breath, or other concerns.   Transition to ibuprofen or tylenol/acetaminophen for pain control. Do not take tylenol/acetaminophen and acetaminophen  "containing narcotic (e.g., percocet or vicodin) at the same time.  No driving while taking narcotic pain medications.  In emergencies, call 911   For other Questions or Concerns;   A.) During weekday working hours (Monday through Friday 8am to 4:30pm)   Call [phone] and ask to speak to the Surgery Nurse.   B.) At nights (after 4:30pm), on weekends, or if urgent call [phone]  and tell the  \"I would like to page the Surgery Resident on call.\"     Discharge Equipment: Wound Vac   Order Comments: Negative Pressure Wound Therapy to -100 wound with continuous suction.     Cleanse wound with saline or wound cleanser. Dressing change 2-3 times per week. Change canister weekly and when full.     Supplies: black foam    Teach pt/family how to remove dressing and apply wet to damp dressing, in the event that dressing leaks or machine malfunctions.  Consult [facility] nurse.      Follow-up for wound care in WO clinic.   Order Specific Question Answer Comments   Additional Discharge Equipment Wound Vac               Home Health Care:   Arranged           Discharge Disposition:   Discharged to home      Condition at discharge: Stable         Consultations:   INTERVENTIONAL RADIOLOGY ADULT/PEDS IP CONSULT  GI PANCREATICOBILIARY ADULT IP CONSULT  VASCULAR ACCESS CARE ADULT IP CONSULT         Imaging Studies:     Results for orders placed or performed during the hospital encounter of 01/18/18   XR Abdomen 1 View    Narrative    Examination:  XR ABDOMEN 1 VW 1/18/2018 1:34 AM     Comparison: None.    History: abd pain eval for free air, include;     Findings: Single upright frontal radiograph of abdomen was obtained.  Lower part of the abdomen and pelvis are covered by shield.   Nonobstructive bowel gas pattern. There are no abnormal  calcifications. There are no abnormal soft tissue densities. There is  no free air. No evidence of pneumatosis. No portal venous gas. The  lung bases are unremarkable.      Impression    " Impression:   Limited evaluation of the mid and upper abdomen and lower chest:  1. Nonobstructive bowel gas pattern.  2. No evidence for free air in the abdomen.    I have personally reviewed the examination and initial interpretation  and I agree with the findings.    ADRIANA ANTONIO MD   CT Abdomen Pelvis w Contrast     Value    Radiologist flags (Urgent)     Pneumoperitoneum and peritonitis, possible perforated    Narrative    EXAMINATION: CT ABDOMEN PELVIS W CONTRAST, 1/18/2018 3:09 AM    TECHNIQUE:  Helical CT images from the lung bases through the  symphysis pubis were obtained with IV contrast.   DLP: 577 mGy*cm  Contrast: 92 mL Isovue-370    HISTORY: diffuse abdominal pain, peritonitis;     COMPARISON: None available.    FINDINGS:    Abdomen/Pelvis:  Liver: Unremarkable.  Biliary: Unremarkable.  Pancreas: Unremarkable.  Spleen: Unremarkable.  Adrenal Glands: Unremarkable.  Urinary Tract: No hydronephrosis. No hydroureter. No renal calculi.  Reproductive Organs: Within normal limits.    Bowel: Although evaluation of the appendix is due to extensive  inflammatory changes of mesentery, there is a tubular structure with  enhancing wall containing 2 hyperdense foci. This appearance is  suggestive of acute appendicitis with appendicolith possibly  perforated (series 3, image 32-41). Diffuse wall thickening and fold  thickening of small bowel throughout, it can be due to peritonitis or  enteritis. The colon is decompressed.    Peritoneum/Fluid: Moderate amount of free fluid in in the abdomen  between the bowel loops, within the pelvic and inferior to the liver.  Extraluminal air superior to the liver and stomach and in the lesser  sac. There is a rim-enhancing fluid collections in the pelvic  cul-de-sac suggestive of abscess formation, the largest measuring 2.4  x 1.8 cm in the right pelvic cul-de-sac (series 2, image 85).    Vessels: No aneurysmal dilatation of the abdominal aorta.   Lymph Nodes: Prominent  mesenteric and retroperitoneal lymph nodes,  likely reactive.  Bones/Soft Tissues: No aggressive osseous lesions.    Lower Thorax:  No pleural effusion. Visualized lung is clear. The heart size is  within normal limits.      Impression    IMPRESSION:   1. Moderate amount of free fluid in the abdomen and pelvis in addition  to extraluminal air consistent with pneumoperitoneum likely due to  perforation of intra-abdominal organs. There are rim-enhancing fluid  collections in the pelvic cul-de-sac suggestive of abscess formation.  2. Extensive mesenteric fat stranding and enhancing peritoneum  consistent with peritonitis.  3. Although evaluation of the appendix is limited due to extensive  inflammatory changes of mesentery, there is a tubular structure  measuring 11 mm with enhancing wall containing 2 hyperdense foci.  These changes are highly suspicious for an acute appendicitis with  appendicolith, possibly perforated.    [Urgent Result: Pneumoperitoneum and peritonitis, possible perforated  acute appendicitis]    Finding was identified on 1/18/2018 2:56 AM.     Dr. Palencia was contacted by Dr. Gonzalez at 1/18/2018 3:00 AM and  verbalized understanding of the urgent finding.      I have personally reviewed the examination and initial interpretation  and I agree with the findings.    ADRIANA ANTONIO MD   XR Chest Port 1 View    Narrative    XR CHEST PORT 1 VW 1/20/2018 11:30 AM    History: shortness of breath;     Comparison: None.    Findings: Lung volumes. Small bilateral pleural effusions with  associated bibasilar atelectasis/consolidation, left greater than  right. No appreciable pneumothorax. Bones, subcutaneous soft tissues,  visualized upper abdomen are unremarkable except for gas-filled  stomach.      Impression    Impression: Low lung volumes, with small bilateral pleural effusions  and left greater than right basilar atelectasis/consolidation.    RICKIE ARRINGTON   XR Abdomen Port 1 View    Narrative    Exam:   XR ABDOMEN PORT F1 VW, 1/20/2018 1:22 PM    History: eval abdominal distension;     Comparison:  CT abdomen/pelvis from 1/18/2018.    Findings:  Portable supine view of the abdomen. Marked distention of  the stomach with dilation of multiple, thickened small bowel loops. No  definite pneumatosis or portal venous gas. Skin staples overlying the  low abdomen. No abnormal calcifications. The visualized bones are  unremarkable. Soft tissue anasarca.      Impression    Impression:    Multiple abnormally dilated, thickened loops of small bowel are  similar in appearance to CT from 1/18/2014.    I have personally reviewed the examination and initial interpretation  and I agree with the findings.    RICKIE ARRINGTON              Medications Prior to Admission:     No prescriptions prior to admission.              Discharge Medications:     Discharge Medication List as of 2/7/2018  6:25 PM      START taking these medications    Details   ciprofloxacin (CIPRO) 500 MG tablet Take 1 tablet (500 mg) by mouth every 12 hours for 12 days, Disp-23 tablet, R-0, E-Prescribe      metroNIDAZOLE (FLAGYL) 500 MG tablet Take 1 tablet (500 mg) by mouth every 12 hours for 12 days, Disp-23 tablet, R-0, E-Prescribe         CONTINUE these medications which have CHANGED    Details   oxyCODONE IR (ROXICODONE) 5 MG tablet Take 1 tablet (5 mg) by mouth every 8 hours as needed for moderate to severe pain or other (for wound vac changes), Disp-5 tablet, R-0, Local Print         CONTINUE these medications which have NOT CHANGED    Details   acetaminophen (TYLENOL) 325 MG tablet Take 3 tablets (975 mg) by mouth 3 times daily as needed for mild pain or fever, Disp-100 tablet, R-0, E-Prescribe      polyethylene glycol (MIRALAX/GLYCOLAX) Packet Take 17 g by mouth daily, Disp-21 packet, R-0, E-Prescribe      fexofenadine (ALLEGRA ALLERGY) 180 MG tablet Take 1 tablet PO BID, Disp-60 tablet, R-3, E-Prescribe      cetirizine HCl 10 MG CAPS Take 1 tablet PO BID,  Disp-60 capsule, R-3, E-Prescribe             Pt was seen and discussed with Dr. Azul on day of discharge    Sav Navarro MD  PGY-1 Surgery

## 2018-02-08 ENCOUNTER — CARE COORDINATION (OUTPATIENT)
Dept: SURGERY | Facility: CLINIC | Age: 28
End: 2018-02-08

## 2018-02-08 ENCOUNTER — CARE COORDINATION (OUTPATIENT)
Dept: CARE COORDINATION | Facility: CLINIC | Age: 28
End: 2018-02-08

## 2018-02-08 DIAGNOSIS — S31.109D OPEN WOUND OF ABDOMINAL WALL, SUBSEQUENT ENCOUNTER: Primary | ICD-10-CM

## 2018-02-08 NOTE — PROGRESS NOTES
RN Post-Op/Post-Discharge Care Coordination Note    Ms. Yuliana Licona is a 27 year old female who was discharged from the hospital yesterday with an intraabdomianl abscess and wound VAC.  Spoke with Patient.    Support  Home care and recuperating at her parents home     Health Status  Fevers/chills: Patient denies any fever or chills.  Nausea/Vomiting: Patient denies nausea/vomiting.  Eating/drinking: Patient is able to eat and drink without any complaints.  Encouraged yogurt or Probiotic daily  Bowel habits: Patient reports having a normal bowel movement.  Drains (HILARIA): N/A  Incisions: Wound VAC in place and holding suction.  No obvious problems..    Pain: Minimal to none.  Some discomfort with wound VAC changes and only medicates at that time.    New Medications:  Oxycodone, Tylenol, Cipro (taking), Flagyl (taking)    Activity/Restrictions  No lifting in excess of 15-20 pounds for 3-4 weeks    Equipment  Wound VAC    All of her questions were answered.  She will call this office if she has any further questions and/or concerns.      Post op visit arranged with Dr. Azul 2/23 at 1130 and [facility] visit to follow (patient will bring supplies).    Whom and When to Call  Patient acknowledges understanding of how to manage any medication changes and   when to seek medical care.     Patient advised that if after hour medical concerns arise to please call [phone] and choose option 4 to speak to the physician on call.     Parents address:    [address] [place] [id number]

## 2018-02-08 NOTE — PLAN OF CARE
Problem: Patient Care Overview  Goal: Plan of Care/Patient Progress Review  Outcome: Adequate for Discharge Date Met: 02/07/18  Writer cared for pt from [id number].  AVSS. Pt denies nausea, tolerating regular diet. Pt c/o pain with wound vac changes, dilaudid given once per MD STAT order, pt reported relief. Wound vac changed per MDs, DC'd with portable home vac in place. Writer gave simple wound vac instructions. Pt has home care for wound vac dressing changes MWF during the week. Pt to take prescribed oxycodone prior to dressing changes to prevent pain. Wound with minimal serosanguinous drainage, red and moist. Writer went through PR instructions with pt, pt voiced understanding. Medications picked up at [facility] pharmacy, is going home with oral abx for infection involving abdominal abscess. Pt DC'd at 1830 with transportation from friend.

## 2018-02-08 NOTE — LETTER
2/8/2018           TO: Yuliana Licona  [address]   [place] [facility] [address]           Dear Yuliana,  This letter serves as a confirmation of what we discussed in our recent phone conversation.        Appointment date 2/23/18   Appointment time 11:30 AM with Dr. Azul and appointment to follow with wound VAC nurse.  Please bring a sponge.   Provider Juan F Azul MD and Wound Care Nurse   Location [facility] GENERAL SURGERY  [address]  4th Floor ([address]K)  [place] [address]  Phone: [phone], #3  Fax: [phone]     Sincerely,  Gertrudis DUMONT RN  Phone Number: [phone], #3

## 2018-02-13 ENCOUNTER — CARE COORDINATION (OUTPATIENT)
Dept: SURGERY | Facility: CLINIC | Age: 28
End: 2018-02-13

## 2018-02-22 ENCOUNTER — TELEPHONE (OUTPATIENT)
Dept: SURGERY | Facility: CLINIC | Age: 28
End: 2018-02-22

## 2018-02-22 NOTE — TELEPHONE ENCOUNTER
Established Patient Telephone Reminder Call    Date of call:  02/22/18  Phone numbers:  Home number on file [phone] (home)    Reached patient/confirmed appointment:  No -voicemail not set up  Appointment with:   Dr. Juan F zAul  Reason for visit:  Follow up-Wound VAC

## 2018-02-23 ENCOUNTER — OFFICE VISIT (OUTPATIENT)
Dept: WOUND CARE | Facility: CLINIC | Age: 28
End: 2018-02-23
Payer: COMMERCIAL

## 2018-02-23 ENCOUNTER — OFFICE VISIT (OUTPATIENT)
Dept: SURGERY | Facility: CLINIC | Age: 28
End: 2018-02-23
Payer: COMMERCIAL

## 2018-02-23 VITALS
WEIGHT: 138.4 LBS | SYSTOLIC BLOOD PRESSURE: 117 MMHG | BODY MASS INDEX: 20.98 KG/M2 | HEIGHT: 68 IN | DIASTOLIC BLOOD PRESSURE: 82 MMHG | HEART RATE: 106 BPM | TEMPERATURE: 97.6 F | OXYGEN SATURATION: 100 %

## 2018-02-23 DIAGNOSIS — Z09 FOLLOW-UP EXAMINATION FOLLOWING SURGERY: Primary | ICD-10-CM

## 2018-02-23 DIAGNOSIS — S31.109A OPEN ABDOMINAL WALL WOUND, INITIAL ENCOUNTER: Primary | ICD-10-CM

## 2018-02-23 ASSESSMENT — PAIN SCALES - GENERAL: PAINLEVEL: NO PAIN (0)

## 2018-02-23 NOTE — NURSING NOTE
"Chief Complaint   Patient presents with     Surgical Followup     Wound/VAC        Vitals:    02/23/18 1113   BP: 117/82   Pulse: 106   Temp: 97.6  F (36.4  C)   TempSrc: Oral   SpO2: 100%   Weight: 138 lb 6.4 oz   Height: 5' 8\"       Body mass index is 21.04 kg/(m^2).    Reno Hernandez CMA                          "

## 2018-02-23 NOTE — PROGRESS NOTES
5.5 x 2.5  Patient comes to wound clinic for wound assessment per request of dr. Guthrie. She has history of a Open surgical wound due to appendicitis perforated bowel on January 18, 2018  Clean gloves are donned and current dressing removed. Previous treatment includes: Wound VAC  This is treatment week:1    Objective findings:      Location: Midline abdominal wound     Wound measured.: L 5.5 cm x, W 2.5 cm x, D 0.3  cm, Full thickness.    Wound description: no sign of infection    Tenderness:no    Odor: none     Drainage is light, serosanguinous     Wound Base: moist and granulation     Palpation of the wound bed:  normal    Slough appearance:  none       Eschar appearance:  none       Periwound Skin: intact,      Subjective finding:     Pt states: Doing well with wound VAC changes but she does not like carrying the wound VAC around    Patient is assessed for discomfort which is: minimal    Today's status of the wound: initial assessment    Treatment: Wound VAC was removed.  Wound cleansed with micro-cleanse.  Discussed with patient to continue with the wound VAC or to do wet-to-dry dressing changes.  Patient had been doing wet-to-dry dressing changes previously and would prefer to go back to that rather than having the wound VAC.  She will see Dr. Perez during this visit also and she will discuss this with him.  Pt received the following instructions:    Cleansing with Microklenz.Irrigate with Microklenz Primary Dressing moist gauze. Secure with: Tape. Frequency:Twice daily.   Signs and symptoms of infection taught.  Patient needs to be seen prn. No treatment and appointment scheduled to see MD. Dr. Azul was available for supervision of care if needed or if questions should arise and regarding plan of care. Karena Guzman RN, CWON

## 2018-02-23 NOTE — PATIENT INSTRUCTIONS
Return to the Surgery Clinic on an as needed basis. If your wound hasn't closed in 8 weeks, please contact us.  Please call [phone], option #3, with questions.

## 2018-02-23 NOTE — MR AVS SNAPSHOT
After Visit Summary   2/23/2018    Yuliana Licona    MRN: [id number]           Patient Information     Date Of Birth          1990        Visit Information        Provider Department      2/23/2018 12:30 PM Karena Guzman RN [facility] Wound Ostomy        Today's Diagnoses     Open abdominal wall wound, initial encounter    -  1       Follow-ups after your visit        Who to contact     Please call your clinic at [phone] to:    Ask questions about your health    Make or cancel appointments    Discuss your medicines    Learn about your test results    Speak to your doctor            Additional Information About Your Visit        Care EveryWhere ID     This is your Care EveryWhere ID. This could be used by other organizations to access your [place] medical records  [id number]         Blood Pressure from Last 3 Encounters:   02/23/18 117/82   02/07/18 104/68   01/24/18 122/76    Weight from Last 3 Encounters:   02/23/18 62.8 kg (138 lb 6.4 oz)   02/03/18 63.2 kg (139 lb 4.8 oz)   01/22/18 70.1 kg (154 lb 8 oz)              Today, you had the following     No orders found for display         Today's Medication Changes          These changes are accurate as of 2/23/18 12:38 PM.  If you have any questions, ask your nurse or doctor.               Stop taking these medicines if you haven't already. Please contact your care team if you have questions.     acetaminophen 325 MG tablet   Commonly known as:  TYLENOL   Stopped by:  Juan F Azul MD           cetirizine HCl 10 MG Caps   Stopped by:  Juan F Azul MD           fexofenadine 180 MG tablet   Commonly known as:  ALLEGRA ALLERGY   Stopped by:  Juan F Azul MD           oxyCODONE IR 5 MG tablet   Commonly known as:  ROXICODONE   Stopped by:  Juan F Azul MD           polyethylene glycol Packet   Commonly known as:  MIRALAX/GLYCOLAX   Stopped by:  Juan F Azul MD                     Primary Care Provider Fax #    Physician No Ref-Primary [phone]       No address on file        Equal Access to Services     ADDI MEDINA : Hadii aad ku hadjmselene Cristinaraghavendra, jose elias carlyishha, dunia linda delonzeinab, gianluca shayin hayaasaniya jerniganreggie rene lamattsaniya george. So [facility] [phone].    ATENCIÓN: Si habla español, tiene a campbell disposición servicios gratuitos de asistencia lingüística. Llame al [phone].    We comply with applicable federal civil rights laws and [place] laws. We do not discriminate on the basis of race, color, national origin, age, disability, sex, sexual orientation, or gender identity.            Thank you!     Thank you for choosing [facility]  for your care. Our goal is always to provide you with excellent care. Hearing back from our patients is one way we can continue to improve our services. Please take a few minutes to complete the written survey that you may receive in the mail after your visit with us. Thank you!             Your Updated Medication List - Protect others around you: Learn how to safely use, store and throw away your medicines at www.disposemymeds.org.      Notice  As of 2/23/2018 12:38 PM    You have not been prescribed any medications.

## 2018-02-23 NOTE — MR AVS SNAPSHOT
"              After Visit Summary   2/23/2018    Yuliana Licona    MRN: [id number]           Patient Information     Date Of Birth          1990        Visit Information        Provider Department      2/23/2018 11:30 AM Juan F Azul MD [facility] General Surgery        Care Instructions    Return to the Surgery Clinic on an as needed basis. If your wound hasn't closed in 8 weeks, please contact us.  Please call [phone], option #3, with questions.            Follow-ups after your visit        Your next 10 appointments already scheduled     Feb 23, 2018 12:30 PM CST   NEW WOUND NURSE VISIT with Karena Guzman RN   [facility] Wound Ostomy ([facility])    [address]  4th Floor  [facility] [address] [phone]              Who to contact     Please call your clinic at [phone] to:    Ask questions about your health    Make or cancel appointments    Discuss your medicines    Learn about your test results    Speak to your doctor            Additional Information About Your Visit        Care EveryWhere ID     This is your Care EveryWhere ID. This could be used by other organizations to access your [place] medical records  [id number]        Your Vitals Were     Pulse Temperature Height Pulse Oximetry BMI (Body Mass Index)       106 97.6  F (36.4  C) (Oral) 1.727 m (5' 8\") 100% 21.04 kg/m2        Blood Pressure from Last 3 Encounters:   02/23/18 117/82   02/07/18 104/68   01/24/18 122/76    Weight from Last 3 Encounters:   02/23/18 62.8 kg (138 lb 6.4 oz)   02/03/18 63.2 kg (139 lb 4.8 oz)   01/22/18 70.1 kg (154 lb 8 oz)              Today, you had the following     No orders found for display         Today's Medication Changes          These changes are accurate as of 2/23/18 11:53 AM.  If you have any questions, ask your nurse or doctor.               Stop taking these medicines if you haven't already. Please contact your care team if you have " questions.     acetaminophen 325 MG tablet   Commonly known as:  TYLENOL   Stopped by:  Juan F Azul MD           cetirizine HCl 10 MG Caps   Stopped by:  Juan F Azul MD           fexofenadine 180 MG tablet   Commonly known as:  ALLEGRA ALLERGY   Stopped by:  Juan F Azul MD           oxyCODONE IR 5 MG tablet   Commonly known as:  ROXICODONE   Stopped by:  Juan F Azul MD           polyethylene glycol Packet   Commonly known as:  MIRALAX/GLYCOLAX   Stopped by:  Juan F Azul MD                    Primary Care Provider Fax #    Physician No Ref-Primary [phone]       No address on file        Equal Access to Services     [facility]: Hadii gabrielle christyo Soraghavendra, waaxda luqadaha, qaybta kaalmada adeegyada, gianluca olivera . So [facility] [phone].    ATENCIÓN: Si habla español, tiene a campbell disposición servicios gratuitos de asistencia lingüística. Llame al [phone].    We comply with applicable federal civil rights laws and [place] laws. We do not discriminate on the basis of race, color, national origin, age, disability, sex, sexual orientation, or gender identity.            Thank you!     Thank you for choosing [facility]  for your care. Our goal is always to provide you with excellent care. Hearing back from our patients is one way we can continue to improve our services. Please take a few minutes to complete the written survey that you may receive in the mail after your visit with us. Thank you!             Your Updated Medication List - Protect others around you: Learn how to safely use, store and throw away your medicines at www.disposemymeds.org.      Notice  As of 2/23/2018 11:53 AM    You have not been prescribed any medications.

## 2018-02-23 NOTE — LETTER
2/23/2018       RE: Yuliana Licona  [address]   [facility] [address]     Dear Colleague,    Thank you for referring your patient, Yuliana Licona, to the [facility] GENERAL SURGERY at [facility]. Please see a copy of my visit note below.    Here for f/u and wound check.  Doing well.  Tolerating diet.  No abdominal pain.  Off abx for 5 days now.    On exam, her abdomen is soft, nondistended.  Wound is smaller, with no sign of active infection, healthy granulation tissue.  A/p - doing well after open surgery for ruptured appendicitis.  Anticipate normal recovery from here on.  However if any symptoms come back, she should have a small threshold to get in touch to have that evaluated.    Again, thank you for allowing me to participate in the care of your patient.      Sincerely,    Juan F Azul MD

## 2018-02-26 ENCOUNTER — CARE COORDINATION (OUTPATIENT)
Dept: SURGERY | Facility: CLINIC | Age: 28
End: 2018-02-26

## 2018-02-26 NOTE — PROGRESS NOTES
Received a call from Amando from [facility] regarding new orders for patient. Patient was seen in clinic Friday, 2/23 by Dr. Azul and wound care. Patients wvac was discontinued and she is to do dressing changes BID. She is able to do these herself. Patient can continue with home wound care prn. Provided VO to Amando regarding this information. She will call with any further questions. Number provided.

## 2018-02-28 ENCOUNTER — CARE COORDINATION (OUTPATIENT)
Dept: SURGERY | Facility: CLINIC | Age: 28
End: 2018-02-28

## 2018-02-28 NOTE — PROGRESS NOTES
Patient had an exploratory laparotomy 1/18 with Dr. Guthrie. She recently had her wound vac removed in clinic 2/23. She is doing dressing changes BID and feels the incision is healing well. She is requesting a letter to return to work. She wants to know if she should be on light duty or if she's ok to start her normal job duties (she is a ). She would like the letter emailed to her address on file. Discussed with patient that RN will speak with Dr. Azul for his recommendations and then send her the letter. She is in agreement of the plan.

## 2018-02-28 NOTE — LETTER
[facility] GENERAL SURGERY  [address]  4th Floor  [facility] [address]          February 28, 2018    RE:  Yuliana Licona                                                                                                                                                       [address]   [facility] [address]            To whom it may concern:    Yuliana Licona is under the professional care of Dr. Juan F Azul following her emergency surgery and during her recovery period.    When the patient returns to work she does have the following restrictions and they apply until  3/18/18. If she has incisional pain or discomfort once she is off restrictions, she will require one more month of light duty.     Restrictions: yes: no lifting, pushing, or pulling in excess of 15 lbs.    With any questions or concerns, please contact the General Surgery Clinic at [phone], option 3.    Sincerely,        Dr. Juan F Azul

## 2018-02-28 NOTE — PROGRESS NOTES
Spoke with Dr. Azul regarding patients work restrictions. He states that patient should be on light duty for 2 months following surgery 1/18. That would place patients work restrictions from 1/18-3/18. She may return to work without restrictions starting 3/19. If she has incisional pain or discomfort once she returns to work, she will require one more month of light duty. This would be in effect until 4/18. Letter has been emailed to patient per her request.

## 2018-03-06 NOTE — PROGRESS NOTES
Here for f/u and wound check.  Doing well.  Tolerating diet.  No abdominal pain.  Off abx for 5 days now.    On exam, her abdomen is soft, nondistended.  Wound is smaller, with no sign of active infection, healthy granulation tissue.  A/p - doing well after open surgery for ruptured appendicitis.  Anticipate normal recovery from here on.  However if any symptoms come back, she should have a small threshold to get in touch to have that evaluated.

## 2019-01-15 NOTE — PLAN OF CARE
Problem: Patient Care Overview  Goal: Plan of Care/Patient Progress Review  A&Ox4. AVSS. Pt slept until 1400, woken a few times for medications. Pt states she usually sleeps from 0300 until after 1200 due to working late at night. Pt denies pain and nausea. Tolerating regular diet. Up independently in room. Voiding good amounts. No BM this shift, + flatus. Wound vac to midline wound, intact, scant drainage, at 100 mmHg continuous. Per previous RN, the WOC RN was only able to set the wound vac to 75 mmHg d/t pt intolerance of higher pressure. However, wound vac settings had somehow changed prior to my shift, pt tolerating 100 mmHg okay, WOC RN called and confirmed that 100mmHg is okay. Pt switched from IV abx to PO abx. Possible DC tomorrow. Continue POC.       [Patient] : patient

## 2020-09-05 ENCOUNTER — ANESTHESIA EVENT (OUTPATIENT)
Dept: SURGERY | Facility: CLINIC | Age: 30
DRG: 743 | End: 2020-09-05

## 2020-09-05 ENCOUNTER — ANESTHESIA (OUTPATIENT)
Dept: SURGERY | Facility: CLINIC | Age: 30
DRG: 743 | End: 2020-09-05

## 2020-09-05 ENCOUNTER — HOSPITAL ENCOUNTER (INPATIENT)
Facility: CLINIC | Age: 30
LOS: 1 days | Discharge: HOME | DRG: 743 | End: 2020-09-07
Attending: STUDENT IN AN ORGANIZED HEALTH CARE EDUCATION/TRAINING PROGRAM | Admitting: OBSTETRICS & GYNECOLOGY

## 2020-09-05 ENCOUNTER — APPOINTMENT (OUTPATIENT)
Dept: CT IMAGING | Facility: CLINIC | Age: 30
DRG: 743 | End: 2020-09-05
Attending: STUDENT IN AN ORGANIZED HEALTH CARE EDUCATION/TRAINING PROGRAM

## 2020-09-05 DIAGNOSIS — Z20.822 COVID-19 RULED OUT BY LABORATORY TESTING: ICD-10-CM

## 2020-09-05 DIAGNOSIS — N83.519 OVARIAN TORSION: Primary | ICD-10-CM

## 2020-09-05 DIAGNOSIS — Z98.890 POSTOPERATIVE STATE: ICD-10-CM

## 2020-09-05 DIAGNOSIS — R11.0 NAUSEA: ICD-10-CM

## 2020-09-05 DIAGNOSIS — N83.511 TORSION OF RIGHT OVARY: ICD-10-CM

## 2020-09-05 LAB
ALBUMIN SERPL-MCNC: 4 G/DL (ref 3.4–5)
ALBUMIN UR-MCNC: NEGATIVE MG/DL
ALP SERPL-CCNC: 77 U/L (ref 40–150)
ALT SERPL W P-5'-P-CCNC: 27 U/L (ref 0–50)
ANION GAP SERPL CALCULATED.3IONS-SCNC: 14 MMOL/L (ref 3–14)
APPEARANCE UR: CLEAR
AST SERPL W P-5'-P-CCNC: 43 U/L (ref 0–45)
BASOPHILS # BLD AUTO: 0 10E9/L (ref 0–0.2)
BASOPHILS NFR BLD AUTO: 0.4 %
BILIRUB SERPL-MCNC: 0.8 MG/DL (ref 0.2–1.3)
BILIRUB UR QL STRIP: NEGATIVE
BUN SERPL-MCNC: 5 MG/DL (ref 7–30)
CALCIUM SERPL-MCNC: 8.8 MG/DL (ref 8.5–10.1)
CHLORIDE SERPL-SCNC: 105 MMOL/L (ref 94–109)
CO2 SERPL-SCNC: 19 MMOL/L (ref 20–32)
COLOR UR AUTO: ABNORMAL
CREAT SERPL-MCNC: 0.52 MG/DL (ref 0.52–1.04)
DIFFERENTIAL METHOD BLD: ABNORMAL
EOSINOPHIL # BLD AUTO: 0 10E9/L (ref 0–0.7)
EOSINOPHIL NFR BLD AUTO: 0 %
ERYTHROCYTE [DISTWIDTH] IN BLOOD BY AUTOMATED COUNT: 15.2 % (ref 10–15)
GFR SERPL CREATININE-BSD FRML MDRD: >90 ML/MIN/{1.73_M2}
GLUCOSE SERPL-MCNC: 101 MG/DL (ref 70–99)
GLUCOSE UR STRIP-MCNC: NEGATIVE MG/DL
HCG SERPL QL: NEGATIVE
HCT VFR BLD AUTO: 37.6 % (ref 35–47)
HGB BLD-MCNC: 12.9 G/DL (ref 11.7–15.7)
HGB UR QL STRIP: NEGATIVE
IMM GRANULOCYTES # BLD: 0 10E9/L (ref 0–0.4)
IMM GRANULOCYTES NFR BLD: 0.4 %
INR PPP: 1.32 (ref 0.86–1.14)
KETONES UR STRIP-MCNC: >150 MG/DL
LACTATE BLD-SCNC: 1.2 MMOL/L (ref 0.7–2)
LACTATE BLD-SCNC: 5.1 MMOL/L (ref 0.7–2)
LEUKOCYTE ESTERASE UR QL STRIP: ABNORMAL
LYMPHOCYTES # BLD AUTO: 1 10E9/L (ref 0.8–5.3)
LYMPHOCYTES NFR BLD AUTO: 11.9 %
MCH RBC QN AUTO: 36.1 PG (ref 26.5–33)
MCHC RBC AUTO-ENTMCNC: 34.3 G/DL (ref 31.5–36.5)
MCV RBC AUTO: 105 FL (ref 78–100)
MONOCYTES # BLD AUTO: 0.4 10E9/L (ref 0–1.3)
MONOCYTES NFR BLD AUTO: 4.5 %
MUCOUS THREADS #/AREA URNS LPF: PRESENT /LPF
NEUTROPHILS # BLD AUTO: 7 10E9/L (ref 1.6–8.3)
NEUTROPHILS NFR BLD AUTO: 82.8 %
NITRATE UR QL: NEGATIVE
NRBC # BLD AUTO: 0 10*3/UL
NRBC BLD AUTO-RTO: 0 /100
PH UR STRIP: 6 PH (ref 5–7)
PLATELET # BLD AUTO: 217 10E9/L (ref 150–450)
POTASSIUM SERPL-SCNC: 3.7 MMOL/L (ref 3.4–5.3)
PROT SERPL-MCNC: 7.4 G/DL (ref 6.8–8.8)
RADIOLOGIST FLAGS: ABNORMAL
RBC # BLD AUTO: 3.57 10E12/L (ref 3.8–5.2)
RBC #/AREA URNS AUTO: 1 /HPF (ref 0–2)
SODIUM SERPL-SCNC: 138 MMOL/L (ref 133–144)
SOURCE: ABNORMAL
SP GR UR STRIP: 1.02 (ref 1–1.03)
SQUAMOUS #/AREA URNS AUTO: 1 /HPF (ref 0–1)
UROBILINOGEN UR STRIP-MCNC: NORMAL MG/DL (ref 0–2)
WBC # BLD AUTO: 8.5 10E9/L (ref 4–11)
WBC #/AREA URNS AUTO: 14 /HPF (ref 0–5)

## 2020-09-05 PROCEDURE — 74177 CT ABD & PELVIS W/CONTRAST: CPT

## 2020-09-05 PROCEDURE — 85025 COMPLETE CBC W/AUTO DIFF WBC: CPT | Performed by: EMERGENCY MEDICINE

## 2020-09-05 PROCEDURE — 85610 PROTHROMBIN TIME: CPT | Performed by: STUDENT IN AN ORGANIZED HEALTH CARE EDUCATION/TRAINING PROGRAM

## 2020-09-05 PROCEDURE — U0003 INFECTIOUS AGENT DETECTION BY NUCLEIC ACID (DNA OR RNA); SEVERE ACUTE RESPIRATORY SYNDROME CORONAVIRUS 2 (SARS-COV-2) (CORONAVIRUS DISEASE [COVID-19]), AMPLIFIED PROBE TECHNIQUE, MAKING USE OF HIGH THROUGHPUT TECHNOLOGIES AS DESCRIBED BY CMS-2020-01-R: HCPCS | Performed by: EMERGENCY MEDICINE

## 2020-09-05 PROCEDURE — 96376 TX/PRO/DX INJ SAME DRUG ADON: CPT | Performed by: STUDENT IN AN ORGANIZED HEALTH CARE EDUCATION/TRAINING PROGRAM

## 2020-09-05 PROCEDURE — 84703 CHORIONIC GONADOTROPIN ASSAY: CPT | Performed by: EMERGENCY MEDICINE

## 2020-09-05 PROCEDURE — 86850 RBC ANTIBODY SCREEN: CPT | Performed by: STUDENT IN AN ORGANIZED HEALTH CARE EDUCATION/TRAINING PROGRAM

## 2020-09-05 PROCEDURE — 96375 TX/PRO/DX INJ NEW DRUG ADDON: CPT | Performed by: STUDENT IN AN ORGANIZED HEALTH CARE EDUCATION/TRAINING PROGRAM

## 2020-09-05 PROCEDURE — 96374 THER/PROPH/DIAG INJ IV PUSH: CPT | Mod: 59 | Performed by: STUDENT IN AN ORGANIZED HEALTH CARE EDUCATION/TRAINING PROGRAM

## 2020-09-05 PROCEDURE — 25000128 H RX IP 250 OP 636

## 2020-09-05 PROCEDURE — 99285 EMERGENCY DEPT VISIT HI MDM: CPT | Mod: 25 | Performed by: STUDENT IN AN ORGANIZED HEALTH CARE EDUCATION/TRAINING PROGRAM

## 2020-09-05 PROCEDURE — 25000128 H RX IP 250 OP 636: Performed by: STUDENT IN AN ORGANIZED HEALTH CARE EDUCATION/TRAINING PROGRAM

## 2020-09-05 PROCEDURE — 25800030 ZZH RX IP 258 OP 636: Performed by: STUDENT IN AN ORGANIZED HEALTH CARE EDUCATION/TRAINING PROGRAM

## 2020-09-05 PROCEDURE — 83605 ASSAY OF LACTIC ACID: CPT | Performed by: STUDENT IN AN ORGANIZED HEALTH CARE EDUCATION/TRAINING PROGRAM

## 2020-09-05 PROCEDURE — C9803 HOPD COVID-19 SPEC COLLECT: HCPCS | Performed by: STUDENT IN AN ORGANIZED HEALTH CARE EDUCATION/TRAINING PROGRAM

## 2020-09-05 PROCEDURE — 81001 URINALYSIS AUTO W/SCOPE: CPT | Performed by: EMERGENCY MEDICINE

## 2020-09-05 PROCEDURE — 96361 HYDRATE IV INFUSION ADD-ON: CPT | Performed by: STUDENT IN AN ORGANIZED HEALTH CARE EDUCATION/TRAINING PROGRAM

## 2020-09-05 PROCEDURE — 80053 COMPREHEN METABOLIC PANEL: CPT | Performed by: EMERGENCY MEDICINE

## 2020-09-05 PROCEDURE — 83605 ASSAY OF LACTIC ACID: CPT | Performed by: EMERGENCY MEDICINE

## 2020-09-05 PROCEDURE — 86901 BLOOD TYPING SEROLOGIC RH(D): CPT | Performed by: STUDENT IN AN ORGANIZED HEALTH CARE EDUCATION/TRAINING PROGRAM

## 2020-09-05 PROCEDURE — 85610 PROTHROMBIN TIME: CPT | Performed by: EMERGENCY MEDICINE

## 2020-09-05 PROCEDURE — 99285 EMERGENCY DEPT VISIT HI MDM: CPT | Mod: Z6 | Performed by: STUDENT IN AN ORGANIZED HEALTH CARE EDUCATION/TRAINING PROGRAM

## 2020-09-05 PROCEDURE — 86900 BLOOD TYPING SEROLOGIC ABO: CPT | Performed by: STUDENT IN AN ORGANIZED HEALTH CARE EDUCATION/TRAINING PROGRAM

## 2020-09-05 PROCEDURE — 87086 URINE CULTURE/COLONY COUNT: CPT | Performed by: STUDENT IN AN ORGANIZED HEALTH CARE EDUCATION/TRAINING PROGRAM

## 2020-09-05 RX ORDER — ONDANSETRON 2 MG/ML
4 INJECTION INTRAMUSCULAR; INTRAVENOUS ONCE
Status: COMPLETED | OUTPATIENT
Start: 2020-09-05 | End: 2020-09-05

## 2020-09-05 RX ORDER — IOPAMIDOL 755 MG/ML
95 INJECTION, SOLUTION INTRAVASCULAR ONCE
Status: COMPLETED | OUTPATIENT
Start: 2020-09-05 | End: 2020-09-05

## 2020-09-05 RX ORDER — ONDANSETRON 2 MG/ML
4 INJECTION INTRAMUSCULAR; INTRAVENOUS EVERY 30 MIN PRN
Status: COMPLETED | OUTPATIENT
Start: 2020-09-05 | End: 2020-09-05

## 2020-09-05 RX ORDER — HYDROMORPHONE HYDROCHLORIDE 1 MG/ML
INJECTION, SOLUTION INTRAMUSCULAR; INTRAVENOUS; SUBCUTANEOUS
Status: COMPLETED
Start: 2020-09-05 | End: 2020-09-05

## 2020-09-05 RX ORDER — HYDROMORPHONE HYDROCHLORIDE 1 MG/ML
0.5 INJECTION, SOLUTION INTRAMUSCULAR; INTRAVENOUS; SUBCUTANEOUS ONCE
Status: COMPLETED | OUTPATIENT
Start: 2020-09-05 | End: 2020-09-05

## 2020-09-05 RX ORDER — HYDROMORPHONE HYDROCHLORIDE 1 MG/ML
INJECTION, SOLUTION INTRAMUSCULAR; INTRAVENOUS; SUBCUTANEOUS
Status: DISCONTINUED
Start: 2020-09-05 | End: 2020-09-05 | Stop reason: HOSPADM

## 2020-09-05 RX ORDER — HYDROMORPHONE HYDROCHLORIDE 1 MG/ML
0.5 INJECTION, SOLUTION INTRAMUSCULAR; INTRAVENOUS; SUBCUTANEOUS
Status: COMPLETED | OUTPATIENT
Start: 2020-09-05 | End: 2020-09-05

## 2020-09-05 RX ADMIN — HYDROMORPHONE HYDROCHLORIDE 0.5 MG: 1 INJECTION, SOLUTION INTRAMUSCULAR; INTRAVENOUS; SUBCUTANEOUS at 21:18

## 2020-09-05 RX ADMIN — IOPAMIDOL 95 ML: 755 INJECTION, SOLUTION INTRAVENOUS at 21:39

## 2020-09-05 RX ADMIN — SODIUM CHLORIDE 1000 ML: 9 INJECTION, SOLUTION INTRAVENOUS at 20:00

## 2020-09-05 RX ADMIN — HYDROMORPHONE HYDROCHLORIDE 1 MG: 1 INJECTION, SOLUTION INTRAMUSCULAR; INTRAVENOUS; SUBCUTANEOUS at 22:15

## 2020-09-05 RX ADMIN — ONDANSETRON 4 MG: 2 INJECTION INTRAMUSCULAR; INTRAVENOUS at 23:30

## 2020-09-05 RX ADMIN — ONDANSETRON 4 MG: 2 INJECTION INTRAMUSCULAR; INTRAVENOUS at 22:15

## 2020-09-05 RX ADMIN — SODIUM CHLORIDE 1000 ML: 9 INJECTION, SOLUTION INTRAVENOUS at 21:21

## 2020-09-05 RX ADMIN — ONDANSETRON 4 MG: 2 INJECTION INTRAMUSCULAR; INTRAVENOUS at 21:18

## 2020-09-05 RX ADMIN — HYDROMORPHONE HYDROCHLORIDE 0.5 MG: 1 INJECTION, SOLUTION INTRAMUSCULAR; INTRAVENOUS; SUBCUTANEOUS at 23:30

## 2020-09-05 RX ADMIN — ONDANSETRON 4 MG: 2 INJECTION INTRAMUSCULAR; INTRAVENOUS at 20:00

## 2020-09-05 ASSESSMENT — ENCOUNTER SYMPTOMS
VOMITING: 1
ABDOMINAL PAIN: 1
NAUSEA: 1
BACK PAIN: 1
COUGH: 0
DIARRHEA: 1

## 2020-09-05 ASSESSMENT — MIFFLIN-ST. JEOR: SCORE: 1497.46

## 2020-09-06 PROBLEM — N83.519 OVARIAN TORSION: Status: ACTIVE | Noted: 2020-09-06

## 2020-09-06 LAB
ABO + RH BLD: NORMAL
ABO + RH BLD: NORMAL
BLD GP AB SCN SERPL QL: NORMAL
BLOOD BANK CMNT PATIENT-IMP: NORMAL
LABORATORY COMMENT REPORT: NORMAL
SARS-COV-2 RNA SPEC QL NAA+PROBE: NEGATIVE
SARS-COV-2 RNA SPEC QL NAA+PROBE: NORMAL
SPECIMEN EXP DATE BLD: NORMAL
SPECIMEN SOURCE: NORMAL
SPECIMEN SOURCE: NORMAL

## 2020-09-06 PROCEDURE — 25000565 ZZH ISOFLURANE, EA 15 MIN: Performed by: OBSTETRICS & GYNECOLOGY

## 2020-09-06 PROCEDURE — 36000057 ZZH SURGERY LEVEL 3 1ST 30 MIN - UMMC: Performed by: OBSTETRICS & GYNECOLOGY

## 2020-09-06 PROCEDURE — 25800030 ZZH RX IP 258 OP 636: Performed by: NURSE ANESTHETIST, CERTIFIED REGISTERED

## 2020-09-06 PROCEDURE — 25000128 H RX IP 250 OP 636: Performed by: OBSTETRICS & GYNECOLOGY

## 2020-09-06 PROCEDURE — 25000128 H RX IP 250 OP 636: Performed by: NURSE ANESTHETIST, CERTIFIED REGISTERED

## 2020-09-06 PROCEDURE — 25000125 ZZHC RX 250: Performed by: NURSE ANESTHETIST, CERTIFIED REGISTERED

## 2020-09-06 PROCEDURE — 37000008 ZZH ANESTHESIA TECHNICAL FEE, 1ST 30 MIN: Performed by: OBSTETRICS & GYNECOLOGY

## 2020-09-06 PROCEDURE — 40000170 ZZH STATISTIC PRE-PROCEDURE ASSESSMENT II: Performed by: OBSTETRICS & GYNECOLOGY

## 2020-09-06 PROCEDURE — 12000001 ZZH R&B MED SURG/OB UMMC

## 2020-09-06 PROCEDURE — 0UT00ZZ RESECTION OF RIGHT OVARY, OPEN APPROACH: ICD-10-PCS | Performed by: OBSTETRICS & GYNECOLOGY

## 2020-09-06 PROCEDURE — 88305 TISSUE EXAM BY PATHOLOGIST: CPT | Performed by: OBSTETRICS & GYNECOLOGY

## 2020-09-06 PROCEDURE — C1765 ADHESION BARRIER: HCPCS | Performed by: OBSTETRICS & GYNECOLOGY

## 2020-09-06 PROCEDURE — 37000009 ZZH ANESTHESIA TECHNICAL FEE, EACH ADDTL 15 MIN: Performed by: OBSTETRICS & GYNECOLOGY

## 2020-09-06 PROCEDURE — 36000059 ZZH SURGERY LEVEL 3 EA 15 ADDTL MIN UMMC: Performed by: OBSTETRICS & GYNECOLOGY

## 2020-09-06 PROCEDURE — 86900 BLOOD TYPING SEROLOGIC ABO: CPT | Performed by: OBSTETRICS & GYNECOLOGY

## 2020-09-06 PROCEDURE — 71000014 ZZH RECOVERY PHASE 1 LEVEL 2 FIRST HR: Performed by: OBSTETRICS & GYNECOLOGY

## 2020-09-06 PROCEDURE — 25000128 H RX IP 250 OP 636: Performed by: STUDENT IN AN ORGANIZED HEALTH CARE EDUCATION/TRAINING PROGRAM

## 2020-09-06 PROCEDURE — 0UT50ZZ RESECTION OF RIGHT FALLOPIAN TUBE, OPEN APPROACH: ICD-10-PCS | Performed by: OBSTETRICS & GYNECOLOGY

## 2020-09-06 PROCEDURE — 25000132 ZZH RX MED GY IP 250 OP 250 PS 637: Performed by: STUDENT IN AN ORGANIZED HEALTH CARE EDUCATION/TRAINING PROGRAM

## 2020-09-06 PROCEDURE — 27210794 ZZH OR GENERAL SUPPLY STERILE: Performed by: OBSTETRICS & GYNECOLOGY

## 2020-09-06 RX ORDER — METOCLOPRAMIDE 10 MG/1
10 TABLET ORAL EVERY 6 HOURS PRN
Status: DISCONTINUED | OUTPATIENT
Start: 2020-09-06 | End: 2020-09-07 | Stop reason: HOSPADM

## 2020-09-06 RX ORDER — SODIUM CHLORIDE, SODIUM LACTATE, POTASSIUM CHLORIDE, CALCIUM CHLORIDE 600; 310; 30; 20 MG/100ML; MG/100ML; MG/100ML; MG/100ML
INJECTION, SOLUTION INTRAVENOUS CONTINUOUS
Status: DISCONTINUED | OUTPATIENT
Start: 2020-09-06 | End: 2020-09-06 | Stop reason: HOSPADM

## 2020-09-06 RX ORDER — ONDANSETRON 4 MG/1
4 TABLET, ORALLY DISINTEGRATING ORAL EVERY 30 MIN PRN
Status: DISCONTINUED | OUTPATIENT
Start: 2020-09-06 | End: 2020-09-06 | Stop reason: HOSPADM

## 2020-09-06 RX ORDER — OXYCODONE HYDROCHLORIDE 5 MG/1
5-10 TABLET ORAL
Status: DISCONTINUED | OUTPATIENT
Start: 2020-09-06 | End: 2020-09-07 | Stop reason: HOSPADM

## 2020-09-06 RX ORDER — LABETALOL HYDROCHLORIDE 5 MG/ML
10 INJECTION, SOLUTION INTRAVENOUS
Status: DISCONTINUED | OUTPATIENT
Start: 2020-09-06 | End: 2020-09-06 | Stop reason: HOSPADM

## 2020-09-06 RX ORDER — DIPHENHYDRAMINE HCL 25 MG
25-50 CAPSULE ORAL EVERY 6 HOURS PRN
Status: DISCONTINUED | OUTPATIENT
Start: 2020-09-06 | End: 2020-09-07 | Stop reason: HOSPADM

## 2020-09-06 RX ORDER — METOCLOPRAMIDE HYDROCHLORIDE 5 MG/ML
10 INJECTION INTRAMUSCULAR; INTRAVENOUS EVERY 6 HOURS PRN
Status: DISCONTINUED | OUTPATIENT
Start: 2020-09-06 | End: 2020-09-07 | Stop reason: HOSPADM

## 2020-09-06 RX ORDER — ACETAMINOPHEN 325 MG/1
650 TABLET ORAL EVERY 4 HOURS PRN
Status: DISCONTINUED | OUTPATIENT
Start: 2020-09-09 | End: 2020-09-07 | Stop reason: HOSPADM

## 2020-09-06 RX ORDER — LIDOCAINE HYDROCHLORIDE 20 MG/ML
INJECTION, SOLUTION INFILTRATION; PERINEURAL PRN
Status: DISCONTINUED | OUTPATIENT
Start: 2020-09-06 | End: 2020-09-06

## 2020-09-06 RX ORDER — ACETAMINOPHEN 325 MG/1
975 TABLET ORAL EVERY 8 HOURS
Status: DISCONTINUED | OUTPATIENT
Start: 2020-09-06 | End: 2020-09-07 | Stop reason: HOSPADM

## 2020-09-06 RX ORDER — FENTANYL CITRATE 50 UG/ML
25-50 INJECTION, SOLUTION INTRAMUSCULAR; INTRAVENOUS
Status: DISCONTINUED | OUTPATIENT
Start: 2020-09-06 | End: 2020-09-06 | Stop reason: HOSPADM

## 2020-09-06 RX ORDER — PROPOFOL 10 MG/ML
INJECTION, EMULSION INTRAVENOUS PRN
Status: DISCONTINUED | OUTPATIENT
Start: 2020-09-06 | End: 2020-09-06

## 2020-09-06 RX ORDER — SODIUM CHLORIDE, SODIUM LACTATE, POTASSIUM CHLORIDE, CALCIUM CHLORIDE 600; 310; 30; 20 MG/100ML; MG/100ML; MG/100ML; MG/100ML
INJECTION, SOLUTION INTRAVENOUS CONTINUOUS PRN
Status: DISCONTINUED | OUTPATIENT
Start: 2020-09-06 | End: 2020-09-06

## 2020-09-06 RX ORDER — PROCHLORPERAZINE MALEATE 10 MG
10 TABLET ORAL EVERY 6 HOURS PRN
Status: DISCONTINUED | OUTPATIENT
Start: 2020-09-06 | End: 2020-09-07 | Stop reason: HOSPADM

## 2020-09-06 RX ORDER — LIDOCAINE 40 MG/G
CREAM TOPICAL
Status: DISCONTINUED | OUTPATIENT
Start: 2020-09-06 | End: 2020-09-07 | Stop reason: HOSPADM

## 2020-09-06 RX ORDER — IBUPROFEN 600 MG/1
600 TABLET, FILM COATED ORAL EVERY 6 HOURS PRN
Status: DISCONTINUED | OUTPATIENT
Start: 2020-09-06 | End: 2020-09-07 | Stop reason: HOSPADM

## 2020-09-06 RX ORDER — CEFAZOLIN SODIUM 1 G/3ML
INJECTION, POWDER, FOR SOLUTION INTRAMUSCULAR; INTRAVENOUS PRN
Status: DISCONTINUED | OUTPATIENT
Start: 2020-09-06 | End: 2020-09-06

## 2020-09-06 RX ORDER — ONDANSETRON 2 MG/ML
4 INJECTION INTRAMUSCULAR; INTRAVENOUS EVERY 30 MIN PRN
Status: DISCONTINUED | OUTPATIENT
Start: 2020-09-06 | End: 2020-09-06 | Stop reason: HOSPADM

## 2020-09-06 RX ORDER — BUPIVACAINE HYDROCHLORIDE 2.5 MG/ML
INJECTION, SOLUTION INFILTRATION; PERINEURAL PRN
Status: DISCONTINUED | OUTPATIENT
Start: 2020-09-06 | End: 2020-09-06 | Stop reason: HOSPADM

## 2020-09-06 RX ORDER — DEXAMETHASONE SODIUM PHOSPHATE 4 MG/ML
INJECTION, SOLUTION INTRA-ARTICULAR; INTRALESIONAL; INTRAMUSCULAR; INTRAVENOUS; SOFT TISSUE PRN
Status: DISCONTINUED | OUTPATIENT
Start: 2020-09-06 | End: 2020-09-06

## 2020-09-06 RX ORDER — NALOXONE HYDROCHLORIDE 0.4 MG/ML
.1-.4 INJECTION, SOLUTION INTRAMUSCULAR; INTRAVENOUS; SUBCUTANEOUS
Status: DISCONTINUED | OUTPATIENT
Start: 2020-09-06 | End: 2020-09-07 | Stop reason: HOSPADM

## 2020-09-06 RX ORDER — HYDROMORPHONE HYDROCHLORIDE 1 MG/ML
.3-.5 INJECTION, SOLUTION INTRAMUSCULAR; INTRAVENOUS; SUBCUTANEOUS EVERY 5 MIN PRN
Status: DISCONTINUED | OUTPATIENT
Start: 2020-09-06 | End: 2020-09-06 | Stop reason: HOSPADM

## 2020-09-06 RX ORDER — NALOXONE HYDROCHLORIDE 0.4 MG/ML
.1-.4 INJECTION, SOLUTION INTRAMUSCULAR; INTRAVENOUS; SUBCUTANEOUS
Status: DISCONTINUED | OUTPATIENT
Start: 2020-09-06 | End: 2020-09-06

## 2020-09-06 RX ORDER — AMOXICILLIN 250 MG
1 CAPSULE ORAL 2 TIMES DAILY
Status: DISCONTINUED | OUTPATIENT
Start: 2020-09-06 | End: 2020-09-07 | Stop reason: HOSPADM

## 2020-09-06 RX ORDER — FENTANYL CITRATE 50 UG/ML
INJECTION, SOLUTION INTRAMUSCULAR; INTRAVENOUS PRN
Status: DISCONTINUED | OUTPATIENT
Start: 2020-09-06 | End: 2020-09-06

## 2020-09-06 RX ORDER — ONDANSETRON 2 MG/ML
4 INJECTION INTRAMUSCULAR; INTRAVENOUS EVERY 6 HOURS PRN
Status: DISCONTINUED | OUTPATIENT
Start: 2020-09-06 | End: 2020-09-07 | Stop reason: HOSPADM

## 2020-09-06 RX ORDER — CEFAZOLIN SODIUM 2 G/100ML
INJECTION, SOLUTION INTRAVENOUS PRN
Status: DISCONTINUED | OUTPATIENT
Start: 2020-09-06 | End: 2020-09-06

## 2020-09-06 RX ORDER — AMOXICILLIN 250 MG
2 CAPSULE ORAL 2 TIMES DAILY
Status: DISCONTINUED | OUTPATIENT
Start: 2020-09-06 | End: 2020-09-07 | Stop reason: HOSPADM

## 2020-09-06 RX ORDER — ONDANSETRON 4 MG/1
4 TABLET, ORALLY DISINTEGRATING ORAL EVERY 6 HOURS PRN
Status: DISCONTINUED | OUTPATIENT
Start: 2020-09-06 | End: 2020-09-07 | Stop reason: HOSPADM

## 2020-09-06 RX ADMIN — IBUPROFEN 600 MG: 600 TABLET ORAL at 20:10

## 2020-09-06 RX ADMIN — FENTANYL CITRATE 50 MCG: 50 INJECTION, SOLUTION INTRAMUSCULAR; INTRAVENOUS at 01:18

## 2020-09-06 RX ADMIN — DOCUSATE SODIUM AND SENNOSIDES 2 TABLET: 8.6; 5 TABLET ORAL at 20:08

## 2020-09-06 RX ADMIN — PROPOFOL 150 MG: 10 INJECTION, EMULSION INTRAVENOUS at 01:00

## 2020-09-06 RX ADMIN — ACETAMINOPHEN 975 MG: 325 TABLET, FILM COATED ORAL at 11:58

## 2020-09-06 RX ADMIN — IBUPROFEN 600 MG: 600 TABLET ORAL at 11:58

## 2020-09-06 RX ADMIN — FENTANYL CITRATE 50 MCG: 50 INJECTION, SOLUTION INTRAMUSCULAR; INTRAVENOUS at 01:24

## 2020-09-06 RX ADMIN — ACETAMINOPHEN 975 MG: 325 TABLET, FILM COATED ORAL at 03:55

## 2020-09-06 RX ADMIN — DIPHENHYDRAMINE HYDROCHLORIDE 25 MG: 25 CAPSULE ORAL at 06:55

## 2020-09-06 RX ADMIN — SODIUM CHLORIDE, POTASSIUM CHLORIDE, SODIUM LACTATE AND CALCIUM CHLORIDE: 600; 310; 30; 20 INJECTION, SOLUTION INTRAVENOUS at 00:51

## 2020-09-06 RX ADMIN — FENTANYL CITRATE 50 MCG: 50 INJECTION, SOLUTION INTRAMUSCULAR; INTRAVENOUS at 02:19

## 2020-09-06 RX ADMIN — HYDROMORPHONE HYDROCHLORIDE 0.5 MG: 1 INJECTION, SOLUTION INTRAMUSCULAR; INTRAVENOUS; SUBCUTANEOUS at 02:51

## 2020-09-06 RX ADMIN — ROCURONIUM BROMIDE 25 MG: 10 INJECTION INTRAVENOUS at 01:14

## 2020-09-06 RX ADMIN — HYDROMORPHONE HYDROCHLORIDE 0.5 MG: 1 INJECTION, SOLUTION INTRAMUSCULAR; INTRAVENOUS; SUBCUTANEOUS at 02:38

## 2020-09-06 RX ADMIN — Medication 80 MG: at 01:00

## 2020-09-06 RX ADMIN — Medication 2 G: at 01:02

## 2020-09-06 RX ADMIN — OXYCODONE HYDROCHLORIDE 5 MG: 5 TABLET ORAL at 03:06

## 2020-09-06 RX ADMIN — IBUPROFEN 600 MG: 600 TABLET ORAL at 05:50

## 2020-09-06 RX ADMIN — SUGAMMADEX 150 MG: 100 INJECTION, SOLUTION INTRAVENOUS at 02:13

## 2020-09-06 RX ADMIN — FENTANYL CITRATE 50 MCG: 50 INJECTION, SOLUTION INTRAMUSCULAR; INTRAVENOUS at 01:15

## 2020-09-06 RX ADMIN — ACETAMINOPHEN 975 MG: 325 TABLET, FILM COATED ORAL at 20:08

## 2020-09-06 RX ADMIN — ROCURONIUM BROMIDE 10 MG: 10 INJECTION INTRAVENOUS at 01:30

## 2020-09-06 RX ADMIN — MIDAZOLAM 1 MG: 1 INJECTION INTRAMUSCULAR; INTRAVENOUS at 00:51

## 2020-09-06 RX ADMIN — HYDROMORPHONE HYDROCHLORIDE 0.25 MG: 1 INJECTION, SOLUTION INTRAMUSCULAR; INTRAVENOUS; SUBCUTANEOUS at 02:30

## 2020-09-06 RX ADMIN — LIDOCAINE HYDROCHLORIDE 100 MG: 20 INJECTION, SOLUTION INFILTRATION; PERINEURAL at 01:00

## 2020-09-06 RX ADMIN — DEXAMETHASONE SODIUM PHOSPHATE 6 MG: 4 INJECTION, SOLUTION INTRAMUSCULAR; INTRAVENOUS at 01:05

## 2020-09-06 RX ADMIN — HYDROMORPHONE HYDROCHLORIDE 0.25 MG: 1 INJECTION, SOLUTION INTRAMUSCULAR; INTRAVENOUS; SUBCUTANEOUS at 02:24

## 2020-09-06 ASSESSMENT — ACTIVITIES OF DAILY LIVING (ADL)
COGNITION: 0 - NO COGNITION ISSUES REPORTED
PRIOR_FUNCTIONAL_LEVEL_COMMENT: IND
SWALLOWING: 0-->SWALLOWS FOODS/LIQUIDS WITHOUT DIFFICULTY
BATHING: 0-->INDEPENDENT
FALL_HISTORY_WITHIN_LAST_SIX_MONTHS: NO
DRESS: 0-->INDEPENDENT
RETIRED_COMMUNICATION: 0-->UNDERSTANDS/COMMUNICATES WITHOUT DIFFICULTY
TOILETING: 0-->INDEPENDENT
AMBULATION: 0-->INDEPENDENT
TRANSFERRING: 0-->INDEPENDENT
RETIRED_EATING: 0-->INDEPENDENT

## 2020-09-06 NOTE — OR NURSING
"Pt meets discharge criteria for the floor- vitals stable, pain \" better\" pt drinking without nausea, dressing dry and intact - abd binder placed and warm packs on for comfort. Report given to RN plan for transport   "

## 2020-09-06 NOTE — PROGRESS NOTES
Gynecology Postoperative Check  9/6/2020    S:   Patient reports she is doing well postoperatively. Pain is well controlled with oral medications. Ambulating without dizziness. Nunes came out at 1300, not yet voiding spontaneously. Has had flatus, no BM. Tolerating regular diet without nausea or vomiting. Bleeding is minimal. Denies chest pain, shortness of breath, dizziness, or other concerns at this time.     O:  Vitals:    09/06/20 0530 09/06/20 0630 09/06/20 0723 09/06/20 1517   BP: 135/72 125/74 130/69 134/75   BP Location: Right arm Right arm Right arm Right arm   Pulse: 88 66 66 70   Resp: 20 18 16 14   Temp: 97.5  F (36.4  C)  97.4  F (36.3  C) 97.6  F (36.4  C)   TempSrc: Oral  Oral Oral   SpO2: 93% 93% 95% 98%   Weight:       Height:           Gen: NAD  Cardio: RRR, S1/S2, no murmurs  Resp: CTAB, no wheezing or crackles  Abdomen: soft, nontender, non distended, incision c/d/i  Extremities: Non-tender, no edema    A/P:   30 year old POD#0 s/p XL, RSO for torsion. Doing well postoperatively.     FEN: Advance as tolerated  Pain: Ibuprofen, tylenol, oxy PRN  GI: Stool softeners PRN  : Nunes out 1300, not voiding spontaneously yet    Dispo: Admitted    Ankur Kwan MD  Obstetrics, Gynecology, and Women's Health  PGY1

## 2020-09-06 NOTE — PLAN OF CARE
Pt arrived to the unit at 0330 via cart. Alert and oriented x4. Oriented to the room and the call light system.  Pt A&O x's 4. VSS. Afebrile. 02 sats in the 90 on RA.Lungs clear. Denies SOB, CP or nausea. Tolerating crackers. Juice/water. Bowel sound active in all quadrants. No BM, has not passed gas. Nunes patent and darning without any issues.Pain is being managed with scheduled tylenol and prn ibuprofen. CMS intact, denies N/T. PIV patent and SL.Pt slept between care and is able to make needs known, call light with in reach. Will continue to monitor.   Pt complained of itchiness, OB updated, stated that will order Benadryl.

## 2020-09-06 NOTE — H&P
Gynecology History and Physical      HPI: 30 year old  with PMH notable for ruptured appendicitis, complicated by postoperative abscess, presenting with abdominal pain.    Her pain started this morning around 1130. It was present when she woke up. It is constant, but waxes and wanes in intensity. She describes it as sharp, intense pain predominantly in the right lower aspect of her abdomen. She feels as if she is being stabbed. She endorses nausea and single episode of emesis after attempting to drink a large amount of water. Reports lack of appetite today.    She denies vaginal bleeding, discharge, dysuria. She endorses increased urinary frequency.     ED course: Upon arrival, her vitals were notable for hypertensive and tachycardia. Laboratory workup with a hemoglobin of 12.9, WBC 8.5, lactic acid of 5.1, UA with ketones and moderate leukocyte esterase. She received Zofran and Dilaudid for symptom relief. A CT was obtained with results concerning for ovarian torsion at which time gynecology was notified.     Gynecologic history: 1 prior TAB, no know history of STI. Not currently using contraception. Regular menses.    ROS: negative other than noted in HPI.    PMH:   - intra abdominal abscess postoperatively following appendectomy    PSHx:   Past Surgical History:   Procedure Laterality Date     APPENDECTOMY           LAPAROTOMY EXPLORATORY N/A 2018    Procedure: LAPAROTOMY EXPLORATORY;  Exploratory Laparotomy, Appendectomy;  Surgeon: Brianna Guthrie MD;  Location:  OR     Medications:   Current Facility-Administered Medications   Medication     HYDROmorphone (PF) (DILAUDID) injection 0.5 mg     ondansetron (ZOFRAN) injection 4 mg     No current outpatient medications on file.     No current facility-administered medications on file prior to encounter.   No current outpatient medications on file prior to encounter.    Allergies:    No Known Allergies    Social History:   Social History      Socioeconomic History     Marital status: Single     Spouse name: Not on file     Number of children: Not on file     Years of education: Not on file     Highest education level: Not on file   Occupational History     Not on file   Social Needs     Financial resource strain: Not on file     Food insecurity     Worry: Not on file     Inability: Not on file     Transportation needs     Medical: Not on file     Non-medical: Not on file   Tobacco Use     Smoking status: Former Smoker     Smokeless tobacco: Never Used   Substance and Sexual Activity     Alcohol use: Yes     Comment: 3 drinks/day     Drug use: Yes     Types: Marijuana, Cocaine     Comment: reports regular marijuana use and rare cocaine use     Sexual activity: Yes     Birth control/protection: Condom   Lifestyle     Physical activity     Days per week: Not on file     Minutes per session: Not on file     Stress: Not on file   Relationships     Social connections     Talks on phone: Not on file     Gets together: Not on file     Attends Yazidism service: Not on file     Active member of club or organization: Not on file     Attends meetings of clubs or organizations: Not on file     Relationship status: Not on file     Intimate partner violence     Fear of current or ex partner: Not on file     Emotionally abused: Not on file     Physically abused: Not on file     Forced sexual activity: Not on file   Other Topics Concern     Parent/sibling w/ CABG, MI or angioplasty before 65F 55M? Not Asked   Social History Narrative     Not on file     Social History     Socioeconomic History     Marital status: Single     Spouse name: None     Number of children: None     Years of education: None     Highest education level: None   Occupational History     None   Social Needs     Financial resource strain: None     Food insecurity     Worry: None     Inability: None     Transportation needs     Medical: None     Non-medical: None   Tobacco Use     Smoking status:  Former Smoker     Smokeless tobacco: Never Used   Substance and Sexual Activity     Alcohol use: Yes     Comment: 3 drinks/day     Drug use: Yes     Types: Marijuana, Cocaine     Comment: reports regular marijuana use and rare cocaine use     Sexual activity: Yes     Birth control/protection: Condom   Lifestyle     Physical activity     Days per week: None     Minutes per session: None     Stress: None   Relationships     Social connections     Talks on phone: None     Gets together: None     Attends Adventism service: None     Active member of club or organization: None     Attends meetings of clubs or organizations: None     Relationship status: None     Intimate partner violence     Fear of current or ex partner: None     Emotionally abused: None     Physically abused: None     Forced sexual activity: None   Other Topics Concern     Parent/sibling w/ CABG, MI or angioplasty before 65F 55M? Not Asked   Social History Narrative     None     Physical Exam:   Vitals:    09/05/20 2200 09/05/20 2201 09/05/20 2217 09/05/20 2218   BP: (!) 135/93      Pulse: 119      Resp:       Temp:       TempSrc:       SpO2: 97% 98% 98% 96%   Weight:       Height:          Gen:  HEENT: Normocephalic and atraumatic  CV: RRR, no murmurs  Pulm: CTAB, no increased work of breathing  Abd: tenderness to palpation in right lower quadrant and mid lower abdomen, voluntary guarding, no rebound, non-distended  Extremities: no edema    Labs:  WBC 8.5  Hemoglobin 12.9  Lactic acid 5.1>>1.2  Beta HCG negative    Imaging:  EXAMINATION: CT ABDOMEN PELVIS W CONTRAST, 9/5/2020 9:50 PM  TECHNIQUE:  Helical CT images from the lung bases through the symphysis pubis were obtained  with IV contrast. Contrast dose: Isovue 370  COMPARISON: 1/18/2018  HISTORY: prior ruptured appendix, RLQ pain, also concern for uretaerolithiasis  FINDINGS:  Lung bases are clear.  Diffuse low-attenuation within the liver. The spleen, pancreas, adrenal glands, gallbladder, and  kidneys are normal. Left ovarian cyst with septation measuring 4.2 cm in greatest dimension.  In the right adnexa there is a complex masslike structure including a large cystic component measuring up to 8.3 cm. At the right superior margin is a calcified and fat-containing masslike component measuring approximately 4.5 x 4.2 cm. There is twisted vascular structures on the right inferolateral margin of this. No normal right ovary is seen. Previously, the normal right ovary is seen posterior to the uterus and at this time the structures anterior to uterus.  No adenopathy. No renal or ureteral stones. Appendectomy. Ventral eventration. Normal bowel wall thickness without obstruction. Uterus is unremarkable.  No aggressive bone lesions.    CT: impression  1. Right adnexal mass with fat and calcifications as well as apparent twisted vascular structures. This is highly suspicious for ovarian dermoid with ovarian torsion. Ultrasound could be considered for further characterization.  2. Left ovarian septated cystic structure which could also be further evaluated with thyroid ultrasound.  3. No urolithiasis or bowel inflammation.  4. Hepatic steatosis.    A&P: Ms. Licona is a 30 year old  with acute onset abdominal pain and cystic structure on CT highly concerning for ovarian torsion. Physical exam consistent with torsion, although patient mildly improved following IV pain medications. Differential also includes TOA, ruptured cyst, bowel obstruction although these are much less likely given combination of history, physical exam, and imaging findings.  Reviewed with the patient that her story is quite concerning for ovarian torsion, and that this is considered a gynecologic emergency. Diagnostic laparoscopy is recommended with possible de-torsion, possible cystectomy, possible oophorectomy, possible open. Patient will be formally consented for surgery upon arrival to [facility] pre-op. She is in agreement with this  plan.   Gynecologic Oncology briefly consulted who feel she is stable for transfer to the [facility] at this time.    - Transfer to [facility] Pre-op for diagnostic laparoscopy, possible de-torsion of right ovary, possible cystectomy, possible oophorectomy, possible open with Dr. Bergman.  - IV Zofran prn for nausea  - IV dilaudid prn for pain  - Maintain NPO status    Patient discussed with Dr. Bergman.  Maryam Hooper MD   Obstetrics and Gynecology, PGY-2  09/05/20. 11:23 PM    I personally examined and evaluated Yuliana Licona on 9/6/2020 on the [facility].  I discussed the patient with Dr. Hooper and agree with the presentation, exam and plan of care documented in this note with edits by me.  Briefly, patient history and imaging concerning for ovarian torsion, reviewed with Gyn Onc who recommended this emergent surgery be performed on the [facility].  Transfer arranged.  Sade Bergman MD MPH

## 2020-09-06 NOTE — BRIEF OP NOTE
Gynecology Brief Op Note    Yuliana Licona  [id number]    Date of Surgery: 9/6/2020    Surgeon: Sade Bergman MD    Assistants: Neli Mclean, PGY-3    Pre-operative Diagnoses: Suspected ovarian torsion    Post-operative Diagnoses: Confirmed ovarian torsion, bilateral ovarian cysts    Procedure: Exploratory laparotomy, right salpingo-oophorectomy    Anesthesia: GETA    EBL: 50cc  IVF: 1000cc  UO: per anesthesia record    Complications: None  Findings: Large abdominal scar with incisional hernia. Right ovary and tube visibly torsed with visualization of twisted right infundibulopelvic ligament. Multilocated cyst of right ovary with serous, solid, dermoid, and endometriotic components. Small simple-appearing cyst of left ovary, left ovary and tube with moderate adhesions to pelvic sidewall and posterior cul-de-sac. Upper abdomen palpated with moderate omental adhesions to superior left aspect of prior vertical midline incision, otherwise relatively clear of adhesions in midline.  Specimens: Right fallopian tube and ovary, right ovarian cyst fluid    Neli Mclean MD  Ob/Gyn Resident, PGY-3  Pager: [phone]  09/06/20 2:35 AM

## 2020-09-06 NOTE — ED PROVIDER NOTES
"    [place] EMERGENCY DEPARTMENT ([facility])  9/05/20  History     Chief Complaint   Patient presents with     Abdominal Pain     The history is provided by the patient and medical records.     Yuliana Licona is a 30 year old female with a past medical history of acute appendicitis, ruptured appendicitis who is s/p appendectomy (1/18/2018) who presents to the Emergency Department for evaluation of abdominal pain and back pain. Patient also reports some mild nausea. Patient reports that she woke up around 11:30 this morning with abdominal pain that felt like she was \"being stabbed in the stomach with a knife\". Patient states that she felt no pain yesterday. Patient reports one episode of vomiting earlier today after drinking a lot of water. Patient states that she has not had anything else to eat or drink all day due to lack of appetite. Patient states that she was able to eat two meals yesterday with no problems. Patient states that she has been shaky form her pain for most of the day. Patient reports that she put icy hot on the site of her pain to help her nap and states that it helped a little. Patient states that her pain is constant and is aggravated upon movement and touch. Patient states that her last BM was yesterday and was almost completely watery but states that this is not abnormal since her appendectomy. Patient denies any chest pain, trouble breathing, cough, or any known sick contacts. Patient denies travel, contact sport participation, or car accidents recently. Patient reports some lower abdominal pain upon urination. Patient denies vaginal discharge or bleeding. Patient states that she is not on birth control. Patient states that she was last sexually active three months ago. Patient states that she has not yet taken any medications for her pain.    I have reviewed the Medications, Allergies, Past Medical and Surgical History, and Social History in the [employer] system.  PAST MEDICAL HISTORY: " "History reviewed. No pertinent past medical history.    PAST SURGICAL HISTORY:   Past Surgical History:   Procedure Laterality Date     APPENDECTOMY      07/18     LAPAROTOMY EXPLORATORY N/A 1/18/2018    Procedure: LAPAROTOMY EXPLORATORY;  Exploratory Laparotomy, Appendectomy;  Surgeon: Brianna Guthrie MD;  Location: [facility] OR     Past medical history, past surgical history, medications, and allergies were reviewed with the patient. Additional pertinent items: None    FAMILY HISTORY:   Family History   Problem Relation Age of Onset     Cancer No family hx of         no skin cancer     SOCIAL HISTORY:  Social History     Tobacco Use     Smoking status: Never Smoker     Smokeless tobacco: Never Used   Substance Use Topics     Alcohol use: Yes     Comment: 3 drinks/day     Social history was reviewed with the patient. Additional pertinent items: None      There are no discharge medications for this patient.       No Known Allergies     Review of Systems   Respiratory: Negative for cough.    Cardiovascular: Negative for chest pain.   Gastrointestinal: Positive for abdominal pain, diarrhea (yesterday), nausea and vomiting.   Genitourinary: Negative for vaginal bleeding and vaginal discharge.   Musculoskeletal: Positive for back pain.       Physical Exam   BP: (!) 140/95  Pulse: 110  Temp: 97.6  F (36.4  C)  Resp: 16  Height: 175.3 cm (5' 9\")  Weight: 70.3 kg (155 lb)  SpO2: 100 %      Physical Exam  General: in pain, able to speak and talk but writhing in bed. Appears stated age.   HENT: dry membarnes, no oropharyngeal lesions  Eyes: PERRL, normal sclerae  Neck: non-tender, supple  Cardio: reg rate. Regular rhythm. Extremities well perfused  Resp: Normal work of breathing, clear breath sounds  Chest/Back: no visual signs of trauma, no CVA tenderness  Abdomen: significant RLQ and suprapubic abdominal tenderness with guarding, non-distended, hypoactive bowel sounds  Neuro: alert and fully oriented. CN II-XII grossly " intact. Grossly normal strength and sensation in all extremities.   MSK: no deformities.   Integumentary/Skin: no rash visualized, normal color  Psych: normal affect, normal behavior    ED Course   7:34 PM  The patient was seen and examined by Mitesh Valdez MD in Room ED27.      See below for course       Results for orders placed or performed during the hospital encounter of 09/05/20 (from the past 24 hour(s))   CBC with platelets differential   Result Value Ref Range    WBC 8.5 4.0 - 11.0 10e9/L    RBC Count 3.57 (L) 3.8 - 5.2 10e12/L    Hemoglobin 12.9 11.7 - 15.7 g/dL    Hematocrit 37.6 35.0 - 47.0 %     (H) 78 - 100 fl    MCH 36.1 (H) 26.5 - 33.0 pg    MCHC 34.3 31.5 - 36.5 g/dL    RDW 15.2 (H) 10.0 - 15.0 %    Platelet Count 217 150 - 450 10e9/L    Diff Method Automated Method     % Neutrophils 82.8 %    % Lymphocytes 11.9 %    % Monocytes 4.5 %    % Eosinophils 0.0 %    % Basophils 0.4 %    % Immature Granulocytes 0.4 %    Nucleated RBCs 0 0 /100    Absolute Neutrophil 7.0 1.6 - 8.3 10e9/L    Absolute Lymphocytes 1.0 0.8 - 5.3 10e9/L    Absolute Monocytes 0.4 0.0 - 1.3 10e9/L    Absolute Eosinophils 0.0 0.0 - 0.7 10e9/L    Absolute Basophils 0.0 0.0 - 0.2 10e9/L    Abs Immature Granulocytes 0.0 0 - 0.4 10e9/L    Absolute Nucleated RBC 0.0    Comprehensive metabolic panel   Result Value Ref Range    Sodium 138 133 - 144 mmol/L    Potassium 3.7 3.4 - 5.3 mmol/L    Chloride 105 94 - 109 mmol/L    Carbon Dioxide 19 (L) 20 - 32 mmol/L    Anion Gap 14 3 - 14 mmol/L    Glucose 101 (H) 70 - 99 mg/dL    Urea Nitrogen 5 (L) 7 - 30 mg/dL    Creatinine 0.52 0.52 - 1.04 mg/dL    GFR Estimate >90 >60 mL/min/[1.73_m2]    GFR Estimate If Black >90 >60 mL/min/[1.73_m2]    Calcium 8.8 8.5 - 10.1 mg/dL    Bilirubin Total 0.8 0.2 - 1.3 mg/dL    Albumin 4.0 3.4 - 5.0 g/dL    Protein Total 7.4 6.8 - 8.8 g/dL    Alkaline Phosphatase 77 40 - 150 U/L    ALT 27 0 - 50 U/L    AST 43 0 - 45 U/L   Lactic acid whole blood   Result  Value Ref Range    Lactic Acid 5.1 (HH) 0.7 - 2.0 mmol/L   HCG qualitative Blood   Result Value Ref Range    HCG Qualitative Serum Negative NEG^Negative   INR   Result Value Ref Range    INR 1.32 (H) 0.86 - 1.14   UA reflex to Microscopic and Culture    Specimen: Urine Midstream; Midstream Urine   Result Value Ref Range    Color Urine Light Yellow     Appearance Urine Clear     Glucose Urine Negative NEG^Negative mg/dL    Bilirubin Urine Negative NEG^Negative    Ketones Urine >150 (A) NEG^Negative mg/dL    Specific Gravity Urine 1.022 1.003 - 1.035    Blood Urine Negative NEG^Negative    pH Urine 6.0 5.0 - 7.0 pH    Protein Albumin Urine Negative NEG^Negative mg/dL    Urobilinogen mg/dL Normal 0.0 - 2.0 mg/dL    Nitrite Urine Negative NEG^Negative    Leukocyte Esterase Urine Moderate (A) NEG^Negative    Source Midstream Urine     RBC Urine 1 0 - 2 /HPF    WBC Urine 14 (H) 0 - 5 /HPF    Squamous Epithelial /HPF Urine 1 0 - 1 /HPF    Mucous Urine Present (A) NEG^Negative /LPF   CT Abdomen Pelvis w Contrast   Result Value Ref Range    Radiologist flags Right adnexal mass suspicious for ovarian torsion (Urgent)     Narrative    EXAMINATION: CT ABDOMEN PELVIS W CONTRAST, 9/5/2020 9:50 PM    TECHNIQUE:  Helical CT images from the lung bases through the  symphysis pubis were obtained  with IV contrast. Contrast dose: Isovue  370    COMPARISON: 1/18/2018    HISTORY: prior ruptured appendix, RLQ pain, also concern for  uretaerolithiasis    FINDINGS:  Lung bases are clear.    Diffuse low-attenuation within the liver. The spleen, pancreas,  adrenal glands, gallbladder, and kidneys are normal. Left ovarian cyst  with septation measuring 4.2 cm in greatest dimension.    In the right adnexa there is a complex masslike structure including a  large cystic component measuring up to 8.3 cm. At the right superior  margin is a calcified and fat-containing masslike component measuring  approximately 4.5 x 4.2 cm. There is twisted  vascular structures on  the right inferolateral margin of this. No normal right ovary is seen.  Previously, the normal right ovary is seen posterior to the uterus and  at this time the structures anterior to uterus.    No adenopathy. No renal or ureteral stones. Appendectomy. Ventral  eventration. Normal bowel wall thickness without obstruction. Uterus  is unremarkable.    No aggressive bone lesions.      Impression    IMPRESSION:   1. Right adnexal mass with fat and calcifications as well as apparent  twisted vascular structures. This is highly suspicious for ovarian  dermoid with ovarian torsion. Ultrasound could be considered for  further characterization.  2. Left ovarian septated cystic structure which could also be further  evaluated with thyroid ultrasound.  3. No urolithiasis or bowel inflammation.  4. Hepatic steatosis.    [Urgent Result: Right adnexal mass suspicious for ovarian torsion]    Finding was identified on 9/5/2020 9:53 PM.     Dr. Valdez was contacted by Dr. Carrasco at 9/5/2020 10:07 PM and  verbalized understanding of the urgent finding.     ROCKY CARRASCO MD   Lactic acid   Result Value Ref Range    Lactic Acid 1.2 0.7 - 2.0 mmol/L   Asymptomatic COVID-19 Virus (Coronavirus) by PCR    Specimen: Nasopharyngeal   Result Value Ref Range    COVID-19 Virus PCR to [facility] - Source Nasopharyngeal     COVID-19 Virus PCR to [facility] - Result       Test received-See reflex to IDDL test SARS CoV2 (COVID-19) Virus RT-PCR     Medications   lactated ringers infusion (has no administration in time range)   0.9% sodium chloride BOLUS (0 mLs Intravenous Stopped 9/5/20 2121)   ondansetron (ZOFRAN) injection 4 mg (4 mg Intravenous Given 9/5/20 2330)   HYDROmorphone (PF) (DILAUDID) injection 0.5 mg (0.5 mg Intravenous Given 9/5/20 2330)   iopamidol (ISOVUE-370) solution 95 mL (95 mLs Intravenous Given 9/5/20 2139)   sodium chloride (PF) 0.9% PF flush 74 mL (74 mLs Intravenous Given 9/5/20 2139)   HYDROmorphone  (PF) (DILAUDID) injection 0.5 mg (0.5 mg Intravenous Given 20)   0.9% sodium chloride BOLUS (0 mLs Intravenous Stopped 20)   HYDROmorphone (DILAUDID) injection 1 mg (1 mg Intravenous Given 20)   ondansetron (ZOFRAN) injection 4 mg (4 mg Intravenous Given 20)           Assessments & Plan (with Medical Decision Making)   This is a otherwise healthy 30-year-old female who has a complicated history of ruptured appendicitis status post ex lap as well as multiple intra-abdominal abscesses that were treated conservatively with antibiotics 2 years ago.  Since then she has not any other problems and has now 1 day of mostly right lower quadrant pain that is concerning for possible intra-abdominal abscess, small bowel obstruction, nephrolithiasis, ureterolithiasis, fistula formation, ovarian torsion. Ordered labs, urine, CT immediately.    Here workup here is significant for lactate 5, stable vitals, exam with guarding, negative UPT, negative urinalysis, normal WBC, slight acidosis, normal LFTs.     Patient had CT done at 9:40PM and that is when I looked at the images. I was concerned for ovarian torsion on the right based on the CT. I spoke with radiology at 10:04 and they thought the same. Possibly dermoid cyst on the right causing the torsion and twisted pedicle. Also may be caused by prior surgery adhesions?     I updated the patient of my concern for ovarian torsion. She told me she is a G1 and had  7 years ago.     I immediately paged gynecology at this time for ovarian torsion and spoke with resident at 10:20PM, they agreed to evaluate patient.    Patient given total 2mg dilaudid over course of ED stay as well as two doses of zofran for nausea. 2L fluid given overall, patient NPO.     Gynecology agreed to take patient to OR for ovarian torsion, will transport [place] [facility] [place] 3rd floor.     Spoke with [facility] and nurse to facilitate this. Repeat lactate normal. Patient  stable at time of transportation.    I have reviewed the nursing notes.    I have reviewed the findings, diagnosis, plan and need for follow up with the patient.    Final diagnoses:   Torsion of right ovary   IBrad, am serving as a trained medical scribe to document services personally performed by Mitesh Valdez MD, based on the provider's statements to me.  IMitesh MD, was physically present and have reviewed and verified the accuracy of this note documented by Brad Sun.      9/5/2020   [facility], [place], EMERGENCY DEPARTMENT     Mitesh Valdez MD  09/06/20 0048

## 2020-09-06 NOTE — DISCHARGE SUMMARY
[facility]  Gynecology Discharge Summary    Admission Date: 20   Discharge Date: 20     Admission Attending: Sade Bergman MD  Discharge Attending: Sade Bergman MD    Admission Diagnosis:  - Abdominal pain, concern for ovarian torsion    Discharge Diagnosis:  - Confirmed ovarian torsion    Procedures Performed:  - Exploratory Laparotomy, Right salpingo-oophorectomy    Admission History:  Yuliana Licona is a 30 year old  with PMH notable for ruptured appendicitis, complicated by postoperative abscess, presenting with abdominal pain. Her pain started this morning around 1130. It was present when she woke up. It is constant, but waxes and wanes in intensity. She describes it as sharp, intense pain predominantly in the right lower aspect of her abdomen. She feels as if she is being stabbed. She endorses nausea and single episode of emesis after attempting to drink a large amount of water. Reports lack of appetite today. She denies vaginal bleeding, discharge, dysuria. She endorses increased urinary frequency.      ED course: Upon arrival, her vitals were notable for tachycardia. Laboratory workup with a hemoglobin of 12.9, WBC 8.5, lactic acid of 5.1, UA with ketones and moderate leukocyte esterase. She received Zofran and Dilaudid for symptom relief. A CT was obtained with results concerning for ovarian torsion at which time gynecology was notified    Operative Findings:   Broad vertical midline abdominal scar with incisional hernia. Right ovary and tube visibly torsed with visualization of twisted right infundibulopelvic ligament. Multilocated cyst of right ovary with serous, solid, dermoid, and endometriotic components. Small simple-appearing cyst of left ovary, left ovary and tube with moderate adhesions to pelvic sidewall and posterior cul-de-sac. Upper abdomen palpated with moderate omental adhesions to superior left aspect of prior vertical midline incision,  otherwise relatively clear of adhesions in midline    Hospital Course:  Her postoperative course was uncomplicated. She was initially maintained on IV fluids with IV pain medications and phillips catheter in place. On POD#1, diet was advanced, phillips catheter was removed, and she was transitioned to PO pain medications. On POD#1 she was tolerating regular diet, ambulating without difficulty, voiding spontaneously, and controlling pain with PO medications, and she was deemed stable for discharge. Hemoglobin at the time of discharge was 11.8.    Discharge Plans:  - The patient was discharged to home  - PO Activity:   - No lifting >15 lbs or strenuous exercise for 6 weeks   - No driving while taking narcotics or until you can slam on the breaks without pain  - She was instructed to call [facility] or return to ED if she has any of the following:    - Temperature greater than 100.4F   - Pain not controlled by pain medications   - Uncontrolled nausea/vomiting   - Foul-smelling vaginal discharge   - Vaginal bleeding soaking 1 pad per hour for 2 hours in a row  - She will follow up with OB/GYN in 2 weeks for a postop visit    - Discharge medications include:     Review of your medicines      START taking      Dose / Directions   acetaminophen 325 MG tablet  Commonly known as:  TYLENOL      Dose:  650 mg  Take 2 tablets (650 mg) by mouth every 6 hours as needed for mild pain Start after Delivery.  Quantity:  100 tablet  Refills:  0     ibuprofen 600 MG tablet  Commonly known as:  ADVIL/MOTRIN      Dose:  600 mg  Take 1 tablet (600 mg) by mouth every 6 hours as needed for moderate pain Start after delivery  Quantity:  60 tablet  Refills:  0     oxyCODONE-acetaminophen 5-325 MG tablet  Commonly known as:  PERCOCET  Used for:  Postoperative state      Dose:  1 tablet  Take 1 tablet by mouth every 6 hours as needed for pain  Quantity:  12 tablet  Refills:  0     senna-docusate 8.6-50 MG tablet  Commonly known as:  SENOKOT-S/PERICOLACE       Dose:  1 tablet  Take 1 tablet by mouth daily Start after delivery.  Quantity:  100 tablet  Refills:  0           Where to get your medicines      These medications were sent to [facility] [place], [place] - [address]  [address] [facility] [address], [facility] [address]    Phone:  [phone]     acetaminophen 325 MG tablet    ibuprofen 600 MG tablet    oxyCODONE-acetaminophen 5-325 MG tablet    senna-docusate 8.6-50 MG tablet          Addie Mcclain MD  Ob/Gyn PGY-4  09/07/20 3:58 PM      I personally examined and evaluated Yuliana Licona on 9/7/2020.  I agree with the presentation, hospital details and plan of care this discharge summary with edits by me.   Sade Bergman MD MPH

## 2020-09-06 NOTE — ANESTHESIA PREPROCEDURE EVALUATION
"Anesthesia Pre-Procedure Evaluation    Patient: Yuliana Licona   MRN:     [id number] Gender:   female   Age:    30 year old :      1990        Preoperative Diagnosis: Ovarian torsion [N83.519]   Procedure(s):  Right Mini Laparotomy with possible Oopherectomy     LABS:  CBC:   Lab Results   Component Value Date    WBC 8.5 2020    WBC 6.4 2018    HGB 12.9 2020    HGB 10.9 (L) 2018    HCT 37.6 2020    HCT 34.1 (L) 2018     2020     2018     BMP:   Lab Results   Component Value Date     2020     2018    POTASSIUM 3.7 2020    POTASSIUM 3.5 2018    CHLORIDE 105 2020    CHLORIDE 106 2018    CO2 19 (L) 2020    CO2 22 2018    BUN 5 (L) 2020    BUN 5 (L) 2018    CR 0.52 2020    CR 0.41 (L) 2018     (H) 2020    GLC 66 (L) 2018     COAGS:   Lab Results   Component Value Date    INR 1.32 (H) 2020     POC:   Lab Results   Component Value Date    BGM 96 2018    HCGS Negative 2020     OTHER:   Lab Results   Component Value Date    LACT 1.2 2020    A1C 4.6 2018    MOE 8.8 2020    PHOS 3.0 2018    MAG 1.9 2018    ALBUMIN 4.0 2020    PROTTOTAL 7.4 2020    ALT 27 2020    AST 43 2020    ALKPHOS 77 2020    BILITOTAL 0.8 2020    LIPASE 61 (L) 2018        Preop Vitals    BP Readings from Last 3 Encounters:   20 (!) 135/93   18 117/82   18 104/68    Pulse Readings from Last 3 Encounters:   20 119   18 106   18 72      Resp Readings from Last 3 Encounters:   20 16   18 16   18 16    SpO2 Readings from Last 3 Encounters:   20 97%   18 100%   18 98%      Temp Readings from Last 1 Encounters:   20 36.7  C (98.1  F) (Oral)    Ht Readings from Last 1 Encounters:   20 1.753 m (5' 9\")      Wt Readings from " "Last 1 Encounters:   09/05/20 70.3 kg (155 lb)    Estimated body mass index is 22.89 kg/m  as calculated from the following:    Height as of this encounter: 1.753 m (5' 9\").    Weight as of this encounter: 70.3 kg (155 lb).     LDA:  Peripheral IV 09/05/20 Left Upper forearm (Active)   Number of days: 1       ETT (adult) (Active)   Number of days: 944       Negative Pressure Wound Therapy Abdomen Mid;Lower (Active)   Number of days: 943        History reviewed. No pertinent past medical history.   Past Surgical History:   Procedure Laterality Date     APPENDECTOMY      07/18     LAPAROTOMY EXPLORATORY N/A 1/18/2018    Procedure: LAPAROTOMY EXPLORATORY;  Exploratory Laparotomy, Appendectomy;  Surgeon: Brianna Guthrie MD;  Location: [facility] OR      No Known Allergies     Anesthesia Evaluation     . Pt has had prior anesthetic. Type: General    No history of anesthetic complications          ROS/MED HX    ENT/Pulmonary:  - neg pulmonary ROS     Neurologic:  - neg neurologic ROS     Cardiovascular:  - neg cardiovascular ROS       METS/Exercise Tolerance:     Hematologic:  - neg hematologic  ROS       Musculoskeletal:  - neg musculoskeletal ROS       GI/Hepatic: Comment: S/p appendectomy. Presenting with presumed ovarian torsion.         Renal/Genitourinary:  - ROS Renal section negative       Endo:  - neg endo ROS       Psychiatric:  - neg psychiatric ROS       Infectious Disease:  - neg infectious disease ROS       Malignancy:      - no malignancy   Other:    - neg other ROS                     PHYSICAL EXAM:   Mental Status/Neuro:    Airway: Facies: Feasible  Mallampati: I  Mouth/Opening: Full  TM distance: > 6 cm  Neck ROM: Full   Respiratory: Auscultation: CTAB     Resp. Rate: Normal     Resp. Effort: Normal      CV: Rhythm: Regular  Rate: Age appropriate  Heart: Normal Sounds  Edema: None   Comments:      Dental: Normal Dentition                Assessment:   ASA SCORE: 2 emergent   H&P: History and physical " reviewed and following examination; no interval change.   Smoking Status:  Non-Smoker/Unknown   NPO Status: ELEVATED Aspiration Risk/Unknown     Plan:   Anes. Type:  General   Pre-Medication: None   Induction:  IV (RSI)   Airway: ETT; Oral   Access/Monitoring: PIV   Maintenance: Balanced     Postop Plan:   Postop Pain: Opioids  Postop Sedation/Airway: Not planned  Disposition: Inpatient/Admit     PONV Management:   Adult Risk Factors: Female, Non-Smoker, Postop Opioids   Prevention: Ondansetron, Dexamethasone     CONSENT: Direct conversation   Plan and risks discussed with: Patient   Blood Products: Consented (ALL Blood Products)                   Modesto Yadav MD

## 2020-09-06 NOTE — PLAN OF CARE
[id number]  Patient A & O x4. VSS-pt not on CAPNO anymore due to itching pulse ox on. Pt up IND in the room pt has a steady gait. Lung sounds . Patient denies chest pain, SOB, lightheadedness, dizziness, tingling, numbness, nausea, and vomiting. Bowel sounds active, passing flatus, and tolerating regular diet. Drinking well and phillips pulled around 1230 after pt was up walking. PIV SL-encouraged fluids. Pain is tolerable and patient taking scheduled tylenol and PRN ibuprofen for pain, ice pack applied.Plan is to discharge to home tomorrow if pt continues to improve. Patient demonstrates ability to use call light appropriately. Will continue to monitor patient.

## 2020-09-06 NOTE — OR NURSING
Pt to recovery wide a wake asking questions - c/o pain at surgical site meds given , vitals stable pt denied ice chips at this time - asked to call mother to update - Mom called and given update- plan to obtain bed on floor for pt to go to for the night

## 2020-09-06 NOTE — ED TRIAGE NOTES
Pt. Presents to ED with worsening/persistent RLQ pain that started around 1130 this AM. Pt. Reports nausea and vomiting only when she takes anything PO. Reports last BM yesterday that was loose and has been happening for the past week. Pt. Reports voiding normally but always feeling like she needs to go. Pt. Reports a Hx of appendectomy about 2 years ago. Pt. A & O x 4, independent. AVSS on RA.

## 2020-09-06 NOTE — OP NOTE
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 Right ovary and tube visibly torsed with visualization of twisted (x 2) right infundibulopelvic ligament. Multilocated cyst of right ovary with serous, solid, dermoid, and endometriotic components. Small simple-appearing cyst of left ovary, left ovary and tube with moderate adhesions to pelvic sidewall and posterior cul-de-sac. Upper abdomen palpated with moderate omental adhesions to superior left aspect of prior vertical midline incision, otherwise relatively clear of adhesions in midline.  Appendix not visualized (prior appendectomy).      Indications: Yuliana Licona is a 30 year old  who presented to the ED with intermittent RLQ pain as well as nausea and emesis. CT showed a right adnexal mass with fat and calcifications as well as apparent twisted vascular structures. Therefore, ovarian torsion was suspected and surgical management was recommended. Discussed risks, benefits, and alternatives to the procedure including risk of infection, bleeding, damage to local organs, possible decrease in future fertility. The patient's questions were answered, understanding confirmed, and the patient signed written informed consent.    Technique: The patient was  brought to the operating room where she was positioned in the dorsal supine position. General anesthesia was administered and found to be adequate. She was prepped and draped in the usual sterile fashion. A surgical timeout was performed.     A small transverse incision was marked on the lower abdomen in the midline and entered sharply. The incision was carried down through the subcutaneous tissue to the fascia. The fascia was scored on both sides of midline with the scalpel and these incisions were extended bilaterally with Alvarez scissors. The fascia was elevated superiorly with two Kocher clamps and dissected from the underlying rectus muscles with sharp and blunt dissection. This was then repeated at the inferior aspect of the incision. The rectus muscles were  and the peritoneum was entered bluntly.     The abdominal cavity was then examined, yielding the findings noted above. The right ovary and tube were twisted into their anatomic positions. However, given the complex nature of the cyst and no identifiable normal ovarian tissue, the decision was made to remove the right ovary and tube in entirety. A medium Daniel-O retractor was inserted into the incision. The right ovarian cyst was brought to the surface of the incision and was lanced with a spinal needle attached to a 20 cc syringe. The cyst was aspirated and the fluid was placed into a specimen cup. A portion of the fluid was then sent for cytology.  A larger defect was then made in the cyst wall with Metzenbaum scissors and the remainder of the cyst fluid was aspirated with the pool tip suction cannula. The remainder of the cyst wall and solid cystic components were then able to be elevated through the incision, visualizing the infundibulopelvic ligament. However during this process, incidental rupture of one of the endometriotic components of the cyst was noted. The infundibulopelvic ligament was then crossclamped with a curved Smita clamp, and  the right ovary and tube were amputated with Alvarez scissors. The remaining pedicle was ligated with a free tie of 0 Vicryl, and then additionally suture-ligated with another suture of 0 Vicryl. The pedicle was inspected and hemostasis was ensured.     The left ovary was inspected and noted to be moderately adherent to the left pelvic sidewall and posterior cul-de-sac. Therefore, the decision was made to leave it in place without intervention. The pelvis and abdomen were then irrigated with 1 L of normal saline.  The rectus muscles and fascial edges were then inspected for any areas of oozing and these were controlled with cautery. The fascia was then closed with a running suture of 0 looped PDS. The incision was again irrigated with warm saline, and the subcutaneous tissue was inspected for hemostasis. Any areas of oozing were controlled with cautery and hemostasis was ensured. The skin was then closed with a subcuticular suture of 4-0 Monocryl and skin glue.    Instrument, needle, and sponge counts were correct times 2. Dr. Bergman was present for the critical portions of the procedure and immediately available at all other times. The patient was transferred to the PACU in stable condition.    Neli Mclean MD  Ob/Gyn Resident, PGY-3  Pager: [phone]  09/06/20 9:36 AM     I participated in all aspects of Yuliana Licona's case with Dr. Mclean on 9/6/2020 and agree with the operative details in this note with edits by me.     Sade Bergman MD MPH

## 2020-09-06 NOTE — ANESTHESIA POSTPROCEDURE EVALUATION
Anesthesia POST Procedure Evaluation    Patient: Yuliana Licona   MRN:     [id number] Gender:   female   Age:    30 year old :      1990        Preoperative Diagnosis: Ovarian torsion [N83.519]   Procedure(s):  Right Mini Laparotomy with Right Salpingoopherectomy   Postop Comments: No value filed.     Anesthesia Type: General       Disposition: Admission   Postop Pain Control: Uneventful            Sign Out: Well controlled pain   PONV: No   Neuro/Psych: Uneventful            Sign Out: Acceptable/Baseline neuro status   Airway/Respiratory: Uneventful            Sign Out: Acceptable/Baseline resp. status   CV/Hemodynamics: Uneventful            Sign Out: Acceptable CV status   Other NRE: NONE   DID A NON-ROUTINE EVENT OCCUR? No         Last Anesthesia Record Vitals:  CRNA VITALS  2020 0150 - 2020 0250      2020             Pulse:  85    Ht Rate:  85    Temp:  37  C (98.6  F)    SpO2:  100 %    Resp Rate (observed):  18    EKG:  NSR          Last PACU Vitals:  Vitals Value Taken Time   /87 2020  2:45 AM   Temp 36.3  C (97.4  F) 2020  2:45 AM   Pulse 95 2020  2:55 AM   Resp 13 2020  2:55 AM   SpO2 95 % 2020  2:55 AM   Temp src     NIBP 140/88 2020  2:26 AM   Pulse 85 2020  2:30 AM   SpO2 100 % 2020  2:30 AM   Resp     Temp 37  C (98.6  F) 2020  2:30 AM   Ht Rate 85 2020  2:30 AM   Temp 2     Vitals shown include unvalidated device data.      Electronically Signed By: Modesto Yadav MD, 2020, 2:55 AM

## 2020-09-06 NOTE — OR NURSING
PACU to Inpatient Nursing Handoff    Patient Yuliana Licona is a 30 year old female who speaks English.   Procedure Procedure(s):  Right Mini Laparotomy with Right Salpingoopherectomy   Surgeon(s) Primary: Sade Bergman MD  Resident - Assisting: Neli Mclean MD     No Known Allergies    Isolation  [unfilled]     Past Medical History   has no past medical history on file.    Anesthesia General   Dermatome Level     Preop Meds Not applicable   Nerve block Not applicable   Intraop Meds midazolam 1mg/mL (mg)  1 mg  Given  Intravenous  09/06/20 0051   Olga Lidia Grady APRN CRNA     Total dose as of 09/06/20 0230            1 mg            fentaNYL (SUBLIMAZE) injection (mcg)  50 mcg  Given  Intravenous  09/06/20 0115   Olga Lidia Grady APRN CRNA     Total dose as of 09/06/20 0230  50 mcg  Given  Intravenous  0118   Olga Lidia Grady APRN CRNA     200 mcg  50 mcg  Given  Intravenous  0124   Olga Lidia Grady APRN CRNA      50 mcg  Given  Intravenous  0219   Olga Lidia Grady APRN CRNA     lidocaine 2% (mg)  100 mg  Given  Intravenous  09/06/20 0100   Olga Lidia Grady APRN CRNA     Total dose as of 09/06/20 0230            100 mg            propofol (DIPRIVAN) injection 10 mg/mL vial (mg)  150 mg  Given  Intravenous  09/06/20 0100   Olga Lidia Grady APRN CRNA     Total dose as of 09/06/20 0230            150 mg            rocuronium 10mg/mL (mg)  25 mg  Given  Intravenous  09/06/20 0114   Olga Lidia Grady APRN CRNA  edited    Total dose as of 09/06/20 0230  10 mg  Given  Intravenous  0130   Olga Lidia Grady APRN CRNA     35 mg            dexamethasone 4mg/mL (mg)  6 mg  Given  Intravenous  09/06/20 0105   Olga Lidia Grady APRN CRNA     Total dose as of 09/06/20 0230            6 mg            HYDROmorphone 1 mg/ml (mg)  0.25 mg  Given  Intravenous  09/06/20 0224   Olga Lidia Grady APRN CRNA     Total dose as of 09/06/20 0236   0.25 mg  Given  Intravenous  0230   Olga Lidia Grady APRN CRNA     0.5 mg            sugammadex 200 mg/2ml (mg)  150 mg  Given  Intravenous  09/06/20 0213   Olga Lidia Grady APRN CRNA     Total dose as of 09/06/20 0230            150 mg            succinylcholine 20 mg/mL (mg)  80 mg  Given  Intravenous  09/06/20 0100   Olga Lidia Grady APRN CRNA     Total dose as of 09/06/20 0230            80 mg                 Local Meds No   Antibiotics cefazolin (Ancef) - last given at 2grams at 0102     Pain Patient Currently in Pain: yes  Comfort: tolerable with discomfort  Pain Control: partially effective   PACU meds  hydromorphone (Dilaudid): .5 mg (total dose) last given at 0240 Dilaudid 0.5mg given at 0253 / 5mg oxycodone @0307   PCA / epidural No   Capnography     Telemetry     Inpatient Telemetry Monitor Ordered? No        Labs Glucose Lab Results   Component Value Date     09/05/2020       Hgb Lab Results   Component Value Date    HGB 12.9 09/05/2020       INR Lab Results   Component Value Date    INR 1.32 09/05/2020      PACU Imaging Not applicable     Wound/Incision Negative Pressure Wound Therapy Abdomen Mid;Lower (Active)   Number of days: 943       Incision/Surgical Site 01/18/18 Abdomen (Active)   Number of days: 962       Incision/Surgical Site 09/06/20 Right Abdomen (Active)   Incision Assessment UTV 09/06/20 0238   Closure Liquid bandage 09/06/20 0238   Dressing Intervention Open to air / No Dressing 09/06/20 0238   Number of days: 0       Packing 01/18/18 Abdomen Qty Placed: 1 (Active)   Number of days: 962      CMS        Equipment Not applicable   Other LDA       IV Access Peripheral IV 09/05/20 Left Upper forearm (Active)   Site Assessment WDL 09/06/20 0234   Line Status Infusing 09/06/20 0234   Phlebitis Scale 0-->no symptoms 09/06/20 0234   Infiltration Scale 0 09/06/20 0234   Infiltration Site Treatment Method  None 09/06/20 0234   Extravasation? No 09/06/20 0234   Number  of days: 1      Blood Products Not applicable EBL 50 mL   Intake/Output    Drains / Nunes Negative Pressure Wound Therapy Abdomen Mid;Lower (Active)   Number of days: 943       Urethral Catheter Latex (Active)   Securement Method Securing device (Describe) 09/06/20 0234   Rationale for Continued Use Anesthesia 09/06/20 0234   Number of days: 0      Time of void PreOp Void Prior to Procedure: 2200 (09/06/20 0034)    PostOp      Diapered? No    Bladder Scan     PO    ice chips     Vitals    B/P: 137/82  T: 97.2  F (36.2  C)    Temp src: Axillary 97.2    P:  Pulse: 119 (09/05/20 2312)          R: 18  O2:  SpO2: 100 %                Family/support present none   Patient belongings     Patient transported on cart   DC meds/scripts (obs/outpt) Not applicable   Inpatient Pain Meds Released? Yes       Special needs/considerations None   Tasks needing completion None       Charu Salas RN  ASCOM [id number]

## 2020-09-07 VITALS
RESPIRATION RATE: 16 BRPM | TEMPERATURE: 97.4 F | DIASTOLIC BLOOD PRESSURE: 63 MMHG | BODY MASS INDEX: 22.96 KG/M2 | HEART RATE: 66 BPM | WEIGHT: 155 LBS | SYSTOLIC BLOOD PRESSURE: 112 MMHG | HEIGHT: 69 IN | OXYGEN SATURATION: 98 %

## 2020-09-07 LAB
BACTERIA SPEC CULT: NORMAL
HGB BLD-MCNC: 11.8 G/DL (ref 11.7–15.7)
Lab: NORMAL
SPECIMEN SOURCE: NORMAL

## 2020-09-07 PROCEDURE — 25000132 ZZH RX MED GY IP 250 OP 250 PS 637: Performed by: STUDENT IN AN ORGANIZED HEALTH CARE EDUCATION/TRAINING PROGRAM

## 2020-09-07 PROCEDURE — 85018 HEMOGLOBIN: CPT | Performed by: STUDENT IN AN ORGANIZED HEALTH CARE EDUCATION/TRAINING PROGRAM

## 2020-09-07 PROCEDURE — 36415 COLL VENOUS BLD VENIPUNCTURE: CPT | Performed by: STUDENT IN AN ORGANIZED HEALTH CARE EDUCATION/TRAINING PROGRAM

## 2020-09-07 RX ORDER — AMOXICILLIN 250 MG
1 CAPSULE ORAL DAILY
Qty: 100 TABLET | Refills: 0 | Status: SHIPPED | OUTPATIENT
Start: 2020-09-07 | End: 2023-08-21

## 2020-09-07 RX ORDER — OXYCODONE AND ACETAMINOPHEN 5; 325 MG/1; MG/1
1 TABLET ORAL EVERY 6 HOURS PRN
Qty: 12 TABLET | Refills: 0 | Status: SHIPPED | OUTPATIENT
Start: 2020-09-07 | End: 2023-08-21

## 2020-09-07 RX ORDER — ACETAMINOPHEN 325 MG/1
650 TABLET ORAL EVERY 6 HOURS PRN
Qty: 100 TABLET | Refills: 0 | Status: SHIPPED | OUTPATIENT
Start: 2020-09-07 | End: 2023-08-21

## 2020-09-07 RX ORDER — OXYCODONE AND ACETAMINOPHEN 5; 325 MG/1; MG/1
1 TABLET ORAL EVERY 6 HOURS PRN
Qty: 12 TABLET | Refills: 0 | Status: SHIPPED | OUTPATIENT
Start: 2020-09-07 | End: 2020-09-07

## 2020-09-07 RX ORDER — IBUPROFEN 600 MG/1
600 TABLET, FILM COATED ORAL EVERY 6 HOURS PRN
Qty: 60 TABLET | Refills: 0 | Status: SHIPPED | OUTPATIENT
Start: 2020-09-07 | End: 2023-08-21

## 2020-09-07 RX ADMIN — ACETAMINOPHEN 975 MG: 325 TABLET, FILM COATED ORAL at 12:34

## 2020-09-07 RX ADMIN — IBUPROFEN 600 MG: 600 TABLET ORAL at 18:09

## 2020-09-07 RX ADMIN — ACETAMINOPHEN 975 MG: 325 TABLET, FILM COATED ORAL at 04:07

## 2020-09-07 RX ADMIN — DOCUSATE SODIUM AND SENNOSIDES 2 TABLET: 8.6; 5 TABLET ORAL at 07:45

## 2020-09-07 RX ADMIN — IBUPROFEN 600 MG: 600 TABLET ORAL at 06:58

## 2020-09-07 NOTE — PROGRESS NOTES
Gynecology Progress Note    Subjective:  Patient is resting comfortably in bed.  Complains of minimal incisional pain - 5/10 discomfort as pain medication wears off, worse with sudden movements but quickly subsides after several moments. States she is tolerating pain with the current medication regimen.  Voiding spontaneously. Passing flatus/BM.  Ambulating without dizziness or difficulty.  She denies any nausea/vomiting, chest pain, shortness of breath, or other systemic complaints.    Objective:  Vitals:    20 0630 20 0723 20 1517 20 2309   BP: 125/74 130/69 134/75 125/77   BP Location: Right arm Right arm Right arm Left arm   Pulse: 66 66 70 67   Resp: 18 16 14 16   Temp:  97.4  F (36.3  C) 97.6  F (36.4  C) 97.7  F (36.5  C)   TempSrc:  Oral Oral Oral   SpO2: 93% 95% 98% 98%   Weight:       Height:           General:  A&Ox3. NAD. Resting in bed  CV: RRR.  Pulm: CTAB. Normal respiratory effort.  Abd: Soft, non-distended. Appropriately tender. Incision CDI to the lower suprapubic area -no surrounding erythema or induration.   Ext: Trace edema    Assessment/Plan:  Yuliana Licona is a 30 year old  female who is POD#1 s/p XL, RSO for ovarian torsion.  Doing well post-operatively.    Routine post-operative goals: Encourage ambulation and spirometry.  FEN: ADAT  Pain: continue oral medications - is currently only requiring tylenol/ibuprofen  Heme: Hgb: 12.9>EBL 50cc> 11.8.    : Voiding spontaneously   GI: Tolerating PO intake. Continue anti-emetics and stool softeners as needed.  PPX: SCDs while in bed, encourage ambulation   Dispo: Discharge to home on POD#1-2    # Pain Assessment:  Current Pain Score 2020   Patient currently in pain? sleeping: patient not able to self report   Pain score (0-10) -   Pain location -   Pain descriptors -   - Yuliana is experiencing pain due to incision to abdomen. Pain management was discussed and the plan was created in a collaborative fashion.   Yuliana's response to the current recommendations: engaged and compliant.  - Please see the plan for pain management as documented above    Betzaida Hall MD  OB/GYN PGY-1  09/07/20 6:28 AM    I personally examined and evaluated Yuliana Licona on 9/7/2020.  I discussed the patient with Dr. Hall and agree with the presentation, exam and plan of care documented in this note with edits by me. Reviewed findings at time of surgery and expectations for recovery.  Poss discharge later today.  Sade Bergman MD MPH

## 2020-09-07 NOTE — PLAN OF CARE
Pt A/O X 4. Afebrile. VSS. Lungs-Clear bilaterally with both anterior and posterior. IS encouraged. Bowels-Hyperactive in all four quadrants. Voids spontaneously without difficulty in the bathroom. Denies nausea and vomiting. CMS and Neuro's are intact. Denies numbness and tingling in all extremities. Has pain in the lower abdomin and given scheduled Tylenol, and pain is manageable. Is on a Regular diet and appetite was Good this shift. Lower abdomin incision is C/D/I and Open to air, abdominal binder in place. Pt up in room independently. PIV patent in the left arm and saline locked. PCD's on BLE's. Bilateral heels are elevated off the bed. Pt is able to make needs known, and call light is within reach. Writer was called from  and notified that pt will be discharging today. Continue to monitor.

## 2020-09-07 NOTE — PLAN OF CARE
VS:     VSS   Output:     Voiding without any issues    Activity:     Up independently   Skin: Intact      Pain:     Pain well managed with scheduled tylenol and prn Ibuprofen    CMS:     Denies numbness and tingling.   Dressing:     Abdominal incision: liquid Band-Aid intact.     Diet:     Tolerating regular diet    LDA:     PIV patent and SL   Equipment:     IV tiff, IV pump   Plan:     Discharge home    Additional Info:

## 2020-09-07 NOTE — PLAN OF CARE
Pt. discharged at 1830to home. Pt. was accompanied by family member and left with personal belongings. Pt. received complete discharge paperwork and all medications script as filled by discharge pharmacy. Pt. was given times of last dose for all discharge medications in writing on discharge medication sheets. Discharge teaching included medication, pain management, activity restrictions, dressing changes, and signs and symptoms of infection. Pt. to follow up with OB if any abnormal findings regarding infection. Pt. had no further questions at the time of discharge and no unmet needs were identified.

## 2020-09-10 LAB — COPATH REPORT: NORMAL

## 2020-09-16 ENCOUNTER — TELEPHONE (OUTPATIENT)
Dept: OBGYN | Facility: CLINIC | Age: 30
End: 2020-09-16

## 2020-09-16 NOTE — TELEPHONE ENCOUNTER
Called and LVM explaining path results from surgery.  Offered wishes for ongoing healing and encouraged call back with questions/concerns and to make postop appt in ~2 weeks.  MD Raj

## 2023-01-06 ENCOUNTER — HOSPITAL ENCOUNTER (EMERGENCY)
Facility: CLINIC | Age: 33
Discharge: HOME | End: 2023-01-07
Attending: EMERGENCY MEDICINE | Admitting: EMERGENCY MEDICINE
Payer: COMMERCIAL
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
## 2023-01-06 DIAGNOSIS — S80.02XA CONTUSION OF LEFT KNEE, INITIAL ENCOUNTER: ICD-10-CM

## 2023-01-06 DIAGNOSIS — S80.02XA CONTUSION OF LEFT KNEE: ICD-10-CM

## 2023-01-06 DIAGNOSIS — S09.90XA HEAD INJURY: ICD-10-CM

## 2023-01-06 PROCEDURE — 250N000011 HC RX IP 250 OP 636: Performed by: EMERGENCY MEDICINE

## 2023-01-06 PROCEDURE — 99284 EMERGENCY DEPT VISIT MOD MDM: CPT | Performed by: EMERGENCY MEDICINE

## 2023-01-06 PROCEDURE — 99285 EMERGENCY DEPT VISIT HI MDM: CPT | Mod: 25

## 2023-01-06 PROCEDURE — 250N000013 HC RX MED GY IP 250 OP 250 PS 637: Performed by: EMERGENCY MEDICINE

## 2023-01-06 RX ORDER — ONDANSETRON 4 MG/1
4 TABLET, ORALLY DISINTEGRATING ORAL ONCE
Status: COMPLETED | OUTPATIENT
Start: 2023-01-06 | End: 2023-01-06

## 2023-01-06 RX ORDER — IBUPROFEN 600 MG/1
600 TABLET, FILM COATED ORAL ONCE
Status: COMPLETED | OUTPATIENT
Start: 2023-01-06 | End: 2023-01-06

## 2023-01-06 RX ORDER — ACETAMINOPHEN 325 MG/1
975 TABLET ORAL ONCE
Status: COMPLETED | OUTPATIENT
Start: 2023-01-06 | End: 2023-01-06

## 2023-01-06 RX ADMIN — ACETAMINOPHEN 975 MG: 325 TABLET, FILM COATED ORAL at 23:47

## 2023-01-06 RX ADMIN — ONDANSETRON 4 MG: 4 TABLET, ORALLY DISINTEGRATING ORAL at 23:48

## 2023-01-06 RX ADMIN — IBUPROFEN 600 MG: 600 TABLET ORAL at 23:47

## 2023-01-06 ASSESSMENT — ACTIVITIES OF DAILY LIVING (ADL): ADLS_ACUITY_SCORE: 33

## 2023-01-07 ENCOUNTER — APPOINTMENT (OUTPATIENT)
Dept: CT IMAGING | Facility: CLINIC | Age: 33
End: 2023-01-07
Attending: EMERGENCY MEDICINE
Payer: COMMERCIAL

## 2023-01-07 ENCOUNTER — APPOINTMENT (OUTPATIENT)
Dept: GENERAL RADIOLOGY | Facility: CLINIC | Age: 33
End: 2023-01-07
Attending: EMERGENCY MEDICINE
Payer: COMMERCIAL

## 2023-01-07 VITALS
TEMPERATURE: 98.5 F | RESPIRATION RATE: 18 BRPM | BODY MASS INDEX: 21.41 KG/M2 | HEART RATE: 72 BPM | OXYGEN SATURATION: 99 % | SYSTOLIC BLOOD PRESSURE: 108 MMHG | DIASTOLIC BLOOD PRESSURE: 70 MMHG | WEIGHT: 145 LBS

## 2023-01-07 LAB
HCG UR QL: NEGATIVE
INTERNAL QC OK POCT: NORMAL
POCT KIT EXPIRATION DATE: NORMAL
POCT KIT LOT NUMBER: NORMAL

## 2023-01-07 PROCEDURE — 81025 URINE PREGNANCY TEST: CPT | Performed by: EMERGENCY MEDICINE

## 2023-01-07 PROCEDURE — 71101 X-RAY EXAM UNILAT RIBS/CHEST: CPT | Mod: LT

## 2023-01-07 PROCEDURE — 250N000011 HC RX IP 250 OP 636: Performed by: EMERGENCY MEDICINE

## 2023-01-07 PROCEDURE — 73562 X-RAY EXAM OF KNEE 3: CPT | Mod: LT

## 2023-01-07 PROCEDURE — 70450 CT HEAD/BRAIN W/O DYE: CPT

## 2023-01-07 RX ORDER — ONDANSETRON 4 MG/1
4 TABLET, ORALLY DISINTEGRATING ORAL ONCE
Status: COMPLETED | OUTPATIENT
Start: 2023-01-07 | End: 2023-01-07

## 2023-01-07 RX ADMIN — ONDANSETRON 4 MG: 4 TABLET, ORALLY DISINTEGRATING ORAL at 03:30

## 2023-01-07 ASSESSMENT — ACTIVITIES OF DAILY LIVING (ADL)
ADLS_ACUITY_SCORE: 35

## 2023-01-07 NOTE — ED TRIAGE NOTES
Triage Assessment     Row Name 01/06/23 2242       Triage Assessment (Adult)    Airway WDL WDL       Respiratory WDL    Respiratory WDL WDL       Skin Circulation/Temperature WDL    Skin Circulation/Temperature WDL X  redness to face, bruising to left leg around knee       Cardiac WDL    Cardiac WDL WDL       Peripheral/Neurovascular WDL    Peripheral Neurovascular WDL WDL       Cognitive/Neuro/Behavioral WDL    Cognitive/Neuro/Behavioral WDL WDL

## 2023-01-07 NOTE — DISCHARGE INSTRUCTIONS
Take ibuprofen 600 mg every 6 hours with food as needed for pain.    May also take doses of acetaminophen in between doses of ibuprofen for pain.      Follow-up with your primary care clinic in 1 week if ongoing symptoms.    Return if any concerns.

## 2023-01-07 NOTE — ED PROVIDER NOTES
"ED Provider Note  [facility]      History     Chief Complaint   Patient presents with     Fall     Patient reports around 1900 tonight she was trying to walk over a snow bank and fell forward, and her face hit the snow bank.  She then fell onto the sidewalk hurting her left knee and lower back; feeling more confused as time passes; vomited twice     HPI  Yuliana Licona is a 32 year old female who presents after she had a slip and fall with head strike after climbing a 4 foot snowbank and slipping and falling and striking her head her left knee and her left posterior back on the ice.  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  Given vomiting and feeling \"woozy\" came to emergency department for evaluation.   No pain medications taken.     Patient reports moderate pain especially on flexion of left knee.  Patient has pain on left posterior lateral mid back --both areas where she fell    Past Medical History  History reviewed. No pertinent past medical history.  Past Surgical History:   Procedure Laterality Date     APPENDECTOMY      07/18     LAPAROTOMY EXPLORATORY N/A 1/18/2018    Procedure: LAPAROTOMY EXPLORATORY;  Exploratory Laparotomy, Appendectomy;  Surgeon: Brianna Guthrie MD;  Location: [facility] OR     LAPAROTOMY EXPLORATORY Right 9/5/2020    Procedure: Right Mini Laparotomy with Right Salpingo-oophorectomy;  Surgeon: Sade Bergman MD;  Location: [facility] OR     acetaminophen (TYLENOL) 325 MG tablet  ibuprofen (ADVIL/MOTRIN) 600 MG tablet  oxyCODONE-acetaminophen (PERCOCET) 5-325 MG tablet  senna-docusate (SENOKOT-S/PERICOLACE) 8.6-50 MG tablet      No Known Allergies  Family History  Family History   Problem Relation Age of Onset     Cancer No family hx of         no skin cancer     Social History   Social History     Tobacco Use     Smoking status: Never     Smokeless tobacco: Never "   Substance Use Topics     Alcohol use: Yes     Comment: 3 drinks/day     Drug use: Yes     Types: Marijuana, Cocaine     Comment: reports regular marijuana use and rare cocaine use      Past medical history, past surgical history, medications, allergies, family history, and social history were reviewed with the patient. No additional pertinent items.      A medically appropriate review of systems was performed with pertinent positives and negatives noted in the HPI, and all other systems negative.    Physical Exam   BP: (!) 132/91  Pulse: 91  Temp: 98.5  F (36.9  C)  Resp: 16  Weight: 65.8 kg (145 lb)  SpO2: 100 %  Physical Exam  HENT:      Head:      Comments: No lacerations with a visible early ecchymosis on left forehead.  Nontender C-spine with supple neck  Eyes:      Pupils: Pupils are equal, round, and reactive to light.   Cardiovascular:      Rate and Rhythm: Regular rhythm.      Heart sounds: Normal heart sounds.   Pulmonary:      Effort: No respiratory distress.      Breath sounds: Normal breath sounds.   Chest:      Chest wall: No tenderness.   Abdominal:      Tenderness: There is no abdominal tenderness.   Musculoskeletal:      Cervical back: No tenderness.      Thoracic back: No tenderness.      Lumbar back: No tenderness.      Comments: Tenderness to palpation on the left anterior medial knee, pain upon flexion and extension actively.  Nontender left hip and left ankle distal pulses intact.  No laceration. + Ecchymosis visible.     Left posterior lateral back tenderness upon palpation with no crepitus.         Neurological:      Mental Status: She is oriented to person, place, and time.           ED Course, Procedures, & Data      Procedures              No results found for any visits on 01/06/23.  Medications   acetaminophen (TYLENOL) tablet 975 mg (975 mg Oral Given 1/6/23 2347)   ibuprofen (ADVIL/MOTRIN) tablet 600 mg (600 mg Oral Given 1/6/23 2347)   ondansetron (ZOFRAN ODT) ODT tab 4 mg (4 mg  Oral Given 1/6/23 9183)     Labs Ordered and Resulted from Time of ED Arrival to Time of ED Departure - No data to display  CT Head w/o Contrast    (Results Pending)   Ribs XR, unilat 3 views + PA chest,  left    (Results Pending)   XR Knee Left 3 Views    (Results Pending)          Medical Decision Making  The patient presented with a problem that is an acute illness with systemic symptoms.    The patient's evaluation involved:  ordering and review of 3+ test(s) (see separate area of note for details)    The patient's management involved prescription drug management and limitations due to social determinants of health.      Assessment & Plan    Rule out intracranial bleed or skull fracture given fall and 2 episodes of vomiting  -CT head, Tylenol, Zofran    Rule out left knee fracture or left posterior rib fractures  -X-rays as above and pain medication orally    I have reviewed the nursing notes. I have reviewed the findings, diagnosis, plan and need for follow up with the patient.    New Prescriptions    No medications on file       Final diagnoses:   Fall, initial encounter   Contusion of left knee, initial encounter       Mitchell Weinstein MD  [facility] EMERGENCY DEPARTMENT  1/6/2023     Mitchell Weinstein MD  01/07/23 0050

## 2023-01-07 NOTE — ED PROVIDER NOTES
Patient received as sign-out at change of shift.  Please see initial note for complete details.  32 year old female who presented to the ED with injury sustained in a fall.  Awaiting head CT, chest radiograph, and left knee radiograph  Acute events during this shift: none.  All imaging negative for acute traumatic pathology.  Discharged home  Ibuprofen recommended     Reggie Noland MD  01/07/23 0541

## 2023-08-09 ENCOUNTER — APPOINTMENT (OUTPATIENT)
Dept: CT IMAGING | Facility: CLINIC | Age: 33
End: 2023-08-09
Attending: FAMILY MEDICINE
Payer: COMMERCIAL

## 2023-08-09 ENCOUNTER — HOSPITAL ENCOUNTER (EMERGENCY)
Facility: CLINIC | Age: 33
Discharge: HOME | End: 2023-08-09
Attending: FAMILY MEDICINE | Admitting: FAMILY MEDICINE
Payer: COMMERCIAL

## 2023-08-09 VITALS
TEMPERATURE: 98.6 F | BODY MASS INDEX: 23.92 KG/M2 | SYSTOLIC BLOOD PRESSURE: 123 MMHG | HEART RATE: 95 BPM | RESPIRATION RATE: 16 BRPM | OXYGEN SATURATION: 97 % | WEIGHT: 162 LBS | DIASTOLIC BLOOD PRESSURE: 82 MMHG

## 2023-08-09 DIAGNOSIS — H57.12 LEFT EYE PAIN: ICD-10-CM

## 2023-08-09 LAB
ANION GAP SERPL CALCULATED.3IONS-SCNC: 14 MMOL/L (ref 7–15)
BASOPHILS # BLD AUTO: 0 10E3/UL (ref 0–0.2)
BASOPHILS NFR BLD AUTO: 1 %
BUN SERPL-MCNC: 8.3 MG/DL (ref 6–20)
CALCIUM SERPL-MCNC: 9.4 MG/DL (ref 8.6–10)
CHLORIDE SERPL-SCNC: 103 MMOL/L (ref 98–107)
CREAT SERPL-MCNC: 0.63 MG/DL (ref 0.51–0.95)
CRP SERPL-MCNC: <3 MG/L
DEPRECATED HCO3 PLAS-SCNC: 26 MMOL/L (ref 22–29)
EOSINOPHIL # BLD AUTO: 0 10E3/UL (ref 0–0.7)
EOSINOPHIL NFR BLD AUTO: 1 %
ERYTHROCYTE [DISTWIDTH] IN BLOOD BY AUTOMATED COUNT: 12.7 % (ref 10–15)
ERYTHROCYTE [SEDIMENTATION RATE] IN BLOOD BY WESTERGREN METHOD: 6 MM/HR (ref 0–20)
GFR SERPL CREATININE-BSD FRML MDRD: >90 ML/MIN/1.73M2
GLUCOSE SERPL-MCNC: 91 MG/DL (ref 70–99)
HCG SERPL QL: NEGATIVE
HCT VFR BLD AUTO: 41.5 % (ref 35–47)
HGB BLD-MCNC: 14.2 G/DL (ref 11.7–15.7)
HOLD SPECIMEN: NORMAL
HOLD SPECIMEN: NORMAL
IMM GRANULOCYTES # BLD: 0 10E3/UL
IMM GRANULOCYTES NFR BLD: 0 %
LYMPHOCYTES # BLD AUTO: 1.5 10E3/UL (ref 0.8–5.3)
LYMPHOCYTES NFR BLD AUTO: 22 %
MCH RBC QN AUTO: 33.4 PG (ref 26.5–33)
MCHC RBC AUTO-ENTMCNC: 34.2 G/DL (ref 31.5–36.5)
MCV RBC AUTO: 98 FL (ref 78–100)
MONOCYTES # BLD AUTO: 0.3 10E3/UL (ref 0–1.3)
MONOCYTES NFR BLD AUTO: 5 %
NEUTROPHILS # BLD AUTO: 4.7 10E3/UL (ref 1.6–8.3)
NEUTROPHILS NFR BLD AUTO: 71 %
NRBC # BLD AUTO: 0 10E3/UL
NRBC BLD AUTO-RTO: 0 /100
PLATELET # BLD AUTO: 212 10E3/UL (ref 150–450)
POTASSIUM SERPL-SCNC: 4.2 MMOL/L (ref 3.4–5.3)
RBC # BLD AUTO: 4.25 10E6/UL (ref 3.8–5.2)
SODIUM SERPL-SCNC: 143 MMOL/L (ref 136–145)
WBC # BLD AUTO: 6.6 10E3/UL (ref 4–11)

## 2023-08-09 PROCEDURE — 85652 RBC SED RATE AUTOMATED: CPT | Performed by: FAMILY MEDICINE

## 2023-08-09 PROCEDURE — 96374 THER/PROPH/DIAG INJ IV PUSH: CPT | Performed by: FAMILY MEDICINE

## 2023-08-09 PROCEDURE — 82310 ASSAY OF CALCIUM: CPT | Performed by: FAMILY MEDICINE

## 2023-08-09 PROCEDURE — 99285 EMERGENCY DEPT VISIT HI MDM: CPT | Mod: 25 | Performed by: FAMILY MEDICINE

## 2023-08-09 PROCEDURE — 250N000009 HC RX 250: Performed by: FAMILY MEDICINE

## 2023-08-09 PROCEDURE — 96361 HYDRATE IV INFUSION ADD-ON: CPT | Performed by: FAMILY MEDICINE

## 2023-08-09 PROCEDURE — 86140 C-REACTIVE PROTEIN: CPT | Performed by: FAMILY MEDICINE

## 2023-08-09 PROCEDURE — 99284 EMERGENCY DEPT VISIT MOD MDM: CPT | Mod: GC | Performed by: FAMILY MEDICINE

## 2023-08-09 PROCEDURE — 36415 COLL VENOUS BLD VENIPUNCTURE: CPT | Performed by: FAMILY MEDICINE

## 2023-08-09 PROCEDURE — 250N000011 HC RX IP 250 OP 636: Mod: JZ | Performed by: FAMILY MEDICINE

## 2023-08-09 PROCEDURE — 84703 CHORIONIC GONADOTROPIN ASSAY: CPT | Performed by: FAMILY MEDICINE

## 2023-08-09 PROCEDURE — 258N000003 HC RX IP 258 OP 636: Performed by: FAMILY MEDICINE

## 2023-08-09 PROCEDURE — 85025 COMPLETE CBC W/AUTO DIFF WBC: CPT | Performed by: FAMILY MEDICINE

## 2023-08-09 RX ORDER — ERYTHROMYCIN 5 MG/G
0.5 OINTMENT OPHTHALMIC AT BEDTIME
Qty: 1 G | Refills: 0 | Status: SHIPPED | OUTPATIENT
Start: 2023-08-09 | End: 2023-08-21

## 2023-08-09 RX ORDER — ONDANSETRON 2 MG/ML
4 INJECTION INTRAMUSCULAR; INTRAVENOUS ONCE
Status: COMPLETED | OUTPATIENT
Start: 2023-08-09 | End: 2023-08-09

## 2023-08-09 RX ORDER — ARTIFICIAL TEARS 1; 2; 3 MG/ML; MG/ML; MG/ML
SOLUTION/ DROPS OPHTHALMIC
Qty: 15 ML | Refills: 0 | Status: SHIPPED | OUTPATIENT
Start: 2023-08-09 | End: 2023-08-21

## 2023-08-09 RX ORDER — MOXIFLOXACIN 5 MG/ML
1 SOLUTION/ DROPS OPHTHALMIC 4 TIMES DAILY
Qty: 3 ML | Refills: 0 | Status: SHIPPED | OUTPATIENT
Start: 2023-08-09 | End: 2023-08-21

## 2023-08-09 RX ORDER — PROPARACAINE HYDROCHLORIDE 5 MG/ML
1 SOLUTION/ DROPS OPHTHALMIC ONCE
Status: COMPLETED | OUTPATIENT
Start: 2023-08-09 | End: 2023-08-09

## 2023-08-09 RX ADMIN — FLUORESCEIN SODIUM 1 STRIP: 1 STRIP OPHTHALMIC at 14:12

## 2023-08-09 RX ADMIN — PROPARACAINE HYDROCHLORIDE 1 DROP: 5 SOLUTION/ DROPS OPHTHALMIC at 14:09

## 2023-08-09 RX ADMIN — ONDANSETRON 4 MG: 2 INJECTION INTRAMUSCULAR; INTRAVENOUS at 15:22

## 2023-08-09 RX ADMIN — SODIUM CHLORIDE 1000 ML: 9 INJECTION, SOLUTION INTRAVENOUS at 15:23

## 2023-08-09 ASSESSMENT — VISUAL ACUITY
OD: 20/50;WITHOUT CORRECTIVE LENSES
OS: 20/200;WITHOUT CORRECTIVE LENSES

## 2023-08-09 ASSESSMENT — ACTIVITIES OF DAILY LIVING (ADL): ADLS_ACUITY_SCORE: 33

## 2023-08-09 NOTE — CONSULTS
OPHTHALMOLOGY CONSULT NOTE  08/09/23    Patient: Yuliana Licona      ASSESSMENT/PLAN:     Yuliana Licona is a 33 year old female who presented with left eye visual changes accompanied by redness of the periorbital area.     Dry Eyes, Bilateral  See HPI. States that this has been happening for approximately 3 days. Started with some numbness and tingling and redness with bug bites noticed overnight on bilateral sides of face. On exam, the periorbital region does not appear to be involved in any rash concerning for a dewdrops on a cali petal appearance which would be indicative of herpesviridae reactivation. No obvious swelling but some mild irritation and redness in the periorbital area consistent with eyelid rubbing. No signs of Medina sign. In all, only definitively abnormal exam findings are consistent with severe bilateral dry eye. Discussed with patient that a diagnosis of dry eye is without question but that there may be some component of a viral reactivation syndrome that has not yet fully manifested, given skin findings,  Patient elected to forego viral treatment at this time and to be seen in close follow up in clinic in case her symptoms worsen. ESR/CRP wnl  - Vigamox four times a day, left eye  - preservative free artificial tears four times per day, both eyes  - Erythromycin ophthalmic ointment at night, left eye  - counseled return/RD precations  - Follow up in ophthalmology clinic Friday or Monday    [facility], 9th Floor  [address] [place] [id number] [phone]    It is our pleasure to participate in this patient's care and treatment. Please contact us with any further questions or concerns.    Discussed with Dr. Clemente who agreed with this assessment and plan.     Ophthalmology will follow up tomorrow with appointment times. Please contact ophthalmologist on call with any questions or concerns. We appreciate your care of this patient.    Ryan Mejia,  MD, PGY2  Ophthalmology Resident  [facility]    HISTORY OF PRESENTING ILLNESS:     Yuliana Licona is a 33 year old female who presented on 08/09/23   with eye pain and periorbital erythema.    Initially began Monday 08/07 with two major 'bug bites' and one small red fanta on the other side of the forehead and felt dizzy  Redness and swelling around the left eye has gotten worse since  Had also had some numbness/tingling in the left side of her face  Had chickenpox as a child  Burning pain in the left eye since Monday, feels like it is getting worse  Was crusted together on awaking this AM, some tearing otherwise  Denies any recent changes in vision but feels that her vsion in general has been worse at night with oncoming lights in the past several months  Denies any history of trauma  Felt some irritation like a foreign body last week but nothing since and feels like she got it out right away  No other medical problems, no allergies  Does not take any medications    10+ review of systems were otherwise negative except for that which has been stated above.    OCULAR/MEDICAL/SURGICAL HISTORIES:     Past Ocular History:   Last eye exam: None     Pertinent Systemic Medications:   None    Past Medical History:  No past medical history on file.    Past Surgical History:   Past Surgical History:   Procedure Laterality Date    APPENDECTOMY      07/18    LAPAROTOMY EXPLORATORY N/A 1/18/2018    Procedure: LAPAROTOMY EXPLORATORY;  Exploratory Laparotomy, Appendectomy;  Surgeon: Brianna Guthrie MD;  Location: [facility] OR    LAPAROTOMY EXPLORATORY Right 9/5/2020    Procedure: Right Mini Laparotomy with Right Salpingo-oophorectomy;  Surgeon: Sade Bergman MD;  Location:  OR       EXAMINATION:     Base Eye Exam       Visual Acuity (Snellen - Linear)         Right Left    Near sc J3 -2 J4 -2              Tonometry (Tonopen, 3:44 PM)         Right Left    Pressure 15 11              Pupils         Pupils Dark  Light Shape React APD    Right PERRL 6 4 Round Brisk None    Left PERRL 6 4 Round Brisk None              Visual Fields         Left Right     Full Full              Extraocular Movement         Right Left     Full, Ortho Full, Ortho              Dilation       Both eyes: 1% Cyclopentolate/1% Tropicamide/2.5% Phenylephrine, 1.0% Mydriacyl @ 3:45 PM                  Slit Lamp and Fundus Exam       External Exam         Right Left    External Normal Normal              Slit Lamp Exam         Right Left    Lids/Lashes Normal Normal    Conjunctiva/Sclera White and quiet White and quiet    Cornea Severe PEE inferiorly; no dendrites or pseudodendrites, no abrasions or ulcerations Severe PEE inferiorly; no dendrites or pseudodendrites; no abrasions or ulcerations    Anterior Chamber Deep and quiet; no cell or flare Deep and quiet; no cell or flare    Iris Round and reactive Round and reactive    Lens Clear Clear    Anterior Vitreous Normal Normal              Fundus Exam         Right Left    Disc Normal Normal    C/D Ratio 0.3 0.3    Macula Normal Normal    Vessels Normal Normal    Periphery Normal Normal                    Ryan Mejia MD  Resident Physician, PGY2  Department of Ophthalmology  08/09/23 3:50 PM

## 2023-08-09 NOTE — ED PROVIDER NOTES
ED Provider Note  [facility]      History     Chief Complaint   Patient presents with    Facial Swelling     A few days ago she saw 2-3 marks on her face that looked like insect bite marks, a few days after she developed numbness to left side of face that is spreading into the right side     HPI  Yuliana Licona is a 33 year old female who presents with left eye pain, blurred vision in the left eye, and left facial swelling. Pt states she woke up with swelling and numbness under her left eye 3 days ago and her symptoms have progressed since then to include her left eyelid. She worked the previous two days before the onset and did not go hiking or think she had bug bites. Tylenol helped minimally with pain. She presented to  who was concerned about a bacterial or viral infection and thus she was transferred here.  Pt has no pain with eye movement and no trouble swallowing. She reports blurriness and difficulty seeing out of her left eye. She endorses a sore throat and chills.     Past Medical History  No past medical history on file.  Past Surgical History:   Procedure Laterality Date    APPENDECTOMY      07/18    LAPAROTOMY EXPLORATORY N/A 1/18/2018    Procedure: LAPAROTOMY EXPLORATORY;  Exploratory Laparotomy, Appendectomy;  Surgeon: Brianna Guthrie MD;  Location: [facility] OR    LAPAROTOMY EXPLORATORY Right 9/5/2020    Procedure: Right Mini Laparotomy with Right Salpingo-oophorectomy;  Surgeon: Sade Bergman MD;  Location: [facility] OR     acetaminophen (TYLENOL) 325 MG tablet  ibuprofen (ADVIL/MOTRIN) 600 MG tablet  oxyCODONE-acetaminophen (PERCOCET) 5-325 MG tablet  senna-docusate (SENOKOT-S/PERICOLACE) 8.6-50 MG tablet      No Known Allergies  Family History  Family History   Problem Relation Age of Onset    Cancer No family hx of         no skin cancer     Social History   Social History     Tobacco Use    Smoking status: Never    Smokeless tobacco: Never   Substance Use Topics     Alcohol use: Yes     Comment: 3 drinks/day    Drug use: Yes     Types: Marijuana, Cocaine     Comment: reports regular marijuana use and rare cocaine use      Past medical history, past surgical history, medications, allergies, family history, and social history were reviewed with the patient. No additional pertinent items.      A medically appropriate review of systems was performed with pertinent positives and negatives noted in the HPI, and all other systems negative.    Physical Exam   BP: (!) 132/93  Pulse: 104  Temp: 98.6  F (37  C)  Resp: 16  Weight: 73.5 kg (162 lb)  SpO2: 97 %  Physical Exam  General: In no apparent acute distress  HEENT: L facial and eyelid swelling with mild erythema. There is a circumscribal erythemic raised lesion below her left eyelid. (See picture below)  -Left eye: pupil reacts sluggishly to light, asymmetric pupillary reflex. Conjunctival injection. Intact occulomotor        -Woods light with dye did not show abrasion  Visual Acuity:   - Right 20/50  - Left 20/200  Tonometry showed pressure WNL  CV: Heart RRR, no rubs, no murmurs  Pulm: Clear and equal in posterior lung fields   Abdomen: soft, nontender, nondistended  Neuro: CN II-XII intact          ED Course, Procedures, & Data      Procedures       ED Course Selections: A consult was attained from the Opthalmology service.  Initial evaluation, recommended CT of head and orbits with and without contrast.        Results for orders placed or performed during the hospital encounter of 08/09/23   CRP inflammation     Status: Normal   Result Value Ref Range    CRP Inflammation <3.00 <5.00 mg/L   Erythrocyte sedimentation rate auto     Status: Normal   Result Value Ref Range    Erythrocyte Sedimentation Rate 6 0 - 20 mm/hr   Basic metabolic panel     Status: Normal   Result Value Ref Range    Sodium 143 136 - 145 mmol/L    Potassium 4.2 3.4 - 5.3 mmol/L    Chloride 103 98 - 107 mmol/L    Carbon Dioxide (CO2) 26 22 - 29 mmol/L    Anion  Gap 14 7 - 15 mmol/L    Urea Nitrogen 8.3 6.0 - 20.0 mg/dL    Creatinine 0.63 0.51 - 0.95 mg/dL    Calcium 9.4 8.6 - 10.0 mg/dL    Glucose 91 70 - 99 mg/dL    GFR Estimate >90 >60 mL/min/1.73m2   CBC with platelets and differential     Status: Abnormal   Result Value Ref Range    WBC Count 6.6 4.0 - 11.0 10e3/uL    RBC Count 4.25 3.80 - 5.20 10e6/uL    Hemoglobin 14.2 11.7 - 15.7 g/dL    Hematocrit 41.5 35.0 - 47.0 %    MCV 98 78 - 100 fL    MCH 33.4 (H) 26.5 - 33.0 pg    MCHC 34.2 31.5 - 36.5 g/dL    RDW 12.7 10.0 - 15.0 %    Platelet Count 212 150 - 450 10e3/uL    % Neutrophils 71 %    % Lymphocytes 22 %    % Monocytes 5 %    % Eosinophils 1 %    % Basophils 1 %    % Immature Granulocytes 0 %    NRBCs per 100 WBC 0 <1 /100    Absolute Neutrophils 4.7 1.6 - 8.3 10e3/uL    Absolute Lymphocytes 1.5 0.8 - 5.3 10e3/uL    Absolute Monocytes 0.3 0.0 - 1.3 10e3/uL    Absolute Eosinophils 0.0 0.0 - 0.7 10e3/uL    Absolute Basophils 0.0 0.0 - 0.2 10e3/uL    Absolute Immature Granulocytes 0.0 <=0.4 10e3/uL    Absolute NRBCs 0.0 10e3/uL   [place] Draw     Status: None (In process)    Narrative    The following orders were created for panel order [place] Draw.  Procedure                               Abnormality         Status                     ---------                               -----------         ------                     Extra Blue Top Tube[[id number]]                              In process                 Extra Red Top Tube[[id number]]                               In process                   Please view results for these tests on the individual orders.   HCG qualitative Blood     Status: Normal   Result Value Ref Range    hCG Serum Qualitative Negative Negative   CBC with platelets differential     Status: Abnormal    Narrative    The following orders were created for panel order CBC with platelets differential.  Procedure                               Abnormality         Status                     ---------                                -----------         ------                     CBC with platelets and d...[[id number]]  Abnormal            Final result                 Please view results for these tests on the individual orders.     Medications   0.9% sodium chloride BOLUS (1,000 mLs Intravenous $New Bag 8/9/23 1523)   proparacaine (ALCAINE) 0.5 % ophthalmic solution 1 drop (1 drop Both Eyes $Given 8/9/23 1409)   fluorescein (FUL-SONALI) ophthalmic strip 1 strip (1 strip Both Eyes $Given 8/9/23 1412)   ondansetron (ZOFRAN) injection 4 mg (4 mg Intravenous $Given 8/9/23 1522)     Labs Ordered and Resulted from Time of ED Arrival to Time of ED Departure   CBC WITH PLATELETS AND DIFFERENTIAL - Abnormal       Result Value    WBC Count 6.6      RBC Count 4.25      Hemoglobin 14.2      Hematocrit 41.5      MCV 98      MCH 33.4 (*)     MCHC 34.2      RDW 12.7      Platelet Count 212      % Neutrophils 71      % Lymphocytes 22      % Monocytes 5      % Eosinophils 1      % Basophils 1      % Immature Granulocytes 0      NRBCs per 100 WBC 0      Absolute Neutrophils 4.7      Absolute Lymphocytes 1.5      Absolute Monocytes 0.3      Absolute Eosinophils 0.0      Absolute Basophils 0.0      Absolute Immature Granulocytes 0.0      Absolute NRBCs 0.0     CRP INFLAMMATION - Normal    CRP Inflammation <3.00     ERYTHROCYTE SEDIMENTATION RATE AUTO - Normal    Erythrocyte Sedimentation Rate 6     BASIC METABOLIC PANEL - Normal    Sodium 143      Potassium 4.2      Chloride 103      Carbon Dioxide (CO2) 26      Anion Gap 14      Urea Nitrogen 8.3      Creatinine 0.63      Calcium 9.4      Glucose 91      GFR Estimate >90     HCG QUALITATIVE PREGNANCY - Normal    hCG Serum Qualitative Negative       Head CT w/o contrast    (Results Pending)   CT Orbits wo & w Contrast    (Results Pending)              Assessment & Plan    #Left facial swelling  Pt here with worsening left facial pain, swelling, and numbness. Vitally stable on arrival. Ddx  includes Shingles, preseptal/orbital cellulitis, uveitis, corneal abrasion. Wood's light with dye did not show evidence of abrasion. Pt does not report pain with occulomotor movements, making orbital cellulitis unlikely. Preseptal cellulitis a possibility given swelling and erythema although pt is afebrile and has no history that would suggest skin lesions such as bites, scratches, etc. Appearance of lesion could signify start VZV reactivation. Tonometry showed normal intraocular pressure. Opthalmology consulted and basic labs obtained.  Labs do not show significant elevation of inflammatory markers.  Ophthalmology recommends CT brain and orbits which is pending at the time of this dictation.    I have reviewed the nursing notes. I have reviewed the findings, diagnosis, plan and need for follow up with the patient.    New Prescriptions    No medications on file       Final diagnoses:   Left eye pain       Rahul Woodard  MS4  [facility] EMERGENCY DEPARTMENT  8/9/2023  --    ED Attending Physician Attestation    I Nicholas Barclay MD, cared for this patient with the Medical Student. I performed, or re-performed, the physical exam and medical decision-making. I have verified the accuracy of all the medical student findings and documentation above, and have edited as necessary.    Summary of HPI, PE, ED Course   Patient is a 33 year old female evaluated in the emergency department for patient who woke up 2 days ago with 2 small red painful marks on her left malar prominence and left temple associated with left eye pain, blurriness, and paresthesia of the left cheek.  She went to an urgent care and was referred here to the ED.  She denied known trauma, had assumed that the small lesions were related to an insect bite but did not see any insect and has no other bites.  Has not had a fever or chills but did experience a mild sore throat.  She does not wear contact lenses and has not placed any drops or  medications into her eyes.  She does not have a a headache, stiff neck, or any other neurologic symptoms or rash..   Initial differential diagnosis includes preseptal cellulitis, orbital cellulitis, herpes zoster, endophthalmitis, dry eyes, acute viral conjunctivitis, bacterial conjunctivitis, allergic conjunctivitis, irritant conjunctivitis, keratitis, iritis, angle closure glaucoma, foreign body, corneal abrasion.  Exam and ED course notable for lesions on left cheek and left temple as documented in the photo below.  The left conjunctiva is diffusely mildly injected.  The left pupil is slightly more sluggish and reacting to light and she has significant photophobia which is not relieved with topical anesthetic.  Intraocular pressure with a Denilson-Pen was 14, and there was no areas of fluorescein uptake noted on the cornea.  Visual acuity is 20/50 on the right and 20/200 on the affected side.  This patient  has some red flag symptoms with her left eye decreased visual acuity,  photophobia/ pupillary asymmetry, so that is the reason for ophthalmology consult. We consulted ophthalmology, they are here seeing the patient in the ED and recommended a CT of the head, and a CT of orbits with and without contrast.  Patient had basic labs including CRP and sed rate, white blood cell count and electrolyte panel which are normal.  Patient is being signed out at shift change to Dr. Lubin, with the remainder of the workup including review of imaging and final recommendations of ophthalmology.    Critical Care & Procedures  Not applicable.        Medical Decision Making  The patient's presentation was of moderate complexity (an undiagnosed new problem with uncertain diagnosis).    The patient's evaluation involved:  ordering and/or review of 3+ test(s) in this encounter (see separate area of note for details)  discussion of management or test interpretation with another health professional (ophthalmology consult)    The patient's  management necessitated moderate risk (prescription drug management including medications given in the ED) and further care after sign-out to Dr. Lubin (see their note for further management).          Nicholas Barclay MD  Emergency Medicine        Nicholas Barclay MD  08/09/23 [id number]

## 2023-08-09 NOTE — ED PROVIDER NOTES
Patient received in signout from Dr. Barclay. See his note for further documentation.  In short, patient presents to the ED with concern for left-sided eye pain.  Was noted to have visual acuity deficit on the left.  Initial concern for dry eyes versus zoster ophthalmicus versus facial cellulitis.  Case discussed with ophthalmology who evaluated patient in the ED.  Initial concern for severe dry eye syndrome, although,/ophthalmicus remains a possibility.  No indication for CT or other imaging.  They recommend discharge with Vigamox, erythromycin ointment at night, and artificial tears.  They will arrange close follow-up.  Can transition to antiviral care if symptoms do not improve.     Raul Lubin,   08/09/23 [id number]

## 2023-08-09 NOTE — ED TRIAGE NOTES
Triage Assessment       Row Name 08/09/23 132       Triage Assessment (Adult)    Airway WDL WDL       Respiratory WDL    Respiratory WDL WDL       Skin Circulation/Temperature WDL    Skin Circulation/Temperature WDL X  redness to left face       Cardiac WDL    Cardiac WDL WDL       Peripheral/Neurovascular WDL    Peripheral Neurovascular WDL WDL       Cognitive/Neuro/Behavioral WDL    Cognitive/Neuro/Behavioral WDL WDL

## 2023-08-10 ENCOUNTER — TELEPHONE (OUTPATIENT)
Dept: OPHTHALMOLOGY | Facility: CLINIC | Age: 33
End: 2023-08-10
Payer: COMMERCIAL

## 2023-08-10 NOTE — TELEPHONE ENCOUNTER
Called and spoke to Yuliana     Made her an appointment for 8/14 @ 11 am with Dr. Dawson Bridges is going out of town on 8/11    Discussed     Billing, clinic address , wait time     Padmini Carr Communication Facilitator on 8/10/2023 at 9:17 AM

## 2023-08-21 ENCOUNTER — HOSPITAL ENCOUNTER (OUTPATIENT)
Facility: CLINIC | Age: 33
Setting detail: OBSERVATION
Discharge: HOME | End: 2023-08-22
Attending: EMERGENCY MEDICINE | Admitting: PEDIATRICS
Payer: COMMERCIAL

## 2023-08-21 ENCOUNTER — APPOINTMENT (OUTPATIENT)
Dept: ULTRASOUND IMAGING | Facility: CLINIC | Age: 33
End: 2023-08-21
Attending: EMERGENCY MEDICINE
Payer: COMMERCIAL

## 2023-08-21 ENCOUNTER — APPOINTMENT (OUTPATIENT)
Dept: CT IMAGING | Facility: CLINIC | Age: 33
End: 2023-08-21
Attending: EMERGENCY MEDICINE
Payer: COMMERCIAL

## 2023-08-21 DIAGNOSIS — K76.0 NAFLD (NONALCOHOLIC FATTY LIVER DISEASE): Primary | ICD-10-CM

## 2023-08-21 DIAGNOSIS — D69.6 LOW PLATELET COUNT (H): ICD-10-CM

## 2023-08-21 DIAGNOSIS — R79.89 LFT ELEVATION: ICD-10-CM

## 2023-08-21 DIAGNOSIS — R10.2 PELVIC PAIN: ICD-10-CM

## 2023-08-21 DIAGNOSIS — R10.30 LOWER ABDOMINAL PAIN: ICD-10-CM

## 2023-08-21 DIAGNOSIS — R79.89 ELEVATED LACTIC ACID LEVEL: ICD-10-CM

## 2023-08-21 DIAGNOSIS — E87.29 INCREASED ANION GAP METABOLIC ACIDOSIS: ICD-10-CM

## 2023-08-21 LAB
ALBUMIN SERPL BCG-MCNC: 4.2 G/DL (ref 3.5–5.2)
ALBUMIN SERPL BCG-MCNC: 4.3 G/DL (ref 3.5–5.2)
ALBUMIN SERPL BCG-MCNC: 4.4 G/DL (ref 3.5–5.2)
ALBUMIN SERPL BCG-MCNC: 4.6 G/DL (ref 3.5–5.2)
ALBUMIN UR-MCNC: NEGATIVE MG/DL
ALP SERPL-CCNC: 59 U/L (ref 35–104)
ALP SERPL-CCNC: 62 U/L (ref 35–104)
ALP SERPL-CCNC: 62 U/L (ref 35–104)
ALP SERPL-CCNC: 66 U/L (ref 35–104)
ALT SERPL W P-5'-P-CCNC: 51 U/L (ref 0–50)
ALT SERPL W P-5'-P-CCNC: 52 U/L (ref 0–50)
ALT SERPL W P-5'-P-CCNC: 55 U/L (ref 0–50)
ALT SERPL W P-5'-P-CCNC: 56 U/L (ref 0–50)
ANION GAP SERPL CALCULATED.3IONS-SCNC: 14 MMOL/L (ref 7–15)
ANION GAP SERPL CALCULATED.3IONS-SCNC: 16 MMOL/L (ref 7–15)
ANION GAP SERPL CALCULATED.3IONS-SCNC: 18 MMOL/L (ref 7–15)
APPEARANCE UR: CLEAR
AST SERPL W P-5'-P-CCNC: 116 U/L (ref 0–45)
AST SERPL W P-5'-P-CCNC: 119 U/L (ref 0–45)
AST SERPL W P-5'-P-CCNC: 133 U/L (ref 0–45)
AST SERPL W P-5'-P-CCNC: 136 U/L (ref 0–45)
BASOPHILS # BLD AUTO: 0 10E3/UL (ref 0–0.2)
BASOPHILS # BLD AUTO: 0 10E3/UL (ref 0–0.2)
BASOPHILS NFR BLD AUTO: 0 %
BASOPHILS NFR BLD AUTO: 1 %
BILIRUB DIRECT SERPL-MCNC: <0.2 MG/DL (ref 0–0.3)
BILIRUB SERPL-MCNC: 0.4 MG/DL
BILIRUB SERPL-MCNC: 0.4 MG/DL
BILIRUB SERPL-MCNC: 0.8 MG/DL
BILIRUB SERPL-MCNC: 1.2 MG/DL
BILIRUB UR QL STRIP: NEGATIVE
BUN SERPL-MCNC: 3.1 MG/DL (ref 6–20)
BUN SERPL-MCNC: 3.8 MG/DL (ref 6–20)
BUN SERPL-MCNC: 6.3 MG/DL (ref 6–20)
C TRACH DNA SPEC QL NAA+PROBE: NEGATIVE
CALCIUM SERPL-MCNC: 8.4 MG/DL (ref 8.6–10)
CALCIUM SERPL-MCNC: 8.9 MG/DL (ref 8.6–10)
CALCIUM SERPL-MCNC: 8.9 MG/DL (ref 8.6–10)
CHLORIDE SERPL-SCNC: 102 MMOL/L (ref 98–107)
CHLORIDE SERPL-SCNC: 98 MMOL/L (ref 98–107)
CHLORIDE SERPL-SCNC: 99 MMOL/L (ref 98–107)
CLUE CELLS: ABNORMAL
COLOR UR AUTO: ABNORMAL
CREAT SERPL-MCNC: 0.42 MG/DL (ref 0.51–0.95)
CREAT SERPL-MCNC: 0.47 MG/DL (ref 0.51–0.95)
CREAT SERPL-MCNC: 0.51 MG/DL (ref 0.51–0.95)
CRP SERPL-MCNC: <3 MG/L
DEPRECATED HCO3 PLAS-SCNC: 20 MMOL/L (ref 22–29)
DEPRECATED HCO3 PLAS-SCNC: 22 MMOL/L (ref 22–29)
DEPRECATED HCO3 PLAS-SCNC: 26 MMOL/L (ref 22–29)
EOSINOPHIL # BLD AUTO: 0 10E3/UL (ref 0–0.7)
EOSINOPHIL # BLD AUTO: 0.1 10E3/UL (ref 0–0.7)
EOSINOPHIL NFR BLD AUTO: 1 %
EOSINOPHIL NFR BLD AUTO: 4 %
ERYTHROCYTE [DISTWIDTH] IN BLOOD BY AUTOMATED COUNT: 12.9 % (ref 10–15)
ERYTHROCYTE [DISTWIDTH] IN BLOOD BY AUTOMATED COUNT: 12.9 % (ref 10–15)
ERYTHROCYTE [SEDIMENTATION RATE] IN BLOOD BY WESTERGREN METHOD: 5 MM/HR (ref 0–20)
ETHANOL SERPL-MCNC: <0.01 G/DL
GFR SERPL CREATININE-BSD FRML MDRD: >90 ML/MIN/1.73M2
GLUCOSE SERPL-MCNC: 123 MG/DL (ref 70–99)
GLUCOSE SERPL-MCNC: 88 MG/DL (ref 70–99)
GLUCOSE SERPL-MCNC: 94 MG/DL (ref 70–99)
GLUCOSE UR STRIP-MCNC: NEGATIVE MG/DL
HCG SERPL QL: NEGATIVE
HCT VFR BLD AUTO: 36.8 % (ref 35–47)
HCT VFR BLD AUTO: 37 % (ref 35–47)
HGB BLD-MCNC: 12.7 G/DL (ref 11.7–15.7)
HGB BLD-MCNC: 12.9 G/DL (ref 11.7–15.7)
HGB UR QL STRIP: NEGATIVE
HOLD SPECIMEN: NORMAL
IMM GRANULOCYTES # BLD: 0 10E3/UL
IMM GRANULOCYTES # BLD: 0 10E3/UL
IMM GRANULOCYTES NFR BLD: 0 %
IMM GRANULOCYTES NFR BLD: 1 %
INR PPP: 1.09 (ref 0.85–1.15)
KETONES UR STRIP-MCNC: ABNORMAL MG/DL
LACTATE SERPL-SCNC: 0.9 MMOL/L (ref 0.7–2)
LACTATE SERPL-SCNC: 3 MMOL/L (ref 0.7–2)
LACTATE SERPL-SCNC: 3.1 MMOL/L (ref 0.7–2)
LACTATE SERPL-SCNC: 3.3 MMOL/L (ref 0.7–2)
LACTATE SERPL-SCNC: 4 MMOL/L (ref 0.7–2)
LEUKOCYTE ESTERASE UR QL STRIP: NEGATIVE
LIPASE SERPL-CCNC: 33 U/L (ref 13–60)
LYMPHOCYTES # BLD AUTO: 0.6 10E3/UL (ref 0.8–5.3)
LYMPHOCYTES # BLD AUTO: 1.2 10E3/UL (ref 0.8–5.3)
LYMPHOCYTES NFR BLD AUTO: 16 %
LYMPHOCYTES NFR BLD AUTO: 40 %
MCH RBC QN AUTO: 33.6 PG (ref 26.5–33)
MCH RBC QN AUTO: 34.6 PG (ref 26.5–33)
MCHC RBC AUTO-ENTMCNC: 34.3 G/DL (ref 31.5–36.5)
MCHC RBC AUTO-ENTMCNC: 35.1 G/DL (ref 31.5–36.5)
MCV RBC AUTO: 98 FL (ref 78–100)
MCV RBC AUTO: 99 FL (ref 78–100)
MONOCYTES # BLD AUTO: 0.3 10E3/UL (ref 0–1.3)
MONOCYTES # BLD AUTO: 0.3 10E3/UL (ref 0–1.3)
MONOCYTES NFR BLD AUTO: 10 %
MONOCYTES NFR BLD AUTO: 9 %
MONOCYTES NFR BLD AUTO: NEGATIVE %
MUCOUS THREADS #/AREA URNS LPF: PRESENT /LPF
N GONORRHOEA DNA SPEC QL NAA+PROBE: NEGATIVE
NEUTROPHILS # BLD AUTO: 1.3 10E3/UL (ref 1.6–8.3)
NEUTROPHILS # BLD AUTO: 3 10E3/UL (ref 1.6–8.3)
NEUTROPHILS NFR BLD AUTO: 45 %
NEUTROPHILS NFR BLD AUTO: 73 %
NITRATE UR QL: NEGATIVE
NRBC # BLD AUTO: 0 10E3/UL
NRBC # BLD AUTO: 0 10E3/UL
NRBC BLD AUTO-RTO: 0 /100
NRBC BLD AUTO-RTO: 0 /100
PH UR STRIP: 6.5 [PH] (ref 5–7)
PLATELET # BLD AUTO: 100 10E3/UL (ref 150–450)
PLATELET # BLD AUTO: 89 10E3/UL (ref 150–450)
POTASSIUM SERPL-SCNC: 3.4 MMOL/L (ref 3.4–5.3)
POTASSIUM SERPL-SCNC: 3.6 MMOL/L (ref 3.4–5.3)
POTASSIUM SERPL-SCNC: 3.7 MMOL/L (ref 3.4–5.3)
PROCALCITONIN SERPL IA-MCNC: 0.1 NG/ML
PROT SERPL-MCNC: 6.6 G/DL (ref 6.4–8.3)
PROT SERPL-MCNC: 6.6 G/DL (ref 6.4–8.3)
PROT SERPL-MCNC: 6.7 G/DL (ref 6.4–8.3)
PROT SERPL-MCNC: 7.1 G/DL (ref 6.4–8.3)
RBC # BLD AUTO: 3.73 10E6/UL (ref 3.8–5.2)
RBC # BLD AUTO: 3.78 10E6/UL (ref 3.8–5.2)
RBC URINE: 1 /HPF
RETICS # AUTO: 0.1 10E6/UL (ref 0.03–0.1)
RETICS/RBC NFR AUTO: 2.6 % (ref 0.5–2)
SODIUM SERPL-SCNC: 137 MMOL/L (ref 136–145)
SODIUM SERPL-SCNC: 138 MMOL/L (ref 136–145)
SODIUM SERPL-SCNC: 140 MMOL/L (ref 136–145)
SP GR UR STRIP: 1.04 (ref 1–1.03)
TRICHOMONAS, WET PREP: ABNORMAL
UROBILINOGEN UR STRIP-MCNC: NORMAL MG/DL
WBC # BLD AUTO: 3 10E3/UL (ref 4–11)
WBC # BLD AUTO: 4 10E3/UL (ref 4–11)
WBC URINE: 1 /HPF
WBC'S/HIGH POWER FIELD, WET PREP: ABNORMAL
YEAST, WET PREP: ABNORMAL

## 2023-08-21 PROCEDURE — 86308 HETEROPHILE ANTIBODY SCREEN: CPT | Performed by: INTERNAL MEDICINE

## 2023-08-21 PROCEDURE — 76856 US EXAM PELVIC COMPLETE: CPT | Mod: 26 | Performed by: STUDENT IN AN ORGANIZED HEALTH CARE EDUCATION/TRAINING PROGRAM

## 2023-08-21 PROCEDURE — 258N000003 HC RX IP 258 OP 636: Performed by: PHYSICIAN ASSISTANT

## 2023-08-21 PROCEDURE — 86140 C-REACTIVE PROTEIN: CPT | Performed by: INTERNAL MEDICINE

## 2023-08-21 PROCEDURE — 96375 TX/PRO/DX INJ NEW DRUG ADDON: CPT | Performed by: EMERGENCY MEDICINE

## 2023-08-21 PROCEDURE — 80053 COMPREHEN METABOLIC PANEL: CPT | Performed by: INTERNAL MEDICINE

## 2023-08-21 PROCEDURE — 96361 HYDRATE IV INFUSION ADD-ON: CPT | Performed by: EMERGENCY MEDICINE

## 2023-08-21 PROCEDURE — 84703 CHORIONIC GONADOTROPIN ASSAY: CPT | Performed by: EMERGENCY MEDICINE

## 2023-08-21 PROCEDURE — 250N000011 HC RX IP 250 OP 636: Mod: JZ | Performed by: EMERGENCY MEDICINE

## 2023-08-21 PROCEDURE — 96374 THER/PROPH/DIAG INJ IV PUSH: CPT | Performed by: EMERGENCY MEDICINE

## 2023-08-21 PROCEDURE — 83605 ASSAY OF LACTIC ACID: CPT | Mod: 91 | Performed by: PHYSICIAN ASSISTANT

## 2023-08-21 PROCEDURE — 80053 COMPREHEN METABOLIC PANEL: CPT | Performed by: EMERGENCY MEDICINE

## 2023-08-21 PROCEDURE — 250N000011 HC RX IP 250 OP 636: Performed by: EMERGENCY MEDICINE

## 2023-08-21 PROCEDURE — 74177 CT ABD & PELVIS W/CONTRAST: CPT | Mod: 26 | Performed by: RADIOLOGY

## 2023-08-21 PROCEDURE — 85025 COMPLETE CBC W/AUTO DIFF WBC: CPT | Performed by: EMERGENCY MEDICINE

## 2023-08-21 PROCEDURE — 96376 TX/PRO/DX INJ SAME DRUG ADON: CPT | Performed by: EMERGENCY MEDICINE

## 2023-08-21 PROCEDURE — 250N000011 HC RX IP 250 OP 636: Performed by: INTERNAL MEDICINE

## 2023-08-21 PROCEDURE — 99222 1ST HOSP IP/OBS MODERATE 55: CPT | Mod: FS | Performed by: PHYSICIAN ASSISTANT

## 2023-08-21 PROCEDURE — 87491 CHLMYD TRACH DNA AMP PROBE: CPT | Performed by: INTERNAL MEDICINE

## 2023-08-21 PROCEDURE — 87591 N.GONORRHOEAE DNA AMP PROB: CPT | Performed by: INTERNAL MEDICINE

## 2023-08-21 PROCEDURE — 250N000013 HC RX MED GY IP 250 OP 250 PS 637: Performed by: INTERNAL MEDICINE

## 2023-08-21 PROCEDURE — 250N000013 HC RX MED GY IP 250 OP 250 PS 637: Performed by: PHYSICIAN ASSISTANT

## 2023-08-21 PROCEDURE — 93976 VASCULAR STUDY: CPT | Mod: XS

## 2023-08-21 PROCEDURE — 99285 EMERGENCY DEPT VISIT HI MDM: CPT | Mod: 25 | Performed by: EMERGENCY MEDICINE

## 2023-08-21 PROCEDURE — 82077 ASSAY SPEC XCP UR&BREATH IA: CPT | Performed by: PHYSICIAN ASSISTANT

## 2023-08-21 PROCEDURE — 120N000002 HC R&B MED SURG/OB UMMC

## 2023-08-21 PROCEDURE — 85025 COMPLETE CBC W/AUTO DIFF WBC: CPT | Performed by: INTERNAL MEDICINE

## 2023-08-21 PROCEDURE — 85060 BLOOD SMEAR INTERPRETATION: CPT | Performed by: STUDENT IN AN ORGANIZED HEALTH CARE EDUCATION/TRAINING PROGRAM

## 2023-08-21 PROCEDURE — 84145 PROCALCITONIN (PCT): CPT | Performed by: INTERNAL MEDICINE

## 2023-08-21 PROCEDURE — 36415 COLL VENOUS BLD VENIPUNCTURE: CPT | Performed by: EMERGENCY MEDICINE

## 2023-08-21 PROCEDURE — 76830 TRANSVAGINAL US NON-OB: CPT | Mod: 26 | Performed by: STUDENT IN AN ORGANIZED HEALTH CARE EDUCATION/TRAINING PROGRAM

## 2023-08-21 PROCEDURE — 99291 CRITICAL CARE FIRST HOUR: CPT | Performed by: EMERGENCY MEDICINE

## 2023-08-21 PROCEDURE — 82248 BILIRUBIN DIRECT: CPT | Performed by: EMERGENCY MEDICINE

## 2023-08-21 PROCEDURE — 87210 SMEAR WET MOUNT SALINE/INK: CPT | Performed by: INTERNAL MEDICINE

## 2023-08-21 PROCEDURE — 85652 RBC SED RATE AUTOMATED: CPT | Performed by: INTERNAL MEDICINE

## 2023-08-21 PROCEDURE — 80048 BASIC METABOLIC PNL TOTAL CA: CPT | Performed by: PHYSICIAN ASSISTANT

## 2023-08-21 PROCEDURE — 36415 COLL VENOUS BLD VENIPUNCTURE: CPT | Performed by: PHYSICIAN ASSISTANT

## 2023-08-21 PROCEDURE — 74177 CT ABD & PELVIS W/CONTRAST: CPT

## 2023-08-21 PROCEDURE — 85045 AUTOMATED RETICULOCYTE COUNT: CPT | Performed by: INTERNAL MEDICINE

## 2023-08-21 PROCEDURE — 258N000003 HC RX IP 258 OP 636: Performed by: EMERGENCY MEDICINE

## 2023-08-21 PROCEDURE — 99207 US PELVIS COMPLETE W TRANSVAGINAL AND DOPPLER LIMITED: CPT | Mod: 26 | Performed by: STUDENT IN AN ORGANIZED HEALTH CARE EDUCATION/TRAINING PROGRAM

## 2023-08-21 PROCEDURE — 83690 ASSAY OF LIPASE: CPT | Performed by: EMERGENCY MEDICINE

## 2023-08-21 PROCEDURE — 99207 PR APP CREDIT; MD BILLING SHARED VISIT: CPT | Performed by: STUDENT IN AN ORGANIZED HEALTH CARE EDUCATION/TRAINING PROGRAM

## 2023-08-21 PROCEDURE — 250N000009 HC RX 250: Performed by: EMERGENCY MEDICINE

## 2023-08-21 PROCEDURE — 85610 PROTHROMBIN TIME: CPT | Performed by: PHYSICIAN ASSISTANT

## 2023-08-21 PROCEDURE — 81001 URINALYSIS AUTO W/SCOPE: CPT | Performed by: EMERGENCY MEDICINE

## 2023-08-21 PROCEDURE — 36415 COLL VENOUS BLD VENIPUNCTURE: CPT | Performed by: INTERNAL MEDICINE

## 2023-08-21 PROCEDURE — 87484 EHRLICHA CHAFFEENSIS AMP PRB: CPT | Performed by: INTERNAL MEDICINE

## 2023-08-21 PROCEDURE — 83605 ASSAY OF LACTIC ACID: CPT | Performed by: EMERGENCY MEDICINE

## 2023-08-21 RX ORDER — LORAZEPAM 0.5 MG/1
1-2 TABLET ORAL EVERY 30 MIN PRN
Status: DISCONTINUED | OUTPATIENT
Start: 2023-08-21 | End: 2023-08-22 | Stop reason: HOSPADM

## 2023-08-21 RX ORDER — HYDROMORPHONE HYDROCHLORIDE 1 MG/ML
0.5 INJECTION, SOLUTION INTRAMUSCULAR; INTRAVENOUS; SUBCUTANEOUS ONCE
Status: COMPLETED | OUTPATIENT
Start: 2023-08-21 | End: 2023-08-21

## 2023-08-21 RX ORDER — ONDANSETRON 2 MG/ML
4 INJECTION INTRAMUSCULAR; INTRAVENOUS ONCE
Status: COMPLETED | OUTPATIENT
Start: 2023-08-21 | End: 2023-08-21

## 2023-08-21 RX ORDER — FAMOTIDINE 20 MG/1
20 TABLET, FILM COATED ORAL 2 TIMES DAILY PRN
Status: DISCONTINUED | OUTPATIENT
Start: 2023-08-21 | End: 2023-08-22 | Stop reason: HOSPADM

## 2023-08-21 RX ORDER — CALCIUM CARBONATE 500 MG/1
1000 TABLET, CHEWABLE ORAL 3 TIMES DAILY PRN
Status: DISCONTINUED | OUTPATIENT
Start: 2023-08-21 | End: 2023-08-22 | Stop reason: HOSPADM

## 2023-08-21 RX ORDER — LORAZEPAM 2 MG/ML
1-2 INJECTION INTRAMUSCULAR EVERY 30 MIN PRN
Status: DISCONTINUED | OUTPATIENT
Start: 2023-08-21 | End: 2023-08-21

## 2023-08-21 RX ORDER — IOPAMIDOL 755 MG/ML
99 INJECTION, SOLUTION INTRAVASCULAR ONCE
Status: COMPLETED | OUTPATIENT
Start: 2023-08-21 | End: 2023-08-21

## 2023-08-21 RX ORDER — LIDOCAINE 40 MG/G
CREAM TOPICAL
Status: DISCONTINUED | OUTPATIENT
Start: 2023-08-21 | End: 2023-08-22 | Stop reason: HOSPADM

## 2023-08-21 RX ORDER — MAGNESIUM HYDROXIDE/ALUMINUM HYDROXICE/SIMETHICONE 120; 1200; 1200 MG/30ML; MG/30ML; MG/30ML
30 SUSPENSION ORAL EVERY 4 HOURS PRN
Status: DISCONTINUED | OUTPATIENT
Start: 2023-08-21 | End: 2023-08-22 | Stop reason: HOSPADM

## 2023-08-21 RX ORDER — FLUMAZENIL 0.1 MG/ML
0.2 INJECTION, SOLUTION INTRAVENOUS
Status: DISCONTINUED | OUTPATIENT
Start: 2023-08-21 | End: 2023-08-22 | Stop reason: HOSPADM

## 2023-08-21 RX ORDER — KETOROLAC TROMETHAMINE 15 MG/ML
15 INJECTION, SOLUTION INTRAMUSCULAR; INTRAVENOUS ONCE
Status: COMPLETED | OUTPATIENT
Start: 2023-08-21 | End: 2023-08-21

## 2023-08-21 RX ORDER — ACETAMINOPHEN 325 MG/1
650 TABLET ORAL EVERY 6 HOURS PRN
Status: DISCONTINUED | OUTPATIENT
Start: 2023-08-21 | End: 2023-08-22 | Stop reason: HOSPADM

## 2023-08-21 RX ORDER — SODIUM CHLORIDE 9 MG/ML
INJECTION, SOLUTION INTRAVENOUS CONTINUOUS
Status: DISCONTINUED | OUTPATIENT
Start: 2023-08-21 | End: 2023-08-22

## 2023-08-21 RX ORDER — ONDANSETRON 2 MG/ML
4 INJECTION INTRAMUSCULAR; INTRAVENOUS EVERY 6 HOURS PRN
Status: DISCONTINUED | OUTPATIENT
Start: 2023-08-21 | End: 2023-08-22 | Stop reason: HOSPADM

## 2023-08-21 RX ADMIN — KETOROLAC TROMETHAMINE 15 MG: 15 INJECTION, SOLUTION INTRAMUSCULAR; INTRAVENOUS at 03:31

## 2023-08-21 RX ADMIN — ACETAMINOPHEN 650 MG: 325 TABLET, FILM COATED ORAL at 20:02

## 2023-08-21 RX ADMIN — SODIUM CHLORIDE, POTASSIUM CHLORIDE, SODIUM LACTATE AND CALCIUM CHLORIDE 1000 ML: 600; 310; 30; 20 INJECTION, SOLUTION INTRAVENOUS at 06:35

## 2023-08-21 RX ADMIN — HYDROMORPHONE HYDROCHLORIDE 0.5 MG: 1 INJECTION, SOLUTION INTRAMUSCULAR; INTRAVENOUS; SUBCUTANEOUS at 06:06

## 2023-08-21 RX ADMIN — SODIUM CHLORIDE, PRESERVATIVE FREE 75 ML: 5 INJECTION INTRAVENOUS at 04:26

## 2023-08-21 RX ADMIN — SODIUM CHLORIDE: 9 INJECTION, SOLUTION INTRAVENOUS at 21:40

## 2023-08-21 RX ADMIN — SODIUM CHLORIDE, POTASSIUM CHLORIDE, SODIUM LACTATE AND CALCIUM CHLORIDE 1000 ML: 600; 310; 30; 20 INJECTION, SOLUTION INTRAVENOUS at 03:31

## 2023-08-21 RX ADMIN — HYDROMORPHONE HYDROCHLORIDE 0.5 MG: 1 INJECTION, SOLUTION INTRAMUSCULAR; INTRAVENOUS; SUBCUTANEOUS at 09:50

## 2023-08-21 RX ADMIN — LORAZEPAM 1 MG: 0.5 TABLET ORAL at 15:02

## 2023-08-21 RX ADMIN — IOPAMIDOL 99 ML: 755 INJECTION, SOLUTION INTRAVENOUS at 04:26

## 2023-08-21 RX ADMIN — SODIUM CHLORIDE: 9 INJECTION, SOLUTION INTRAVENOUS at 14:49

## 2023-08-21 RX ADMIN — ONDANSETRON 4 MG: 2 INJECTION INTRAMUSCULAR; INTRAVENOUS at 09:49

## 2023-08-21 RX ADMIN — ONDANSETRON 4 MG: 2 INJECTION INTRAMUSCULAR; INTRAVENOUS at 06:36

## 2023-08-21 RX ADMIN — SODIUM CHLORIDE, POTASSIUM CHLORIDE, SODIUM LACTATE AND CALCIUM CHLORIDE 1000 ML: 600; 310; 30; 20 INJECTION, SOLUTION INTRAVENOUS at 04:59

## 2023-08-21 ASSESSMENT — ACTIVITIES OF DAILY LIVING (ADL)
ADLS_ACUITY_SCORE: 35
ADLS_ACUITY_SCORE: 18
ADLS_ACUITY_SCORE: 35
ADLS_ACUITY_SCORE: 35
ADLS_ACUITY_SCORE: 18
ADLS_ACUITY_SCORE: 35
ADLS_ACUITY_SCORE: 18
ADLS_ACUITY_SCORE: 35

## 2023-08-21 NOTE — ED NOTES
"     Emergency Department Patient Sign-out       Brief HPI:  This is a 33 year old female signed out to me by Dr. MARIZOL Coombs .  See initial ED Provider note for details of the presentation.            Significant Events prior to my assuming care: labs noted high lactic acid, slightly low WBC and Plat, CT neg.      Exam:   Patient Vitals for the past 24 hrs:   BP Temp Temp src Pulse Resp SpO2 Height Weight   08/21/23 1400 (!) 137/94 -- -- 86 -- 96 % -- --   08/21/23 1330 117/88 -- -- 89 -- 97 % -- --   08/21/23 1100 (!) 136/92 -- -- 76 -- 97 % -- --   08/21/23 1030 (!) 136/96 -- -- 70 -- 95 % -- --   08/21/23 1000 121/79 -- -- 74 -- 94 % -- --   08/21/23 0951 130/88 -- -- -- 16 98 % -- --   08/21/23 0927 (!) 143/95 -- -- 99 -- -- -- --   08/21/23 0755 (!) 130/92 98.1  F (36.7  C) Oral 94 18 94 % -- --   08/21/23 0633 -- 98  F (36.7  C) Oral -- -- -- -- --   08/21/23 0608 -- -- -- -- -- 98 % -- --   08/21/23 0607 (!) 117/94 -- -- 79 -- -- -- --   08/21/23 0359 (!) 120/98 -- -- 82 -- 96 % -- --   08/21/23 0258 (!) 139/99 98  F (36.7  C) Oral 92 20 96 % 1.753 m (5' 9\") 72.6 kg (160 lb)           ED RESULTS:   Results for orders placed or performed during the hospital encounter of 08/21/23 (from the past 24 hour(s))   Extra Tube ([place] Draw)     Status: None    Collection Time: 08/21/23  3:06 AM    Narrative    The following orders were created for panel order Extra Tube ([place] Draw).  Procedure                               Abnormality         Status                     ---------                               -----------         ------                     Extra Blue Top Tube[[id number]]                              Final result               Extra Red Top Tube[[id number]]                               Final result               Extra Green Top (Lithium...[[id number]]                      Final result               Extra Purple Top Tube[[id number]]                            Final result                 Please view " results for these tests on the individual orders.   Extra Blue Top Tube     Status: None    Collection Time: 08/21/23  3:06 AM   Result Value Ref Range    Hold Specimen [facility]    Extra Red Top Tube     Status: None    Collection Time: 08/21/23  3:06 AM   Result Value Ref Range    Hold Specimen [facility]    Extra Green Top (Lithium Heparin) Tube     Status: None    Collection Time: 08/21/23  3:06 AM   Result Value Ref Range    Hold Specimen [facility]    Extra Purple Top Tube     Status: None    Collection Time: 08/21/23  3:06 AM   Result Value Ref Range    Hold Specimen [facility]    CBC with platelets differential     Status: Abnormal    Collection Time: 08/21/23  3:06 AM    Narrative    The following orders were created for panel order CBC with platelets differential.  Procedure                               Abnormality         Status                     ---------                               -----------         ------                     CBC with platelets and d...[[id number]]  Abnormal            Final result                 Please view results for these tests on the individual orders.   Comprehensive metabolic panel     Status: Abnormal    Collection Time: 08/21/23  3:06 AM   Result Value Ref Range    Sodium 140 136 - 145 mmol/L    Potassium 3.4 3.4 - 5.3 mmol/L    Chloride 102 98 - 107 mmol/L    Carbon Dioxide (CO2) 22 22 - 29 mmol/L    Anion Gap 16 (H) 7 - 15 mmol/L    Urea Nitrogen 6.3 6.0 - 20.0 mg/dL    Creatinine 0.51 0.51 - 0.95 mg/dL    Calcium 8.9 8.6 - 10.0 mg/dL    Glucose 123 (H) 70 - 99 mg/dL    Alkaline Phosphatase 62 35 - 104 U/L     (H) 0 - 45 U/L    ALT 52 (H) 0 - 50 U/L    Protein Total 6.7 6.4 - 8.3 g/dL    Albumin 4.4 3.5 - 5.2 g/dL    Bilirubin Total 0.4 <=1.2 mg/dL    GFR Estimate >90 >60 mL/min/1.73m2   Lipase     Status: Normal    Collection Time: 08/21/23  3:06 AM   Result Value Ref Range    Lipase 33 13 - 60 U/L   HCG qualitative Blood     Status: Normal    Collection Time: 08/21/23  3:06 AM   Result  Value Ref Range    hCG Serum Qualitative Negative Negative   CBC with platelets and differential     Status: Abnormal    Collection Time: 08/21/23  3:06 AM   Result Value Ref Range    WBC Count 3.0 (L) 4.0 - 11.0 10e3/uL    RBC Count 3.78 (L) 3.80 - 5.20 10e6/uL    Hemoglobin 12.7 11.7 - 15.7 g/dL    Hematocrit 37.0 35.0 - 47.0 %    MCV 98 78 - 100 fL    MCH 33.6 (H) 26.5 - 33.0 pg    MCHC 34.3 31.5 - 36.5 g/dL    RDW 12.9 10.0 - 15.0 %    Platelet Count 100 (L) 150 - 450 10e3/uL    % Neutrophils 45 %    % Lymphocytes 40 %    % Monocytes 10 %    % Eosinophils 4 %    % Basophils 1 %    % Immature Granulocytes 0 %    NRBCs per 100 WBC 0 <1 /100    Absolute Neutrophils 1.3 (L) 1.6 - 8.3 10e3/uL    Absolute Lymphocytes 1.2 0.8 - 5.3 10e3/uL    Absolute Monocytes 0.3 0.0 - 1.3 10e3/uL    Absolute Eosinophils 0.1 0.0 - 0.7 10e3/uL    Absolute Basophils 0.0 0.0 - 0.2 10e3/uL    Absolute Immature Granulocytes 0.0 <=0.4 10e3/uL    Absolute NRBCs 0.0 10e3/uL   Procalcitonin     Status: Abnormal    Collection Time: 08/21/23  3:06 AM   Result Value Ref Range    Procalcitonin 0.10 (H) <0.05 ng/mL   CRP inflammation     Status: Normal    Collection Time: 08/21/23  3:06 AM   Result Value Ref Range    CRP Inflammation <3.00 <5.00 mg/L   Hepatic function panel     Status: Abnormal    Collection Time: 08/21/23  3:06 AM   Result Value Ref Range    Protein Total 6.6 6.4 - 8.3 g/dL    Albumin 4.2 3.5 - 5.2 g/dL    Bilirubin Total 0.4 <=1.2 mg/dL    Alkaline Phosphatase 62 35 - 104 U/L     (H) 0 - 45 U/L    ALT 56 (H) 0 - 50 U/L    Bilirubin Direct <0.20 0.00 - 0.30 mg/dL   Lactic acid whole blood     Status: Abnormal    Collection Time: 08/21/23  3:42 AM   Result Value Ref Range    Lactic Acid 3.0 (H) 0.7 - 2.0 mmol/L   CT Abdomen Pelvis w Contrast     Status: None    Collection Time: 08/21/23  4:36 AM    Narrative    EXAM: CT ABDOMEN PELVIS W CONTRAST  LOCATION: [facility]  DATE:  8/21/2023    INDICATION: Lower abdominal pain, vomiting, lactic acidosis  COMPARISON: 09/05/2020  TECHNIQUE: CT scan of the abdomen and pelvis was performed following injection of IV contrast. Multiplanar reformats were obtained. Dose reduction techniques were used.  CONTRAST: 99 ml isovue 370    FINDINGS:   LOWER CHEST: Normal.    HEPATOBILIARY: Normal size of the liver with advanced steatosis. No focal lesions. Gallbladder is normal.    PANCREAS: Normal.    SPLEEN: Normal.    ADRENAL GLANDS: Normal.    KIDNEYS/BLADDER: Normal.    BOWEL: No focal inflammation or obstruction. Appendix is surgically absent.    LYMPH NODES: Normal.    VASCULATURE: Unremarkable.    PELVIC ORGANS: Normal. Multiple follicles are present in the left ovary. Previous large right ovarian dermoid has been removed.    MUSCULOSKELETAL: Thinning and diastases of the lower ventral abdomen redemonstrated, unchanged. No suspicious osseous lesions or acute fractures..      Impression    IMPRESSION:   1.  Advanced hepatic steatosis. No acute findings in the abdomen and pelvis.   UA with Microscopic reflex to Culture     Status: Abnormal    Collection Time: 08/21/23  4:49 AM    Specimen: Urine, Midstream   Result Value Ref Range    Color Urine Light Yellow Colorless, Straw, Light Yellow, Yellow    Appearance Urine Clear Clear    Glucose Urine Negative Negative mg/dL    Bilirubin Urine Negative Negative    Ketones Urine Trace (A) Negative mg/dL    Specific Gravity Urine 1.039 (H) 1.003 - 1.035    Blood Urine Negative Negative    pH Urine 6.5 5.0 - 7.0    Protein Albumin Urine Negative Negative mg/dL    Urobilinogen Urine Normal Normal, 2.0 mg/dL    Nitrite Urine Negative Negative    Leukocyte Esterase Urine Negative Negative    Mucus Urine Present (A) None Seen /LPF    RBC Urine 1 <=2 /HPF    WBC Urine 1 <=5 /HPF    Narrative    Urine Culture not indicated   Lactic acid whole blood     Status: Abnormal    Collection Time: 08/21/23  6:04 AM   Result  Value Ref Range    Lactic Acid 4.0 (HH) 0.7 - 2.0 mmol/L   Lactic acid whole blood     Status: Abnormal    Collection Time: 08/21/23  7:58 AM   Result Value Ref Range    Lactic Acid 3.3 (H) 0.7 - 2.0 mmol/L   US Pelvis Cmplt w Transvag & Doppler LmtPel Duplex Limited     Status: None    Collection Time: 08/21/23  8:28 AM    Narrative    EXAM: Pelvic ultrasound 8/21/2023 8:28 AM     HISTORY: left pelvic pain, r/o torsion, hx of right sided torsion.     COMPARISON: Seen in the abdomen/pelvis CT with contrast.     TECHNIQUE: The pelvis was scanned in standard fashion with  transabdominal and transvaginal transducer(s) using grayscale, color  Doppler, and spectral flow techniques.    FINDINGS:    UTERUS: The uterus measures 5.9 x 5.0 x 3.0 cm, and there is no  evidence of a focal fibroid or suspicious mass. Normal uterine  parenchymal echotexture. The endometrium is within normal limits and  measures 1 mm. There is no free fluid in the pelvis.    RIGHT OVARY: Not visualized.    LEFT OVARY: The left ovary measures 2.9 x 4.1 x 1.5 cm. Normal  follicular architecture is visualized bilaterally. No suspicious  adnexal mass. There is normal arterial Doppler flow.     BLADDER: Moderately distended and unremarkable.       Impression    IMPRESSION: Normal pelvic ultrasound. Please note, the right ovary was  not visualized.    I have personally reviewed the examination and initial interpretation  and I agree with the findings.    INDY COLINDRES DO         SYSTEM ID:  [id number]   Lactic acid whole blood     Status: Abnormal    Collection Time: 08/21/23  9:26 AM   Result Value Ref Range    Lactic Acid 3.1 (H) 0.7 - 2.0 mmol/L   Erythrocyte sedimentation rate auto     Status: Normal    Collection Time: 08/21/23 10:42 AM   Result Value Ref Range    Erythrocyte Sedimentation Rate 5 0 - 20 mm/hr   Lab Blood Morphology Pathologist Review     Status: Abnormal (In process)    Collection Time: 08/21/23 10:42 AM    Narrative    The following  orders were created for panel order Lab Blood Morphology Pathologist Review.  Procedure                               Abnormality         Status                     ---------                               -----------         ------                     Bld morphology pathology...[[id number]]                      In process                 CBC with platelets and d...[[id number]]  Abnormal            Final result               Reticulocyte count[[id number]]           Abnormal            Final result               Morphology Tracking[[id number]]                              Final result                 Please view results for these tests on the individual orders.   [place] Draw     Status: None    Collection Time: 08/21/23 10:42 AM    Narrative    The following orders were created for panel order [place] Draw.  Procedure                               Abnormality         Status                     ---------                               -----------         ------                     Extra Red Top Tube[[id number]]                               Final result               Extra Green Top (Lithium...[[id number]]                      Final result                 Please view results for these tests on the individual orders.   CBC with platelets and differential     Status: Abnormal    Collection Time: 08/21/23 10:42 AM   Result Value Ref Range    WBC Count 4.0 4.0 - 11.0 10e3/uL    RBC Count 3.73 (L) 3.80 - 5.20 10e6/uL    Hemoglobin 12.9 11.7 - 15.7 g/dL    Hematocrit 36.8 35.0 - 47.0 %    MCV 99 78 - 100 fL    MCH 34.6 (H) 26.5 - 33.0 pg    MCHC 35.1 31.5 - 36.5 g/dL    RDW 12.9 10.0 - 15.0 %    Platelet Count 89 (L) 150 - 450 10e3/uL    % Neutrophils 73 %    % Lymphocytes 16 %    % Monocytes 9 %    % Eosinophils 1 %    % Basophils 0 %    % Immature Granulocytes 1 %    NRBCs per 100 WBC 0 <1 /100    Absolute Neutrophils 3.0 1.6 - 8.3 10e3/uL    Absolute Lymphocytes 0.6 (L) 0.8 - 5.3 10e3/uL    Absolute Monocytes 0.3 0.0 - 1.3  10e3/uL    Absolute Eosinophils 0.0 0.0 - 0.7 10e3/uL    Absolute Basophils 0.0 0.0 - 0.2 10e3/uL    Absolute Immature Granulocytes 0.0 <=0.4 10e3/uL    Absolute NRBCs 0.0 10e3/uL   Reticulocyte count     Status: Abnormal    Collection Time: 08/21/23 10:42 AM   Result Value Ref Range    % Reticulocyte 2.6 (H) 0.5 - 2.0 %    Absolute Reticulocyte 0.099 (H) 0.025 - 0.095 10e6/uL   Extra Red Top Tube     Status: None    Collection Time: 08/21/23 10:42 AM   Result Value Ref Range    Hold Specimen [facility]    Extra Green Top (Lithium Heparin) Tube     Status: None    Collection Time: 08/21/23 10:42 AM   Result Value Ref Range    Hold Specimen [facility]    Comprehensive metabolic panel     Status: Abnormal    Collection Time: 08/21/23 10:42 AM   Result Value Ref Range    Sodium 137 136 - 145 mmol/L    Potassium 3.7 3.4 - 5.3 mmol/L    Chloride 99 98 - 107 mmol/L    Carbon Dioxide (CO2) 20 (L) 22 - 29 mmol/L    Anion Gap 18 (H) 7 - 15 mmol/L    Urea Nitrogen 3.1 (L) 6.0 - 20.0 mg/dL    Creatinine 0.42 (L) 0.51 - 0.95 mg/dL    Calcium 8.4 (L) 8.6 - 10.0 mg/dL    Glucose 94 70 - 99 mg/dL    Alkaline Phosphatase 59 35 - 104 U/L     (H) 0 - 45 U/L    ALT 51 (H) 0 - 50 U/L    Protein Total 6.6 6.4 - 8.3 g/dL    Albumin 4.3 3.5 - 5.2 g/dL    Bilirubin Total 0.8 <=1.2 mg/dL    GFR Estimate >90 >60 mL/min/1.73m2   Alcohol ethyl     Status: Normal    Collection Time: 08/21/23 10:42 AM   Result Value Ref Range    Alcohol ethyl <0.01 <=0.01 g/dL   Wet prep     Status: Abnormal    Collection Time: 08/21/23 11:57 AM    Specimen: Vagina; Swab   Result Value Ref Range    Trichomonas Absent Absent    Yeast Absent Absent    Clue Cells Absent Absent    WBCs/high power field 1+ (A) None       ED MEDICATIONS:   Medications   lidocaine 1 % 0.1-1 mL (has no administration in time range)   lidocaine (LMX4) cream (has no administration in time range)   sodium chloride (PF) 0.9% PF flush 3 mL (has no administration in time range)   sodium chloride  (PF) 0.9% PF flush 3 mL (has no administration in time range)   melatonin tablet 1 mg (has no administration in time range)   flumazenil (ROMAZICON) injection 0.2 mg (has no administration in time range)   LORazepam (ATIVAN) tablet 1-2 mg (has no administration in time range)   sodium chloride 0.9% infusion (has no administration in time range)   lactated ringers BOLUS 1,000 mL (0 mLs Intravenous Stopped 8/21/23 0456)   ketorolac (TORADOL) injection 15 mg (15 mg Intravenous $Given 8/21/23 0331)   lactated ringers BOLUS 1,000 mL (0 mLs Intravenous Stopped 8/21/23 0603)   iopamidol (ISOVUE-370) solution 99 mL (99 mLs Intravenous $Given 8/21/23 0426)   sodium chloride 0.9 % bag 500mL for CT scan flush use (75 mLs Intravenous $Given 8/21/23 0426)   HYDROmorphone (PF) (DILAUDID) injection 0.5 mg (0.5 mg Intravenous $Given 8/21/23 0606)   ondansetron (ZOFRAN) injection 4 mg (4 mg Intravenous $Given 8/21/23 0636)   lactated ringers BOLUS 1,000 mL (0 mLs Intravenous Stopped 8/21/23 0738)   HYDROmorphone (PF) (DILAUDID) injection 0.5 mg (0.5 mg Intravenous $Given 8/21/23 0950)   ondansetron (ZOFRAN) injection 4 mg (4 mg Intravenous $Given 8/21/23 0949)         Impression:    ICD-10-CM    1. Lower abdominal pain  R10.30       2. Pelvic pain  R10.2       3. Elevated lactic acid level  R79.89       4. Increased anion gap metabolic acidosis  E87.29       5. Low platelet count (H)  D69.6       6. LFT elevation  R79.89           Plan:    Pending studies include US neg for torsion then another episode of painful left pelvis, D/W Gyn-reviewed the chart, no concern for torsion, but clinically remained concerned about this high lactic acid and intermittent pain, h/o ovarian torsion with high lactic acid, D/W Medicine after repeated CBC CMP and Lactic acid, admit to [facility] after Surgery consult, probable formal Gyn consult there, also added monospot and anaplamosis with low WBC and Plat.    Will sign off to incoming EP..         Latoya Field MD, Latoya Pickens MD  08/21/23 8903       Latoya Fiedl MD  08/21/23 8246

## 2023-08-21 NOTE — PROGRESS NOTES
"[id number]    BP (!) 148/98 (BP Location: Right arm)   Pulse 88   Temp 98.8  F (37.1  C) (Oral)   Resp 18   Ht 1.753 m (5' 9\")   Wt 73.1 kg (161 lb 2.5 oz)   LMP 08/08/2023 (Exact Date)   SpO2 98%   BMI 23.80 kg/m      A&O x4. Patient able to make needs known. Continent of bowel and bladder. Voids spontaneously without difficulty. Last BM 8/20 per patient report. Independent in room. Denies pain at this time. Denies SOB, n/v, numbness, tingling. Regular diet. R PIV lower forearm infusing NS @ 125 mL/hr. Continue with plan of care.     "

## 2023-08-21 NOTE — H&P
"[facility]    History and Physical - Hospitalist Service, GOLD TEAM        Date of Admission:  8/21/2023    Assessment & Plan   Ms. Yuliana Licona is a pleasant 32 yo female with hx of perforated appendicitis s/p surgical repair (2018), ovarian torsion s/p surgical repair (2020) and etoh abuse admitted to [facility] after presenting to ED with acute onset of LLQ abd pain, nausea and vomiting.     # Acute abd pain, nausea and vomiting: Improving. Woke up last night with acute onset LLQ abd pain. Went to bathroom and had hard BM, then began to have recurrent nausea and vomiting so came to ED. Denies hematemesis. Workup in ED revealed elevated LA, but imaging negative and other labs unremarkable, including normal lipase, CRP, and procal. UA and wet prep neg. VSS with no leukocytosis.  Unclear etiology of sxs, but abd exam remains benign into this afternoon and pt states pain much improved. EGS consulted given elevated LA and her hx of ovarian torsion s/p surgical repair (2020), but signed off due to aforementioned pertinent negatives.    - Continue to monitor closely for ongoing improvement in sxs  - Nausea: Continue prn Zofran.     # Lactic acidosis, AGMA, thrombocytopenia, and mild transaminitis  # Advanced hepatic steatosis  # C/f etoh abuse and withdrawal  Imaging from 2020 revealed hepatic steatosis, and today's CT abd revealed this is advanced. Due to this and combination of her mild thrombocytopenia, mildly elevated LFTs, LA, AGMA, as well as pt appearing to be tremulous on exam, she was asked about her etoh use and she stated she drinks \"a lot\" - 2-3 drinks per night - but denies having a etoh use problem. Add-on etoh level neg., but this was obtained many hours after presenting to ED and after receiving multiple IVF boluses.   - Start CIWA protocol  - Repeat LA early this afternoon normalized.   - Repeat LFTs still in process.   - GI referral placed in discharge orders. "        Diet: Combination Diet Regular Diet Adult    DVT Prophylaxis: Pneumatic Compression Devices  Nunes Catheter: Not present  Lines: None     Cardiac Monitoring: None  Code Status: Full Code        Disposition Plan To home after medical stabilization     Expected Discharge Date: 08/22/2023                The patient's care was discussed with the Attending Physician, Dr. Huff, Bedside Nurse, and Patient.    Tom Mccoy PA-C  Hospitalist Service, [facility]  Securely message with [employer] (more info)  Text page via [facility] Paging/Directory   See signed in provider for up to date coverage information  _____________________________________________________________________    Chief Complaint   Acute LLQ abd pain w/ nausea and vomiting    History is obtained from the patient and electronic health record    History of Present Illness   Please refer to ED notes by Latasha Coombs and Latoya dated 8/21 for full details. In brief, Ms. Yuliana Licona is a pleasant 34 yo female with hx of perforated appendicitis s/p surgical repair (2018), ovarian torsion s/p surgical repair (2020) and etoh abuse admitted to [facility] after presenting to ED with acute onset of LLQ abd pain, nausea and vomiting.     Currently pt admits to improved nausea and denies vomiting since presentation. Poor appetite. Abd pain improved since presentation. Passing gas but no BM since admission. Denies fever, chills, chest pain, SOB, and other acute physical concerns at this time.       Past Medical History    No past medical history on file.    Past Surgical History   Past Surgical History:   Procedure Laterality Date    APPENDECTOMY      07/18    LAPAROTOMY EXPLORATORY N/A 1/18/2018    Procedure: LAPAROTOMY EXPLORATORY;  Exploratory Laparotomy, Appendectomy;  Surgeon: Brianna Guthrie MD;  Location: [facility] OR    LAPAROTOMY EXPLORATORY Right 9/5/2020    Procedure: Right Mini Laparotomy with  Right Salpingo-oophorectomy;  Surgeon: Sade Bergman MD;  Location: [facility] OR       Prior to Admission Medications   None        Review of Systems    The 10 point Review of Systems is negative other than noted in the HPI or here.     Social History   I have reviewed this patient's social history and updated it with pertinent information if needed.  Social History     Tobacco Use    Smoking status: Never    Smokeless tobacco: Never   Substance Use Topics    Alcohol use: Yes     Comment: 3 drinks/day    Drug use: Yes     Types: Marijuana, Cocaine     Comment: reports regular marijuana use and rare cocaine use         Family History           Allergies   No Known Allergies     Physical Exam   Vital Signs: Temp: 98.1  F (36.7  C) Temp src: Oral BP: (!) 137/94 Pulse: 86   Resp: 16 SpO2: 96 % O2 Device: None (Room air)    Weight: 160 lbs 0 oz  GEN: In NAD  HEENT: NCAT; PERRL; sclerae non-icteric  LUNGS: CTAB  CV: RRR  ABD: +BSs; soft, ND, with mild LLQ tenderness without guarding or rebound.   EXT: No BLE edema  SKIN: No acute rashes noted on exposed areas.  NEURO: AAOx3; CNs grossly intact; No acute focal deficits noted.      Medical Decision Making       60 MINUTES SPENT BY ME on the date of service doing chart review, history, exam, documentation & further activities per the note.      Data   CMP  Recent Labs   Lab 08/21/23  1042 08/21/23  0306    140   POTASSIUM 3.7 3.4   CHLORIDE 99 102   CO2 20* 22   ANIONGAP 18* 16*   GLC 94 123*   BUN 3.1* 6.3   CR 0.42* 0.51   GFRESTIMATED >90 >90   MOE 8.4* 8.9   PROTTOTAL 6.6 6.6  6.7   ALBUMIN 4.3 4.2  4.4   BILITOTAL 0.8 0.4  0.4   ALKPHOS 59 62  62   * 136*  133*   ALT 51* 56*  52*     CBC  Recent Labs   Lab 08/21/23  1042 08/21/23  0306   WBC 4.0 3.0*   RBC 3.73* 3.78*   HGB 12.9 12.7   HCT 36.8 37.0   MCV 99 98   MCH 34.6* 33.6*   MCHC 35.1 34.3   RDW 12.9 12.9   PLT 89* 100*

## 2023-08-21 NOTE — PROGRESS NOTES
6MS ADMISSION    D: Patient admitted from [place] ED via medical transport for LFT elevation.     I: Upon arrival to the unit patient was oriented to room, unit, and call light. Patient s height, weight, and vital signs were obtained. Allergies reviewed and allergy band applied. Provider notified of patient s arrival on the unit. Adult AVS completed. Head to toe assessment completed. Education assessment completed. Care plan initiated.    A: Vital signs stable upon admission. Patient denies pain at this time.     P: Continue to monitor patient s status and intervene as needed. Continue with plan of care. Notify provider with any concerns or changes in patient status.

## 2023-08-21 NOTE — MEDICATION SCRIBE - ADMISSION MEDICATION HISTORY
Medication Scribe Admission Medication History    Admission medication history is complete. The information provided in this note is only as accurate as the sources available at the time of the update.    Medication reconciliation/reorder completed by provider prior to medication history? No    Information Source(s): Patient via in-person    Pertinent Information:   Patient reported that she is not taking any prescription medication at this time.  Patient was prescribed Moxifloxacin for an eye infection.  Medication dispensed 8/9/2023 15 day supply patient should still be taking this medication ,but she reported to writer that she had stopped using medication earlier in the week. Patient also reported not having any allergies.  Changes made to PTA medication list:  Added: None  Deleted: None  Changed: None    Medication Affordability:       Allergies reviewed with patient and updates made in EHR: no    Medication History Completed By: Dulce Garcia 8/21/2023 3:49 PM    Prior to Admission medications    Not on File

## 2023-08-21 NOTE — ED PROVIDER NOTES
ED Provider Note  [facility]      History     Chief Complaint   Patient presents with    Abdominal Pain     HPI  Yuliana Licona is a 33 year old female who has a past medical history of ruptured appendicitis complicated by postoperative abscess, ovarian torsion status post right salpingo-oophorectomy who presents the emergency department with a complaint of abdominal pain.  Patient states that she went to bed last night feeling fine however woke up about 20 minutes later with severe abdominal pain.  Patient complains of intermittent severe lower abdominal pain that is described as a sharp cramping pain.  No radiation of the pain, no aggravating or alleviating factors.  Patient states that she attempted to go to the bathroom and shortly after she had an episode of nausea and vomiting.  Patient reports improvement in her symptoms after vomiting.  Patient denies any fever, chills, cough, chest pain, shortness of breath, dysuria.  Denies any vaginal bleeding, discharge.  No other pelvic complaints.    Past Medical History  No past medical history on file.  Past Surgical History:   Procedure Laterality Date    APPENDECTOMY      07/18    LAPAROTOMY EXPLORATORY N/A 1/18/2018    Procedure: LAPAROTOMY EXPLORATORY;  Exploratory Laparotomy, Appendectomy;  Surgeon: Brianna Guthrei MD;  Location: [facility] OR    LAPAROTOMY EXPLORATORY Right 9/5/2020    Procedure: Right Mini Laparotomy with Right Salpingo-oophorectomy;  Surgeon: Sade Bergman MD;  Location: [facility] OR     acetaminophen (TYLENOL) 325 MG tablet  artificial tears SOLN  erythromycin (ROMYCIN) 5 MG/GM ophthalmic ointment  ibuprofen (ADVIL/MOTRIN) 600 MG tablet  moxifloxacin (VIGAMOX) 0.5 % ophthalmic solution  oxyCODONE-acetaminophen (PERCOCET) 5-325 MG tablet  senna-docusate (SENOKOT-S/PERICOLACE) 8.6-50 MG tablet      No Known Allergies  Family History  Family History   Problem Relation Age of Onset    Cancer No family hx of         no  "skin cancer     Social History   Social History     Tobacco Use    Smoking status: Never    Smokeless tobacco: Never   Substance Use Topics    Alcohol use: Yes     Comment: 3 drinks/day    Drug use: Yes     Types: Marijuana, Cocaine     Comment: reports regular marijuana use and rare cocaine use      Past medical history, past surgical history, medications, allergies, family history, and social history were reviewed with the patient. No additional pertinent items.      A medically appropriate review of systems was performed with pertinent positives and negatives noted in the HPI, and all other systems negative.    Physical Exam   BP: (!) 139/99  Pulse: 92  Temp: 98  F (36.7  C)  Resp: 20  Height: 175.3 cm (5' 9\")  Weight: 72.6 kg (160 lb)  SpO2: 96 %  Physical Exam  General: Afebrile, moderate distress 2/2 to pain   HEENT: Normocephalic, atraumatic, conjunctivae normal. MMM  Neck: non-tender, supple  Cardio: regular rate. regular rhythm   Resp: Normal work of breathing, no respiratory distress, lungs clear bilaterally, no wheezing, rhonchi, rales  Chest/Back: no visual signs of trauma, no CVA tenderness   Abdomen: soft, non distension, +mild TTP lower abdomen (suprapubic and LLQ) with no rebound, no guarding, no peritoneal signs   Neuro: alert and fully oriented. CN II-XII grossly intact. Grossly normal strength and sensation in all extremities.   MSK: no deformities. Normal range of motion  Integumentary/Skin: no rash visualized, normal color  Psych: normal affect, normal behavior      ED Course, Procedures, & Data      Procedures       Results for orders placed or performed during the hospital encounter of 08/21/23   CT Abdomen Pelvis w Contrast     Status: None    Narrative    EXAM: CT ABDOMEN PELVIS W CONTRAST  LOCATION: [facility]  DATE: 8/21/2023    INDICATION: Lower abdominal pain, vomiting, lactic acidosis  COMPARISON: 09/05/2020  TECHNIQUE: CT scan of the " abdomen and pelvis was performed following injection of IV contrast. Multiplanar reformats were obtained. Dose reduction techniques were used.  CONTRAST: 99 ml isovue 370    FINDINGS:   LOWER CHEST: Normal.    HEPATOBILIARY: Normal size of the liver with advanced steatosis. No focal lesions. Gallbladder is normal.    PANCREAS: Normal.    SPLEEN: Normal.    ADRENAL GLANDS: Normal.    KIDNEYS/BLADDER: Normal.    BOWEL: No focal inflammation or obstruction. Appendix is surgically absent.    LYMPH NODES: Normal.    VASCULATURE: Unremarkable.    PELVIC ORGANS: Normal. Multiple follicles are present in the left ovary. Previous large right ovarian dermoid has been removed.    MUSCULOSKELETAL: Thinning and diastases of the lower ventral abdomen redemonstrated, unchanged. No suspicious osseous lesions or acute fractures..      Impression    IMPRESSION:   1.  Advanced hepatic steatosis. No acute findings in the abdomen and pelvis.   Extra Tube ([place] Draw)     Status: None    Narrative    The following orders were created for panel order Extra Tube ([place] Draw).  Procedure                               Abnormality         Status                     ---------                               -----------         ------                     Extra Blue Top Tube[[id number]]                              Final result               Extra Red Top Tube[[id number]]                               Final result               Extra Green Top (Lithium...[[id number]]                      Final result               Extra Purple Top Tube[[id number]]                            Final result                 Please view results for these tests on the individual orders.   Extra Blue Top Tube     Status: None   Result Value Ref Range    Hold Specimen [facility]    Extra Red Top Tube     Status: None   Result Value Ref Range    Hold Specimen JIC    Extra Green Top (Lithium Heparin) Tube     Status: None   Result Value Ref Range    Hold Specimen JIC    Extra  Purple Top Tube     Status: None   Result Value Ref Range    Hold Specimen [facility]    Comprehensive metabolic panel     Status: Abnormal   Result Value Ref Range    Sodium 140 136 - 145 mmol/L    Potassium 3.4 3.4 - 5.3 mmol/L    Chloride 102 98 - 107 mmol/L    Carbon Dioxide (CO2) 22 22 - 29 mmol/L    Anion Gap 16 (H) 7 - 15 mmol/L    Urea Nitrogen 6.3 6.0 - 20.0 mg/dL    Creatinine 0.51 0.51 - 0.95 mg/dL    Calcium 8.9 8.6 - 10.0 mg/dL    Glucose 123 (H) 70 - 99 mg/dL    Alkaline Phosphatase 62 35 - 104 U/L     (H) 0 - 45 U/L    ALT 52 (H) 0 - 50 U/L    Protein Total 6.7 6.4 - 8.3 g/dL    Albumin 4.4 3.5 - 5.2 g/dL    Bilirubin Total 0.4 <=1.2 mg/dL    GFR Estimate >90 >60 mL/min/1.73m2   Lipase     Status: Normal   Result Value Ref Range    Lipase 33 13 - 60 U/L   HCG qualitative Blood     Status: Normal   Result Value Ref Range    hCG Serum Qualitative Negative Negative   UA with Microscopic reflex to Culture     Status: Abnormal    Specimen: Urine, Midstream   Result Value Ref Range    Color Urine Light Yellow Colorless, Straw, Light Yellow, Yellow    Appearance Urine Clear Clear    Glucose Urine Negative Negative mg/dL    Bilirubin Urine Negative Negative    Ketones Urine Trace (A) Negative mg/dL    Specific Gravity Urine 1.039 (H) 1.003 - 1.035    Blood Urine Negative Negative    pH Urine 6.5 5.0 - 7.0    Protein Albumin Urine Negative Negative mg/dL    Urobilinogen Urine Normal Normal, 2.0 mg/dL    Nitrite Urine Negative Negative    Leukocyte Esterase Urine Negative Negative    Mucus Urine Present (A) None Seen /LPF    RBC Urine 1 <=2 /HPF    WBC Urine 1 <=5 /HPF    Narrative    Urine Culture not indicated   Lactic acid whole blood     Status: Abnormal   Result Value Ref Range    Lactic Acid 3.0 (H) 0.7 - 2.0 mmol/L   CBC with platelets and differential     Status: Abnormal   Result Value Ref Range    WBC Count 3.0 (L) 4.0 - 11.0 10e3/uL    RBC Count 3.78 (L) 3.80 - 5.20 10e6/uL    Hemoglobin 12.7 11.7 -  15.7 g/dL    Hematocrit 37.0 35.0 - 47.0 %    MCV 98 78 - 100 fL    MCH 33.6 (H) 26.5 - 33.0 pg    MCHC 34.3 31.5 - 36.5 g/dL    RDW 12.9 10.0 - 15.0 %    Platelet Count 100 (L) 150 - 450 10e3/uL    % Neutrophils 45 %    % Lymphocytes 40 %    % Monocytes 10 %    % Eosinophils 4 %    % Basophils 1 %    % Immature Granulocytes 0 %    NRBCs per 100 WBC 0 <1 /100    Absolute Neutrophils 1.3 (L) 1.6 - 8.3 10e3/uL    Absolute Lymphocytes 1.2 0.8 - 5.3 10e3/uL    Absolute Monocytes 0.3 0.0 - 1.3 10e3/uL    Absolute Eosinophils 0.1 0.0 - 0.7 10e3/uL    Absolute Basophils 0.0 0.0 - 0.2 10e3/uL    Absolute Immature Granulocytes 0.0 <=0.4 10e3/uL    Absolute NRBCs 0.0 10e3/uL   Lactic acid whole blood     Status: Abnormal   Result Value Ref Range    Lactic Acid 4.0 (HH) 0.7 - 2.0 mmol/L   CBC with platelets differential     Status: Abnormal    Narrative    The following orders were created for panel order CBC with platelets differential.  Procedure                               Abnormality         Status                     ---------                               -----------         ------                     CBC with platelets and d...[[id number]]  Abnormal            Final result                 Please view results for these tests on the individual orders.     Medications   lactated ringers BOLUS 1,000 mL (1,000 mLs Intravenous $New Bag 8/21/23 0635)   lactated ringers BOLUS 1,000 mL (0 mLs Intravenous Stopped 8/21/23 0456)   ketorolac (TORADOL) injection 15 mg (15 mg Intravenous $Given 8/21/23 0331)   lactated ringers BOLUS 1,000 mL (0 mLs Intravenous Stopped 8/21/23 0603)   iopamidol (ISOVUE-370) solution 99 mL (99 mLs Intravenous $Given 8/21/23 0426)   sodium chloride 0.9 % bag 500mL for CT scan flush use (75 mLs Intravenous $Given 8/21/23 0426)   HYDROmorphone (PF) (DILAUDID) injection 0.5 mg (0.5 mg Intravenous $Given 8/21/23 0606)   ondansetron (ZOFRAN) injection 4 mg (4 mg Intravenous $Given 8/21/23 0636)     Labs  Ordered and Resulted from Time of ED Arrival to Time of ED Departure   COMPREHENSIVE METABOLIC PANEL - Abnormal       Result Value    Sodium 140      Potassium 3.4      Chloride 102      Carbon Dioxide (CO2) 22      Anion Gap 16 (*)     Urea Nitrogen 6.3      Creatinine 0.51      Calcium 8.9      Glucose 123 (*)     Alkaline Phosphatase 62       (*)     ALT 52 (*)     Protein Total 6.7      Albumin 4.4      Bilirubin Total 0.4      GFR Estimate >90     ROUTINE UA WITH MICROSCOPIC REFLEX TO CULTURE - Abnormal    Color Urine Light Yellow      Appearance Urine Clear      Glucose Urine Negative      Bilirubin Urine Negative      Ketones Urine Trace (*)     Specific Gravity Urine 1.039 (*)     Blood Urine Negative      pH Urine 6.5      Protein Albumin Urine Negative      Urobilinogen Urine Normal      Nitrite Urine Negative      Leukocyte Esterase Urine Negative      Mucus Urine Present (*)     RBC Urine 1      WBC Urine 1     LACTIC ACID WHOLE BLOOD - Abnormal    Lactic Acid 3.0 (*)    CBC WITH PLATELETS AND DIFFERENTIAL - Abnormal    WBC Count 3.0 (*)     RBC Count 3.78 (*)     Hemoglobin 12.7      Hematocrit 37.0      MCV 98      MCH 33.6 (*)     MCHC 34.3      RDW 12.9      Platelet Count 100 (*)     % Neutrophils 45      % Lymphocytes 40      % Monocytes 10      % Eosinophils 4      % Basophils 1      % Immature Granulocytes 0      NRBCs per 100 WBC 0      Absolute Neutrophils 1.3 (*)     Absolute Lymphocytes 1.2      Absolute Monocytes 0.3      Absolute Eosinophils 0.1      Absolute Basophils 0.0      Absolute Immature Granulocytes 0.0      Absolute NRBCs 0.0     LACTIC ACID WHOLE BLOOD - Abnormal    Lactic Acid 4.0 (*)    LIPASE - Normal    Lipase 33     HCG QUALITATIVE PREGNANCY - Normal    hCG Serum Qualitative Negative     LACTIC ACID WHOLE BLOOD     CT Abdomen Pelvis w Contrast   Final Result   IMPRESSION:    1.  Advanced hepatic steatosis. No acute findings in the abdomen and pelvis.      US Pelvis  Cmplt w Transvag & Doppler LmtPel Duplex Limited    (Results Pending)          Critical care was performed.   Critical Care Addendum  My initial assessment, based on my review of prehospital provider report, review of nursing observations, review of vital signs, focused history, physical exam, discussion with ob/gyn, and interpretation of multiple lab test and imaging test , established a high suspicion that Yuliana Licona has  severe abdominal pain, pelvic pain, lactic acidosis , which requires immediate intervention, and therefore she is critically ill.     After the initial assessment, the care team initiated multiple lab tests, initiated IV fluid administration, and consulted with ob/gyn  to provide stabilization care. Due to the critical nature of this patient, I reassessed nursing observations, vital signs, physical exam, review of cardiac rhythm monitor, mental status, neurologic status, and respiratory status multiple times prior to her disposition.     Time also spent performing documentation, reviewing test results, discussion with consultants, and coordination of care.     Critical care time (excluding teaching time and procedures): 60 minutes.     Assessment & Plan    Yuliana Licona is a 33 year old female who has a past medical history of ruptured appendicitis complicated by postoperative abscess, ovarian torsion status post right salpingo-oophorectomy who presents the emergency department with a complaint of abdominal pain.  Upon arrival patient is nontoxic-appearing, afebrile, mild distress secondary to pain.  Patient reports improvement of her symptoms after vomiting prior to arrival.  Differential diagnosis includes but is not limited to diverticulitis versus cystitis versus obstruction versus colitis versus nonspecific abdominal pain versus gastroenteritis versus pregnancy versus ovarian//torsion among others.  Upon arrival an IV is established, patient received Zofran prior to arrival, patient  was treated with IV fluid bolus x 2, IV Toradol, Dilaudid.    I reviewed comprehensive labs remarkable for elevated lactic acid of 3.0, white blood cell count 3, hemoglobin 12.7, anion gap elevated at 16.  Urinalysis with no evidence of acute infection, lipase 33, negative pregnancy test.  Given patient's lower abdominal pain, lactic acidosis CT scan was performed.  I reviewed CT scan of the abdomen pelvis which demonstrates advanced hepatosteatosis but no other acute findings.  I discussed results with patient.  On reevaluation patient does report improvement of her symptoms however does report ongoing pain.  Given patient's history of ovarian torsion on the right, will proceed with pelvic ultrasound to rule out torsion on the left.  Repeat lactate after after 2 L IV fluid bolus was 4.  Patient remains afebrile, hemodynamically stable, no distress.  Nontoxic-appearing no signs of acute infection on laboratory testing, urinalysis, CT scan.  Patient reports improvement of her symptoms.  We will give an additional 1 L IV fluid bolus, ultrasound pending, will plan for repeat lactic.    Signed out to morning provider pending repeat lactic, ultrasound, reevaluation, and final disposition.        I have reviewed the nursing notes. I have reviewed the findings, diagnosis, plan and need for follow up with the patient.    New Prescriptions    No medications on file       Final diagnoses:   Lower abdominal pain       Nelida Coombs MD  [facility] EMERGENCY DEPARTMENT  8/21/2023     Nelida Coombs MD  08/22/23 0002

## 2023-08-21 NOTE — ED TRIAGE NOTES
"Pt presents to the ER BIB EMS coming from for complain of sudden lower abdominal Pain. Pt states that \" I went to bed fine and woke up with sharp lower abdominal pain\" Pt endorses accompanying N/V. EMS Gave 4 of Zofran en-route, .      Triage Assessment       Row Name 08/21/23 0300       Triage Assessment (Adult)    Airway WDL WDL       Respiratory WDL    Respiratory WDL WDL       Skin Circulation/Temperature WDL    Skin Circulation/Temperature WDL WDL       Cardiac WDL    Cardiac WDL WDL       Peripheral/Neurovascular WDL    Peripheral Neurovascular WDL WDL       Cognitive/Neuro/Behavioral WDL    Cognitive/Neuro/Behavioral WDL WDL                    "

## 2023-08-21 NOTE — CONSULTS
[facility]    Consult Note - EGS Service  Date of Admission:  8/21/2023  Consult Requested by: ED staff  Reason for Consult: Acute LLQ pain, lactic acidosis    Assessment & Plan: Surgery   Yuliana Licona is a 33 year old female admitted on 8/21/2023. Pt presenting with acute intermittent inferolateral LLQ pain with unremarkable imaging as well as mild LLQ tenderness. Abd not peritonitic nor distended on exam.     - no acute surgical intervention  - EGS will sign off at this time      Drains: PRESENT          - May have additional drains, review Avatar    Clinically Significant Risk Factors Present on Admission          # Hypocalcemia: Lowest Ca = 8.4 mg/dL in last 2 days, will monitor and replace as appropriate       # Thrombocytopenia: Lowest platelets = 89 in last 2 days, will monitor for bleeding                 The patient's care was discussed with the the attending on service.    Marvel Noyola MD  [facility]  Non-urgent messages: Securely message with [employer] (more info)  Text page via [employer] Paging/Directory     ______________________________________________________________________    Chief Complaint   LLQ pain    History is obtained from the patient    History of Present Illness   Yuliana Licona is a 33 year old female w/ pmhx of perforated appendicitis s/p ex lap w/ appendectomy (2018) c/b intra-abdominal abscess and R ovarian torsion s/p ex lap with R salpingo-oophorectomy who presents acute LLQ pain.      Patient reports being awoken with LLQ sharp abdominal pain this morning at 3am. She states that this pain is intermittent (last a few minute) and is non-radiating. She denies any precipitating events. She reports having an episode of nausea and NBNB emesis earlier today that she stated improved her pain. She also reports passing flatus mitigated her pain. She states that since her episode of emesis, the  "peak of her pain has been a 6/10. Her last BM was \"small and hard\" yesterday evening. She reports having 2 BM/day usually. She denies any fevers/chills, cough, dyspnea, chest pain, constipation, diarrhea, dysuria, vaginal discharge, or vaginal bleeding. In the ED, her vitals were wnl with labs demonstrating an anion gap of 18 with wbc of 3K, and lactic acid of 4.0.  She was given 2L of LR, analgesics, and started on mIVFs.     Past Medical History    No past medical history on file.    Past Surgical History   Past Surgical History:   Procedure Laterality Date    APPENDECTOMY      07/18    LAPAROTOMY EXPLORATORY N/A 1/18/2018    Procedure: LAPAROTOMY EXPLORATORY;  Exploratory Laparotomy, Appendectomy;  Surgeon: Brianna Guthrie MD;  Location: [facility] OR    LAPAROTOMY EXPLORATORY Right 9/5/2020    Procedure: Right Mini Laparotomy with Right Salpingo-oophorectomy;  Surgeon: Sade Bergman MD;  Location: [facility] OR     Prior to Admission Medications   (Not in a hospital admission)      Physical Exam   Vital Signs: Temp: 98.1  F (36.7  C) Temp src: Oral BP: (!) 136/92 Pulse: 76   Resp: 16 SpO2: 97 % O2 Device: None (Room air)    Weight: 160 lbs 0 ozNo intake or output data in the 24 hours ending 08/21/23 1315  GENERAL: NAD, resting comfortably in bed  HEENT: atraumatic, normocephalic, no scleral icterus  CARDIO: normal rate and regular rhythm, no LE edema  PULM: no increased WOB on RA  ABD: non-distended, soft, and mild inferolateral LLQ tenderness  NEURO: CN II-XII grossly intact, no focal neuro deficits  PSYCH: appropriate affect and behavior    Data     I have personally reviewed the following data over the past 24 hrs:    4.0  \   12.9   / 89 (L)     137 99 3.1 (L) /  94   3.7 20 (L) 0.42 (L) \     ALT: 51 (H) AST: 119 (H) AP: 59 TBILI: 0.8   ALB: 4.3 TOT PROTEIN: 6.6 LIPASE: 33     Procal: 0.10 (H) CRP: <3.00 Lactic Acid: 3.1 (H)       Ferritin:  N/A % Retic:  2.6 (H) LDH:  N/A       Imaging results reviewed over " the past 24 hrs:   Recent Results (from the past 24 hour(s))   CT Abdomen Pelvis w Contrast    Narrative    EXAM: CT ABDOMEN PELVIS W CONTRAST  LOCATION: [facility]  DATE: 8/21/2023    INDICATION: Lower abdominal pain, vomiting, lactic acidosis  COMPARISON: 09/05/2020  TECHNIQUE: CT scan of the abdomen and pelvis was performed following injection of IV contrast. Multiplanar reformats were obtained. Dose reduction techniques were used.  CONTRAST: 99 ml isovue 370    FINDINGS:   LOWER CHEST: Normal.    HEPATOBILIARY: Normal size of the liver with advanced steatosis. No focal lesions. Gallbladder is normal.    PANCREAS: Normal.    SPLEEN: Normal.    ADRENAL GLANDS: Normal.    KIDNEYS/BLADDER: Normal.    BOWEL: No focal inflammation or obstruction. Appendix is surgically absent.    LYMPH NODES: Normal.    VASCULATURE: Unremarkable.    PELVIC ORGANS: Normal. Multiple follicles are present in the left ovary. Previous large right ovarian dermoid has been removed.    MUSCULOSKELETAL: Thinning and diastases of the lower ventral abdomen redemonstrated, unchanged. No suspicious osseous lesions or acute fractures..      Impression    IMPRESSION:   1.  Advanced hepatic steatosis. No acute findings in the abdomen and pelvis.   US Pelvis Cmplt w Transvag & Doppler LmtPel Duplex Limited    Narrative    EXAM: Pelvic ultrasound 8/21/2023 8:28 AM     HISTORY: left pelvic pain, r/o torsion, hx of right sided torsion.     COMPARISON: Seen in the abdomen/pelvis CT with contrast.     TECHNIQUE: The pelvis was scanned in standard fashion with  transabdominal and transvaginal transducer(s) using grayscale, color  Doppler, and spectral flow techniques.    FINDINGS:    UTERUS: The uterus measures 5.9 x 5.0 x 3.0 cm, and there is no  evidence of a focal fibroid or suspicious mass. Normal uterine  parenchymal echotexture. The endometrium is within normal limits and  measures 1 mm. There is no free  fluid in the pelvis.    RIGHT OVARY: Not visualized.    LEFT OVARY: The left ovary measures 2.9 x 4.1 x 1.5 cm. Normal  follicular architecture is visualized bilaterally. No suspicious  adnexal mass. There is normal arterial Doppler flow.     BLADDER: Moderately distended and unremarkable.       Impression    IMPRESSION: Normal pelvic ultrasound. Please note, the right ovary was  not visualized.    I have personally reviewed the examination and initial interpretation  and I agree with the findings.    INDY COLINDRES DO         SYSTEM ID:  [id number]

## 2023-08-22 VITALS
RESPIRATION RATE: 16 BRPM | HEIGHT: 69 IN | DIASTOLIC BLOOD PRESSURE: 93 MMHG | OXYGEN SATURATION: 98 % | BODY MASS INDEX: 23.87 KG/M2 | SYSTOLIC BLOOD PRESSURE: 144 MMHG | HEART RATE: 78 BPM | WEIGHT: 161.16 LBS | TEMPERATURE: 98.2 F

## 2023-08-22 LAB
ALBUMIN SERPL BCG-MCNC: 3.8 G/DL (ref 3.5–5.2)
ALP SERPL-CCNC: 59 U/L (ref 35–104)
ALT SERPL W P-5'-P-CCNC: 52 U/L (ref 0–50)
ANION GAP SERPL CALCULATED.3IONS-SCNC: 12 MMOL/L (ref 7–15)
AST SERPL W P-5'-P-CCNC: 110 U/L (ref 0–45)
BILIRUB SERPL-MCNC: 0.9 MG/DL
BUN SERPL-MCNC: 3.1 MG/DL (ref 6–20)
CALCIUM SERPL-MCNC: 8.5 MG/DL (ref 8.6–10)
CHLORIDE SERPL-SCNC: 100 MMOL/L (ref 98–107)
CREAT SERPL-MCNC: 0.5 MG/DL (ref 0.51–0.95)
DEPRECATED HCO3 PLAS-SCNC: 24 MMOL/L (ref 22–29)
ERYTHROCYTE [DISTWIDTH] IN BLOOD BY AUTOMATED COUNT: 12.7 % (ref 10–15)
GFR SERPL CREATININE-BSD FRML MDRD: >90 ML/MIN/1.73M2
GLUCOSE SERPL-MCNC: 76 MG/DL (ref 70–99)
HCT VFR BLD AUTO: 37.4 % (ref 35–47)
HGB BLD-MCNC: 13.1 G/DL (ref 11.7–15.7)
MCH RBC QN AUTO: 34.3 PG (ref 26.5–33)
MCHC RBC AUTO-ENTMCNC: 35 G/DL (ref 31.5–36.5)
MCV RBC AUTO: 98 FL (ref 78–100)
PATH REPORT.COMMENTS IMP SPEC: NORMAL
PATH REPORT.FINAL DX SPEC: NORMAL
PATH REPORT.MICROSCOPIC SPEC OTHER STN: NORMAL
PATH REPORT.MICROSCOPIC SPEC OTHER STN: NORMAL
PATH REPORT.RELEVANT HX SPEC: NORMAL
PLATELET # BLD AUTO: 101 10E3/UL (ref 150–450)
POTASSIUM SERPL-SCNC: 3.8 MMOL/L (ref 3.4–5.3)
PROT SERPL-MCNC: 6 G/DL (ref 6.4–8.3)
RBC # BLD AUTO: 3.82 10E6/UL (ref 3.8–5.2)
SODIUM SERPL-SCNC: 136 MMOL/L (ref 136–145)
WBC # BLD AUTO: 3.3 10E3/UL (ref 4–11)

## 2023-08-22 PROCEDURE — 85027 COMPLETE CBC AUTOMATED: CPT | Performed by: PHYSICIAN ASSISTANT

## 2023-08-22 PROCEDURE — G0378 HOSPITAL OBSERVATION PER HR: HCPCS

## 2023-08-22 PROCEDURE — 99207 PR NO BILLABLE SERVICE THIS VISIT: CPT | Performed by: PEDIATRICS

## 2023-08-22 PROCEDURE — 36415 COLL VENOUS BLD VENIPUNCTURE: CPT | Performed by: PHYSICIAN ASSISTANT

## 2023-08-22 PROCEDURE — 258N000003 HC RX IP 258 OP 636: Performed by: PHYSICIAN ASSISTANT

## 2023-08-22 PROCEDURE — 99239 HOSP IP/OBS DSCHRG MGMT >30: CPT | Performed by: PEDIATRICS

## 2023-08-22 PROCEDURE — 80053 COMPREHEN METABOLIC PANEL: CPT | Performed by: PHYSICIAN ASSISTANT

## 2023-08-22 RX ADMIN — SODIUM CHLORIDE: 9 INJECTION, SOLUTION INTRAVENOUS at 05:23

## 2023-08-22 ASSESSMENT — ACTIVITIES OF DAILY LIVING (ADL)
ADLS_ACUITY_SCORE: 18

## 2023-08-22 NOTE — PROGRESS NOTES
6MS DISCHARGE    D: Patient discharged to home at 12pm. Patient accompanied by self.    I: All discharge medications and instructions reviewed with patient. Patient instructed to seek care if experiencing worsening symptoms. Other phone numbers to call with questions or concerns after discharge reviewed. PIV removed. Education completed.    A: Patient verbalized understanding of discharge medications and instructions. Prescribed home medications given to patient.  Belongings returned to patient.    P: Patient to follow-up within 7 days of discharge with primary MD.

## 2023-08-22 NOTE — PLAN OF CARE
[phone]    Pt alert and oriented X 4. Able to make needs known and use call light appropriately. VSS, on RA. PIV patent, NS infusing at 125 ml/hr. Denied SOB, chest pain, nausea, vomiting, abdominal pain/discomfort, hallucination and dizziness. C/o headaches which was relief with prn Tylenol. Up independently to bathroom. Voiding without discomfort, reported having a BM earlier during the shift. Slept most of the night, no acute changes noted. Will continue to monitor and follow POC.

## 2023-08-22 NOTE — DISCHARGE INSTRUCTIONS
I placed a referral to our liver doctors here at the [place]. I think you should still establish care with a primary physician, but it may be helpful to talk with the liver specialists about additional measures you can take to keep your liver healthy and prevent further issues. You should be contacted in the next week or so - if no one calls to schedule an appointment call the number included in this packet.

## 2023-08-22 NOTE — UTILIZATION REVIEW
"[facility] Utilization Review  Admission Status; Secondary Review Determination     Admission Date: 8/21/2023  2:58 AM      Under the authority of the Utilization Management Committee, the utilization review process indicated a secondary review on the above patient.  The review outcome is based on review of the medical records, discussions with staff, and applying clinical experience noted on the date of the review.        (X) Observation Status Appropriate - This patient does not meet hospital inpatient criteria and is placed in observation status. If this patient's primary payer is Medicare and was admitted as an inpatient, Condition Code 44 should be used and patient status changed to \"observation\".   () Observation Status concurrent Review           RATIONALE FOR DETERMINATION   33-year-old female admitted with acute abdominal pain, nausea and vomiting, with hard bowel movement found to have elevated lactic acid, imaging unremarkable, labs including CRP, procalcitonin, lipase normal, urinalysis negative for infection.  Patient does have history of alcohol abuse, was tremulous on admission, started on CIWA protocol and CIWA scores have been improving from 11-6-5-3 yesterday evening.  Patient has not received any Ativan since early this morning.  Complex patient who is now improving, has not required any benzodiazepines for withdrawal, is on regular diet, had a bowel movement, does not meet criteria for inpatient stay, recommend change to observation status, communicated to Dr. Tuttle      The severity of illness, intensity of service provided, expected LOS make the care appropriate for observation status at this time.        The information on this document is developed by the utilization review team in order for the business office to ensure compliance.  This only denotes the appropriateness of proper admission status and does not reflect the quality of care rendered.              Sincerely, "       Valeria Gaona MD  Physician Advisor  Utilization Review-[place]    Phone: [phone]

## 2023-08-22 NOTE — DISCHARGE SUMMARY
[facility]  Hospitalist Discharge Summary      Date of Admission:  8/21/2023  Date of Discharge:  8/22/2023 12:07 PM  Discharging Provider: Santos Tuttle DO  Discharge Service: Hospitalist Service, GOLD TEAM 22    Discharge Diagnoses   Left lower quadrant abdominal pain  Nausea, vomiting  Concern for alcohol withdrawal      Follow-ups Needed After Discharge   Follow-up Appointments     Follow Up and recommended labs and tests      Please follow up with a primary care physician.     You should have your primary care doctor order LFT's (Liver function   tests) to ensure yours have returned to normal.     You should consider abstinence from alcohol - this can make fatty   infiltration of the liver worse and leads to continue damage, even liver   failure.        Patient to establish care with a PCP. Will need follow up on LFT's.    Unresulted Labs Ordered in the Past 30 Days of this Admission       Date and Time Order Name Status Description    8/21/2023 10:43 AM Ehrlichia Anaplasma Sp by PCR In process     8/21/2023 10:42 AM Bld morphology pathology review In process         These results will be followed up by hospitalist, pcp    Discharge Disposition   Discharged to home  Condition at discharge: Stable    Hospital Course   34 yo F awoke 8/21 with severe sharp LLQ abdominal pain, intermittent, caused episode of emesis after which it improved. Presented to ED where she was evaluated by surgery and then admitted to medicine. Admitting providers felt she may be at risk for alcohol withdrawal (admitted to 2-3 alcoholic drinks daily) and started CIWA. By the time I saw her the following day she was not scoring on CIWA and her last dose of lorazepam had been the day prior (21 h prior to discharge). Her abdominal pain resolved with supportive measures and she was able to tolerate a meal prior to discharge. She had elevated LFTs with low platelets, imaging consistent with  progression of metabolic dysfunction associated steatotic liver disease (formerly NAFLD). Referral was made to hepatology at discharge and patient was encouraged to stop drinking alcohol, also counseled that 10% weight loss helps ameliorate this condition.     Given intermittent nature of symptoms and history of abdominal surgeries (perf'd appy, ovarian torsion) suspect that volvulus or intussusception was possible and that it resolved on its own. Should patient return would pursue stat abdominal ultrasound to confirm.    Consultations This Hospital Stay   None    Code Status   Prior    Time Spent on this Encounter   I, Santos Tuttle DO, personally saw the patient today and spent greater than 30 minutes discharging this patient.       Santos Tuttle DO  [facility] MED SURG  [facility] [address]  Phone: [phone]  Fax: [phone]  ______________________________________________________________________    Physical Exam   Vital Signs: Temp: 98.2  F (36.8  C) Temp src: Oral BP: (!) 144/93 Pulse: 78   Resp: 16 SpO2: 98 % O2 Device: None (Room air)    Weight: 161 lbs 2.5 oz    lying in bed in no distress  Awake, alert, answers questions and follows commands  Respiratory rate and work of breathing are normal  Abdomen is soft, non tender, non distended  Extremities are without edema or cyanosis         Primary Care Physician   Physician No Ref-Primary    Discharge Orders      Adult GI  Referral - Consult Only      Reason for your hospital stay    You were hospitalized for abdominal pain with vomiting and elevated liver enzymes.     Activity    Your activity upon discharge: activity as tolerated     Follow Up and recommended labs and tests    Please follow up with a primary care physician.     You should have your primary care doctor order LFT's (Liver function tests) to ensure yours have returned to normal.     You should consider abstinence from alcohol - this  can make fatty infiltration of the liver worse and leads to continue damage, even liver failure.     Diet    Follow this diet upon discharge: Orders Placed This Encounter      Combination Diet Regular Diet Adult       Significant Results and Procedures   Most Recent 3 CBC's:  Recent Labs   Lab Test 08/22/23  0707 08/21/23  1042 08/21/23  0306   WBC 3.3* 4.0 3.0*   HGB 13.1 12.9 12.7   MCV 98 99 98   * 89* 100*     Most Recent 3 BMP's:  Recent Labs   Lab Test 08/22/23  0707 08/21/23  1452 08/21/23  1042    138 137   POTASSIUM 3.8 3.6 3.7   CHLORIDE 100 98 99   CO2 24 26 20*   BUN 3.1* 3.8* 3.1*   CR 0.50* 0.47* 0.42*   ANIONGAP 12 14 18*   MOE 8.5* 8.9 8.4*   GLC 76 88 94     Most Recent 2 LFT's:  Recent Labs   Lab Test 08/22/23  0707 08/21/23  1452   * 116*   ALT 52* 55*   ALKPHOS 59 66   BILITOTAL 0.9 1.2   ,   Results for orders placed or performed during the hospital encounter of 08/21/23   CT Abdomen Pelvis w Contrast    Narrative    EXAM: CT ABDOMEN PELVIS W CONTRAST  LOCATION: [facility]  DATE: 8/21/2023    INDICATION: Lower abdominal pain, vomiting, lactic acidosis  COMPARISON: 09/05/2020  TECHNIQUE: CT scan of the abdomen and pelvis was performed following injection of IV contrast. Multiplanar reformats were obtained. Dose reduction techniques were used.  CONTRAST: 99 ml isovue 370    FINDINGS:   LOWER CHEST: Normal.    HEPATOBILIARY: Normal size of the liver with advanced steatosis. No focal lesions. Gallbladder is normal.    PANCREAS: Normal.    SPLEEN: Normal.    ADRENAL GLANDS: Normal.    KIDNEYS/BLADDER: Normal.    BOWEL: No focal inflammation or obstruction. Appendix is surgically absent.    LYMPH NODES: Normal.    VASCULATURE: Unremarkable.    PELVIC ORGANS: Normal. Multiple follicles are present in the left ovary. Previous large right ovarian dermoid has been removed.    MUSCULOSKELETAL: Thinning and diastases of the lower ventral  abdomen redemonstrated, unchanged. No suspicious osseous lesions or acute fractures..      Impression    IMPRESSION:   1.  Advanced hepatic steatosis. No acute findings in the abdomen and pelvis.   US Pelvis Cmplt w Transvag & Doppler LmtPel Duplex Limited    Narrative    EXAM: Pelvic ultrasound 8/21/2023 8:28 AM     HISTORY: left pelvic pain, r/o torsion, hx of right sided torsion.     COMPARISON: Seen in the abdomen/pelvis CT with contrast.     TECHNIQUE: The pelvis was scanned in standard fashion with  transabdominal and transvaginal transducer(s) using grayscale, color  Doppler, and spectral flow techniques.    FINDINGS:    UTERUS: The uterus measures 5.9 x 5.0 x 3.0 cm, and there is no  evidence of a focal fibroid or suspicious mass. Normal uterine  parenchymal echotexture. The endometrium is within normal limits and  measures 1 mm. There is no free fluid in the pelvis.    RIGHT OVARY: Not visualized.    LEFT OVARY: The left ovary measures 2.9 x 4.1 x 1.5 cm. Normal  follicular architecture is visualized bilaterally. No suspicious  adnexal mass. There is normal arterial Doppler flow.     BLADDER: Moderately distended and unremarkable.       Impression    IMPRESSION: Normal pelvic ultrasound. Please note, the right ovary was  not visualized.    I have personally reviewed the examination and initial interpretation  and I agree with the findings.    INDY COLINDRES DO         SYSTEM ID:  [id number]       Discharge Medications   There are no discharge medications for this patient.    Allergies   No Known Allergies

## 2023-08-23 LAB
A PHAGOCYTOPH DNA BLD QL NAA+PROBE: NOT DETECTED
E CHAFFEENSIS DNA BLD QL NAA+PROBE: NOT DETECTED
E EWINGII DNA SPEC QL NAA+PROBE: NOT DETECTED
EHRLICHIA DNA SPEC QL NAA+PROBE: NOT DETECTED

## 2023-08-24 ENCOUNTER — PATIENT OUTREACH (OUTPATIENT)
Dept: CARE COORDINATION | Facility: CLINIC | Age: 33
End: 2023-08-24
Payer: COMMERCIAL

## 2023-08-24 NOTE — PROGRESS NOTES
Clinic Care Coordination Contact  [facility] [email or web address]]/[facility]       Clinical Data: Care Coordinator Outreach  Outreach attempted x 2. Unable to leave a message Mailbox is full.    Care Coordinator will do no further outreaches at this time.    Misty Sheffield  [phone]  Care

## 2023-09-30 ENCOUNTER — APPOINTMENT (OUTPATIENT)
Dept: GENERAL RADIOLOGY | Facility: CLINIC | Age: 33
End: 2023-09-30
Attending: EMERGENCY MEDICINE
Payer: COMMERCIAL

## 2023-09-30 ENCOUNTER — HOSPITAL ENCOUNTER (EMERGENCY)
Facility: CLINIC | Age: 33
Discharge: HOME | End: 2023-09-30
Attending: EMERGENCY MEDICINE | Admitting: EMERGENCY MEDICINE
Payer: COMMERCIAL

## 2023-09-30 VITALS
OXYGEN SATURATION: 97 % | TEMPERATURE: 98.6 F | DIASTOLIC BLOOD PRESSURE: 87 MMHG | WEIGHT: 161 LBS | HEART RATE: 102 BPM | SYSTOLIC BLOOD PRESSURE: 121 MMHG | BODY MASS INDEX: 23.78 KG/M2 | RESPIRATION RATE: 18 BRPM

## 2023-09-30 DIAGNOSIS — R07.89 CHEST PAIN, NON-CARDIAC: ICD-10-CM

## 2023-09-30 DIAGNOSIS — K59.00 CONSTIPATION, UNSPECIFIED CONSTIPATION TYPE: ICD-10-CM

## 2023-09-30 DIAGNOSIS — R10.10 PAIN OF UPPER ABDOMEN: ICD-10-CM

## 2023-09-30 LAB
ALBUMIN SERPL BCG-MCNC: 4.6 G/DL (ref 3.5–5.2)
ALBUMIN UR-MCNC: 50 MG/DL
ALP SERPL-CCNC: 69 U/L (ref 35–104)
ALT SERPL W P-5'-P-CCNC: 32 U/L (ref 0–50)
ANION GAP SERPL CALCULATED.3IONS-SCNC: 14 MMOL/L (ref 7–15)
APPEARANCE UR: CLEAR
AST SERPL W P-5'-P-CCNC: 60 U/L (ref 0–45)
ATRIAL RATE - MUSE: 86 BPM
BASOPHILS # BLD AUTO: 0.1 10E3/UL (ref 0–0.2)
BASOPHILS NFR BLD AUTO: 1 %
BILIRUB SERPL-MCNC: 1.2 MG/DL
BILIRUB UR QL STRIP: NEGATIVE
BUN SERPL-MCNC: 6.6 MG/DL (ref 6–20)
CALCIUM SERPL-MCNC: 9.1 MG/DL (ref 8.6–10)
CHLORIDE SERPL-SCNC: 104 MMOL/L (ref 98–107)
COLOR UR AUTO: YELLOW
CREAT SERPL-MCNC: 0.6 MG/DL (ref 0.51–0.95)
DEPRECATED HCO3 PLAS-SCNC: 24 MMOL/L (ref 22–29)
DIASTOLIC BLOOD PRESSURE - MUSE: NORMAL MMHG
EGFRCR SERPLBLD CKD-EPI 2021: >90 ML/MIN/1.73M2
EOSINOPHIL # BLD AUTO: 0.1 10E3/UL (ref 0–0.7)
EOSINOPHIL NFR BLD AUTO: 1 %
ERYTHROCYTE [DISTWIDTH] IN BLOOD BY AUTOMATED COUNT: 12.7 % (ref 10–15)
GLUCOSE SERPL-MCNC: 84 MG/DL (ref 70–99)
GLUCOSE UR STRIP-MCNC: NEGATIVE MG/DL
HCG UR QL: NEGATIVE
HCT VFR BLD AUTO: 42.1 % (ref 35–47)
HGB BLD-MCNC: 14.9 G/DL (ref 11.7–15.7)
HGB UR QL STRIP: NEGATIVE
HYALINE CASTS: 1 /LPF
IMM GRANULOCYTES # BLD: 0 10E3/UL
IMM GRANULOCYTES NFR BLD: 0 %
INTERPRETATION ECG - MUSE: NORMAL
KETONES UR STRIP-MCNC: 40 MG/DL
LEUKOCYTE ESTERASE UR QL STRIP: NEGATIVE
LIPASE SERPL-CCNC: 19 U/L (ref 13–60)
LYMPHOCYTES # BLD AUTO: 1.7 10E3/UL (ref 0.8–5.3)
LYMPHOCYTES NFR BLD AUTO: 39 %
MCH RBC QN AUTO: 35.3 PG (ref 26.5–33)
MCHC RBC AUTO-ENTMCNC: 35.4 G/DL (ref 31.5–36.5)
MCV RBC AUTO: 100 FL (ref 78–100)
MONOCYTES # BLD AUTO: 0.3 10E3/UL (ref 0–1.3)
MONOCYTES NFR BLD AUTO: 7 %
MUCOUS THREADS #/AREA URNS LPF: PRESENT /LPF
NEUTROPHILS # BLD AUTO: 2.3 10E3/UL (ref 1.6–8.3)
NEUTROPHILS NFR BLD AUTO: 52 %
NITRATE UR QL: NEGATIVE
NRBC # BLD AUTO: 0 10E3/UL
NRBC BLD AUTO-RTO: 0 /100
P AXIS - MUSE: 51 DEGREES
PH UR STRIP: 5.5 [PH] (ref 5–7)
PLATELET # BLD AUTO: 261 10E3/UL (ref 150–450)
POTASSIUM SERPL-SCNC: 4.1 MMOL/L (ref 3.4–5.3)
PR INTERVAL - MUSE: 138 MS
PROT SERPL-MCNC: 7.3 G/DL (ref 6.4–8.3)
QRS DURATION - MUSE: 88 MS
QT - MUSE: 360 MS
QTC - MUSE: 430 MS
R AXIS - MUSE: 50 DEGREES
RBC # BLD AUTO: 4.22 10E6/UL (ref 3.8–5.2)
RBC URINE: 1 /HPF
SODIUM SERPL-SCNC: 142 MMOL/L (ref 135–145)
SP GR UR STRIP: 1.02 (ref 1–1.03)
SQUAMOUS EPITHELIAL: 2 /HPF
SYSTOLIC BLOOD PRESSURE - MUSE: NORMAL MMHG
T AXIS - MUSE: 48 DEGREES
TROPONIN T SERPL HS-MCNC: <6 NG/L
UROBILINOGEN UR STRIP-MCNC: NORMAL MG/DL
VENTRICULAR RATE- MUSE: 86 BPM
WBC # BLD AUTO: 4.5 10E3/UL (ref 4–11)
WBC URINE: 2 /HPF

## 2023-09-30 PROCEDURE — 84484 ASSAY OF TROPONIN QUANT: CPT | Performed by: EMERGENCY MEDICINE

## 2023-09-30 PROCEDURE — 36415 COLL VENOUS BLD VENIPUNCTURE: CPT | Performed by: EMERGENCY MEDICINE

## 2023-09-30 PROCEDURE — 71046 X-RAY EXAM CHEST 2 VIEWS: CPT

## 2023-09-30 PROCEDURE — 99284 EMERGENCY DEPT VISIT MOD MDM: CPT | Performed by: EMERGENCY MEDICINE

## 2023-09-30 PROCEDURE — 81025 URINE PREGNANCY TEST: CPT | Performed by: EMERGENCY MEDICINE

## 2023-09-30 PROCEDURE — 81001 URINALYSIS AUTO W/SCOPE: CPT | Performed by: EMERGENCY MEDICINE

## 2023-09-30 PROCEDURE — 96360 HYDRATION IV INFUSION INIT: CPT | Performed by: EMERGENCY MEDICINE

## 2023-09-30 PROCEDURE — 99285 EMERGENCY DEPT VISIT HI MDM: CPT | Mod: 25 | Performed by: EMERGENCY MEDICINE

## 2023-09-30 PROCEDURE — 93005 ELECTROCARDIOGRAM TRACING: CPT | Performed by: EMERGENCY MEDICINE

## 2023-09-30 PROCEDURE — 258N000003 HC RX IP 258 OP 636: Performed by: EMERGENCY MEDICINE

## 2023-09-30 PROCEDURE — 85025 COMPLETE CBC W/AUTO DIFF WBC: CPT | Performed by: EMERGENCY MEDICINE

## 2023-09-30 PROCEDURE — 83690 ASSAY OF LIPASE: CPT | Performed by: EMERGENCY MEDICINE

## 2023-09-30 PROCEDURE — 80053 COMPREHEN METABOLIC PANEL: CPT | Performed by: EMERGENCY MEDICINE

## 2023-09-30 RX ORDER — MAGNESIUM HYDROXIDE/ALUMINUM HYDROXICE/SIMETHICONE 120; 1200; 1200 MG/30ML; MG/30ML; MG/30ML
15 SUSPENSION ORAL ONCE
Status: COMPLETED | OUTPATIENT
Start: 2023-09-30 | End: 2023-09-30

## 2023-09-30 RX ORDER — ONDANSETRON 4 MG/1
4 TABLET, ORALLY DISINTEGRATING ORAL EVERY 8 HOURS PRN
Qty: 10 TABLET | Refills: 0 | Status: SHIPPED | OUTPATIENT
Start: 2023-09-30 | End: 2023-10-05

## 2023-09-30 RX ORDER — AMOXICILLIN 250 MG
1 CAPSULE ORAL DAILY PRN
Qty: 5 TABLET | Refills: 0 | Status: SHIPPED | OUTPATIENT
Start: 2023-09-30 | End: 2023-10-05

## 2023-09-30 RX ADMIN — SODIUM CHLORIDE 1000 ML: 9 INJECTION, SOLUTION INTRAVENOUS at 18:37

## 2023-09-30 ASSESSMENT — ACTIVITIES OF DAILY LIVING (ADL)
ADLS_ACUITY_SCORE: 35
ADLS_ACUITY_SCORE: 35

## 2023-09-30 NOTE — ED TRIAGE NOTES
"Francesco flank and reporting \"Right lung\" pain that began two day s and ago and now having anterior sternal chest pain and epigastric pain for 2 days. When she pushes in on her chest it feels better.  Reports she vomited two times yesterday and last BM was 3 days ago.  No urinary sx.         "

## 2023-09-30 NOTE — ED PROVIDER NOTES
ED Provider Note  [facility]      History     Chief Complaint   Patient presents with    Chest Pain    Back Pain    Abdominal Pain     HPI  Yuliana Licona is a 33 year old female with past medical history of UTI, ruptured appendici status post appendectomy complicated by Intraperitoneal abscess, and ovarian torsion who presents with complaint of nausea, vomiting, abdominal pain radiating up to the chest.  Patient states that she has been in the muscles on both sides of her lower back.  She also has pain in the chest that is worse when she presses on it.  She has had pain in the upper abdomen for the past 2 days.  She vomited 2 times yesterday.  Her last bowel movement was 3 days ago.  When she strains to try to have a bowel movement the pain becomes sharp but is is otherwise dull.  This is unusual for her to go for 3 days without having a bowel movement.  She has not tried anything such as a stool softener or laxative.  She denies urinary frequency, urgency, pain with urination.  She has not had sex in 6 months so she denies chance of pregnancy or STD.  She denies vaginal discharge or bleeding.  Denies fever, cough, shortness of breath.  She does not take an antacid medication.  She has not noticed a change in her pain with food.      Past Medical History  History reviewed. No pertinent past medical history.  Past Surgical History:   Procedure Laterality Date    APPENDECTOMY      07/18    LAPAROTOMY EXPLORATORY N/A 1/18/2018    Procedure: LAPAROTOMY EXPLORATORY;  Exploratory Laparotomy, Appendectomy;  Surgeon: Brianna Guthrie MD;  Location: [facility] OR    LAPAROTOMY EXPLORATORY Right 9/5/2020    Procedure: Right Mini Laparotomy with Right Salpingo-oophorectomy;  Surgeon: Sade Bergman MD;  Location: [facility] OR     ondansetron (ZOFRAN ODT) 4 MG ODT tab  senna-docusate (SENOKOT-S/PERICOLACE) 8.6-50 MG tablet      No Known Allergies  Family History  Family History   Problem Relation  Age of Onset    Cancer No family hx of         no skin cancer     Social History   Social History     Tobacco Use    Smoking status: Never    Smokeless tobacco: Never   Substance Use Topics    Alcohol use: Yes     Comment: 3 drinks/day    Drug use: Yes     Types: Marijuana, Cocaine     Comment: reports regular marijuana use and rare cocaine use         A medically appropriate review of systems was performed with pertinent positives and negatives noted in the HPI, and all other systems negative.    Physical Exam   BP: 121/87  Pulse: 102  Temp: 98.6  F (37  C)  Resp: 18  Weight: 73 kg (161 lb)  SpO2: 97 %  Physical Exam  Vitals and nursing note reviewed.   Constitutional:       General: She is not in acute distress.     Appearance: Normal appearance. She is well-developed. She is not ill-appearing or diaphoretic.   HENT:      Head: Normocephalic and atraumatic.      Nose: Nose normal.      Mouth/Throat:      Mouth: Mucous membranes are moist.   Eyes:      General: No scleral icterus.     Conjunctiva/sclera: Conjunctivae normal.   Cardiovascular:      Rate and Rhythm: Normal rate.   Pulmonary:      Effort: Pulmonary effort is normal. No respiratory distress.      Breath sounds: No stridor.   Chest:      Chest wall: Tenderness present.   Abdominal:      General: Abdomen is flat. A surgical scar is present. There is no distension.      Palpations: Abdomen is soft.      Tenderness: There is generalized abdominal tenderness. There is no guarding or rebound. Negative signs include Dunbar's sign and McBurney's sign.      Hernia: No hernia is present.   Musculoskeletal:         General: No deformity or signs of injury. Normal range of motion.      Cervical back: Normal range of motion and neck supple. No rigidity.      Right lower leg: No edema.      Left lower leg: No edema.   Skin:     General: Skin is warm and dry.      Coloration: Skin is not jaundiced or pale.      Findings: Rash present. No bruising or erythema.       Comments: Scattered papular rash over lower back, extremities, buttock area.   Neurological:      General: No focal deficit present.      Mental Status: She is alert and oriented to person, place, and time.   Psychiatric:         Mood and Affect: Mood normal.         Behavior: Behavior normal.           ED Course, Procedures, & Data      Procedures            EKG Interpretation:      Interpreted by Dorinda Leonard MD  Time reviewed: 1558  Symptoms at time of EKG: Chest pain, abdominal pain  Rhythm: normal sinus with sinus arrhythmia  Rate: normal  Axis: normal  Ectopy: none  Conduction: normal  ST Segments/ T Waves: No ST-T wave changes  Q Waves: none      Clinical Impression: normal EKG                 Results for orders placed or performed during the hospital encounter of 09/30/23   XR Chest 2 Views     Status: None    Narrative    EXAM: XR CHEST 2 VIEWS  LOCATION: [facility]  DATE: 9/30/2023    INDICATION: Chest pain.  COMPARISON: 01/07/2023.      Impression    IMPRESSION: No focal airspace consolidation. No pleural effusion or pneumothorax.    Cardiomediastinal silhouette is normal.   UA with Microscopic reflex to Culture     Status: Abnormal    Specimen: Urine, Midstream   Result Value Ref Range    Color Urine Yellow Colorless, Straw, Light Yellow, Yellow    Appearance Urine Clear Clear    Glucose Urine Negative Negative mg/dL    Bilirubin Urine Negative Negative    Ketones Urine 40 (A) Negative mg/dL    Specific Gravity Urine 1.016 1.003 - 1.035    Blood Urine Negative Negative    pH Urine 5.5 5.0 - 7.0    Protein Albumin Urine 50 (A) Negative mg/dL    Urobilinogen Urine Normal Normal, 2.0 mg/dL    Nitrite Urine Negative Negative    Leukocyte Esterase Urine Negative Negative    Mucus Urine Present (A) None Seen /LPF    RBC Urine 1 <=2 /HPF    WBC Urine 2 <=5 /HPF    Squamous Epithelials Urine 2 (H) <=1 /HPF    Hyaline Casts Urine 1 <=2 /LPF    Narrative    Urine  Culture not indicated   HCG qualitative urine     Status: Normal   Result Value Ref Range    hCG Urine Qualitative Negative Negative   Comprehensive metabolic panel     Status: Abnormal   Result Value Ref Range    Sodium 142 135 - 145 mmol/L    Potassium 4.1 3.4 - 5.3 mmol/L    Carbon Dioxide (CO2) 24 22 - 29 mmol/L    Anion Gap 14 7 - 15 mmol/L    Urea Nitrogen 6.6 6.0 - 20.0 mg/dL    Creatinine 0.60 0.51 - 0.95 mg/dL    GFR Estimate >90 >60 mL/min/1.73m2    Calcium 9.1 8.6 - 10.0 mg/dL    Chloride 104 98 - 107 mmol/L    Glucose 84 70 - 99 mg/dL    Alkaline Phosphatase 69 35 - 104 U/L    AST 60 (H) 0 - 45 U/L    ALT 32 0 - 50 U/L    Protein Total 7.3 6.4 - 8.3 g/dL    Albumin 4.6 3.5 - 5.2 g/dL    Bilirubin Total 1.2 <=1.2 mg/dL   Lipase     Status: Normal   Result Value Ref Range    Lipase 19 13 - 60 U/L   Troponin T, High Sensitivity     Status: Normal   Result Value Ref Range    Troponin T, High Sensitivity <6 <=14 ng/L   CBC with platelets and differential     Status: Abnormal   Result Value Ref Range    WBC Count 4.5 4.0 - 11.0 10e3/uL    RBC Count 4.22 3.80 - 5.20 10e6/uL    Hemoglobin 14.9 11.7 - 15.7 g/dL    Hematocrit 42.1 35.0 - 47.0 %     78 - 100 fL    MCH 35.3 (H) 26.5 - 33.0 pg    MCHC 35.4 31.5 - 36.5 g/dL    RDW 12.7 10.0 - 15.0 %    Platelet Count 261 150 - 450 10e3/uL    % Neutrophils 52 %    % Lymphocytes 39 %    % Monocytes 7 %    % Eosinophils 1 %    % Basophils 1 %    % Immature Granulocytes 0 %    NRBCs per 100 WBC 0 <1 /100    Absolute Neutrophils 2.3 1.6 - 8.3 10e3/uL    Absolute Lymphocytes 1.7 0.8 - 5.3 10e3/uL    Absolute Monocytes 0.3 0.0 - 1.3 10e3/uL    Absolute Eosinophils 0.1 0.0 - 0.7 10e3/uL    Absolute Basophils 0.1 0.0 - 0.2 10e3/uL    Absolute Immature Granulocytes 0.0 <=0.4 10e3/uL    Absolute NRBCs 0.0 10e3/uL   EKG 12-lead, tracing only     Status: None   Result Value Ref Range    Systolic Blood Pressure  mmHg    Diastolic Blood Pressure  mmHg    Ventricular Rate 86  BPM    Atrial Rate 86 BPM    OR Interval 138 ms    QRS Duration 88 ms     ms    QTc 430 ms    P Axis 51 degrees    R AXIS 50 degrees    T Axis 48 degrees    Interpretation ECG Sinus rhythm with sinus arrhythmia  Normal ECG      CBC with platelets differential     Status: Abnormal    Narrative    The following orders were created for panel order CBC with platelets differential.  Procedure                               Abnormality         Status                     ---------                               -----------         ------                     CBC with platelets and d...[[id number]]  Abnormal            Final result                 Please view results for these tests on the individual orders.     Medications   alum & mag hydroxide-simethicone (MAALOX) suspension 15 mL (15 mLs Oral Not Given 9/30/23 1925)   sodium chloride 0.9% BOLUS 1,000 mL (0 mLs Intravenous Stopped 9/30/23 1926)     Labs Ordered and Resulted from Time of ED Arrival to Time of ED Departure   ROUTINE UA WITH MICROSCOPIC REFLEX TO CULTURE - Abnormal       Result Value    Color Urine Yellow      Appearance Urine Clear      Glucose Urine Negative      Bilirubin Urine Negative      Ketones Urine 40 (*)     Specific Gravity Urine 1.016      Blood Urine Negative      pH Urine 5.5      Protein Albumin Urine 50 (*)     Urobilinogen Urine Normal      Nitrite Urine Negative      Leukocyte Esterase Urine Negative      Mucus Urine Present (*)     RBC Urine 1      WBC Urine 2      Squamous Epithelials Urine 2 (*)     Hyaline Casts Urine 1     COMPREHENSIVE METABOLIC PANEL - Abnormal    Sodium 142      Potassium 4.1      Carbon Dioxide (CO2) 24      Anion Gap 14      Urea Nitrogen 6.6      Creatinine 0.60      GFR Estimate >90      Calcium 9.1      Chloride 104      Glucose 84      Alkaline Phosphatase 69      AST 60 (*)     ALT 32      Protein Total 7.3      Albumin 4.6      Bilirubin Total 1.2     CBC WITH PLATELETS AND DIFFERENTIAL - Abnormal    WBC  Count 4.5      RBC Count 4.22      Hemoglobin 14.9      Hematocrit 42.1            MCH 35.3 (*)     MCHC 35.4      RDW 12.7      Platelet Count 261      % Neutrophils 52      % Lymphocytes 39      % Monocytes 7      % Eosinophils 1      % Basophils 1      % Immature Granulocytes 0      NRBCs per 100 WBC 0      Absolute Neutrophils 2.3      Absolute Lymphocytes 1.7      Absolute Monocytes 0.3      Absolute Eosinophils 0.1      Absolute Basophils 0.1      Absolute Immature Granulocytes 0.0      Absolute NRBCs 0.0     HCG QUALITATIVE URINE - Normal    hCG Urine Qualitative Negative     LIPASE - Normal    Lipase 19     TROPONIN T, HIGH SENSITIVITY - Normal    Troponin T, High Sensitivity <6       XR Chest 2 Views   Final Result   IMPRESSION: No focal airspace consolidation. No pleural effusion or pneumothorax.      Cardiomediastinal silhouette is normal.             Critical care was not performed.     Medical Decision Making  The patient's presentation was of moderate complexity (an acute illness with systemic symptoms).    The patient's evaluation involved:  review of 3+ test result(s) ordered prior to this encounter (see separate area of note for details)    The patient's management necessitated only low risk treatment.    Assessment & Plan    Yuliana Licona is a 33 year old female with past medical history of UTI, ruptured appendici status post appendectomy complicated by Intraperitoneal abscess, and ovarian torsion who presents with complaint of nausea, vomiting, abdominal pain radiating up to the chest.    Ddx: Constipation, acid reflux, gastritis, peptic ulcer disease, dyspepsia, pancreatitis    Patient well-appearing with normal vital signs.  Heart rate 102 on arrival but normalized without intervention.  No shortness of breath or pleurisy.  Chest pain is reproducible with palpation.  I suspect it is related to acid reflux given the epigastric pain.  Patient does not take any antacids.  Given a GI  cocktail and Tylenol for the pain.  On examination she has very minimal abdominal tenderness without guarding or rebound.  No right upper quadrant tenderness.  She points to pain in the paraspinous muscles around her upper lumbar back but does not have true flank pain.  She has not had a bowel movement in 3 days which is abdomen normal for her and the pain is worse when she strains.  High suspicion that her symptoms are related to constipation and acid reflux.  EKG shows normal sinus rhythm with sinus arrhythmia but no acute ischemic changes. Troponin neg. White count and hemoglobin normal.  Urinalysis without evidence of infection and no blood.  Urine pregnancy negative.  Chest x-ray clear.  Given the examination and labs as above I do not think there is an indication for abdominal imaging.      Pt was not worried about a rash on her extremities but was advised by the ED nurse to bring it up during my examination.  The rash looks like mosquito bites on her legs with crusting from excoriation.  There are some areas that are more densely clustered such as on the right buttock.  It could also be contact dermatitis.  It is not painful and there is no skin sloughing or increased warmth.  Patient is not on any new drugs that could have caused an allergic reaction so my assessment is this is a benign dermatitis and it can be assessed by her primary care doctor and follow-up, as needed.    Lipase normal.  CMP within normal limits.  AST 60 which is significantly down from her baseline and ALT is normal today.  Normal bilirubin and kidney function.  Discussed these results with the patient and provided reassurance.  We will discharge the patient with senna docusate to treat constipation.  Also ordered Zofran for nausea and advised the patient to take over-the-counter antacid medications as needed if she develops heartburn. Also ibuprofen to treat her chest pain which could be costochondritis.  Follow-up with primary care  provider, as needed for ongoing symptoms.    I have reviewed the nursing notes. I have reviewed the findings, diagnosis, plan and need for follow up with the patient.    Discharge Medication List as of 9/30/2023  7:26 PM        START taking these medications    Details   ondansetron (ZOFRAN ODT) 4 MG ODT tab Take 1 tablet (4 mg) by mouth every 8 hours as needed for nausea, Disp-10 tablet, R-0, E-Prescribe      senna-docusate (SENOKOT-S/PERICOLACE) 8.6-50 MG tablet Take 1 tablet by mouth daily as needed for constipation, Disp-5 tablet, R-0, E-Prescribe             Final diagnoses:   Pain of upper abdomen   Constipation, unspecified constipation type   Chest pain, non-cardiac       Dorinda Leonard  [facility] EMERGENCY DEPARTMENT  9/30/2023     Dorinda Leonard MD  10/01/23 1046

## 2023-10-01 NOTE — DISCHARGE INSTRUCTIONS
Please make an appointment to follow up with Your Primary Care Provider in 2-3 days if not improving.    Take 600 mg ibuprofen every 8 hours, for pain and inflammation.  Take this on schedule, for approximately 1 week or until resolution of symptoms.    Take this medication with food to prevent stomach upset.  If you taking a chronic antacid medication, make sure to take this while you are taking this medication.    Rest, avoid repetitive motion, and lifting over 5 pounds with the effected extremity.  May use ice or heating pad to help with the pain.    You may take zofran for nausea. You may take an over the counter antacid for heartburn or acid reflux symptoms.    Take senna docusate to treat constipation. Going for walks or gentle physical activity is also helpful to encourage regular bowel movements.       Return to the emergency department if you develop worsening pain, shortness of breath, coughing, fainting, fever, recurrent vomiting or diarrhea, blood in your stool, dehydration, fever, or abdominal distention and inability to pass gas from below.

## 2023-10-31 ENCOUNTER — HOSPITAL ENCOUNTER (EMERGENCY)
Facility: CLINIC | Age: 33
Discharge: HOME | End: 2023-11-01
Attending: EMERGENCY MEDICINE | Admitting: EMERGENCY MEDICINE
Payer: COMMERCIAL

## 2023-10-31 ENCOUNTER — APPOINTMENT (OUTPATIENT)
Dept: CT IMAGING | Facility: CLINIC | Age: 33
End: 2023-10-31
Payer: COMMERCIAL

## 2023-10-31 DIAGNOSIS — R55 NEAR SYNCOPE: ICD-10-CM

## 2023-10-31 DIAGNOSIS — F10.929 ALCOHOL INTOXICATION (H): ICD-10-CM

## 2023-10-31 LAB
ALBUMIN SERPL BCG-MCNC: 4.6 G/DL (ref 3.5–5.2)
ALBUMIN UR-MCNC: NEGATIVE MG/DL
ALP SERPL-CCNC: 68 U/L (ref 35–104)
ALT SERPL W P-5'-P-CCNC: 27 U/L (ref 0–50)
ANION GAP SERPL CALCULATED.3IONS-SCNC: 15 MMOL/L (ref 7–15)
APPEARANCE UR: CLEAR
AST SERPL W P-5'-P-CCNC: 63 U/L (ref 0–45)
ATRIAL RATE - MUSE: 93 BPM
BASOPHILS # BLD AUTO: 0 10E3/UL (ref 0–0.2)
BASOPHILS NFR BLD AUTO: 0 %
BILIRUB SERPL-MCNC: 0.5 MG/DL
BILIRUB UR QL STRIP: NEGATIVE
BUN SERPL-MCNC: 8.2 MG/DL (ref 6–20)
CALCIUM SERPL-MCNC: 8.8 MG/DL (ref 8.6–10)
CHLORIDE SERPL-SCNC: 102 MMOL/L (ref 98–107)
CK SERPL-CCNC: 43 U/L (ref 26–192)
CLUE CELLS: NORMAL
COLOR UR AUTO: ABNORMAL
CREAT SERPL-MCNC: 0.71 MG/DL (ref 0.51–0.95)
DEPRECATED HCO3 PLAS-SCNC: 24 MMOL/L (ref 22–29)
DIASTOLIC BLOOD PRESSURE - MUSE: NORMAL MMHG
EGFRCR SERPLBLD CKD-EPI 2021: >90 ML/MIN/1.73M2
EOSINOPHIL # BLD AUTO: 0 10E3/UL (ref 0–0.7)
EOSINOPHIL NFR BLD AUTO: 0 %
ERYTHROCYTE [DISTWIDTH] IN BLOOD BY AUTOMATED COUNT: 13.7 % (ref 10–15)
ETHANOL SERPL-MCNC: 0.39 G/DL
GLUCOSE SERPL-MCNC: 94 MG/DL (ref 70–99)
GLUCOSE UR STRIP-MCNC: NEGATIVE MG/DL
HCG INTACT+B SERPL-ACNC: <1 MIU/ML
HCT VFR BLD AUTO: 39.7 % (ref 35–47)
HGB BLD-MCNC: 13.8 G/DL (ref 11.7–15.7)
HGB UR QL STRIP: ABNORMAL
HYALINE CASTS: 1 /LPF
IMM GRANULOCYTES # BLD: 0 10E3/UL
IMM GRANULOCYTES NFR BLD: 1 %
INTERPRETATION ECG - MUSE: NORMAL
KETONES UR STRIP-MCNC: ABNORMAL MG/DL
LEUKOCYTE ESTERASE UR QL STRIP: ABNORMAL
LIPASE SERPL-CCNC: 21 U/L (ref 13–60)
LYMPHOCYTES # BLD AUTO: 0.7 10E3/UL (ref 0.8–5.3)
LYMPHOCYTES NFR BLD AUTO: 20 %
MAGNESIUM SERPL-MCNC: 1.9 MG/DL (ref 1.7–2.3)
MCH RBC QN AUTO: 35.9 PG (ref 26.5–33)
MCHC RBC AUTO-ENTMCNC: 34.8 G/DL (ref 31.5–36.5)
MCV RBC AUTO: 103 FL (ref 78–100)
MONOCYTES # BLD AUTO: 0.4 10E3/UL (ref 0–1.3)
MONOCYTES NFR BLD AUTO: 11 %
MUCOUS THREADS #/AREA URNS LPF: PRESENT /LPF
NEUTROPHILS # BLD AUTO: 2.2 10E3/UL (ref 1.6–8.3)
NEUTROPHILS NFR BLD AUTO: 68 %
NITRATE UR QL: NEGATIVE
NRBC # BLD AUTO: 0 10E3/UL
NRBC BLD AUTO-RTO: 0 /100
P AXIS - MUSE: 42 DEGREES
PH UR STRIP: 5.5 [PH] (ref 5–7)
PLATELET # BLD AUTO: 133 10E3/UL (ref 150–450)
POTASSIUM SERPL-SCNC: 3.7 MMOL/L (ref 3.4–5.3)
PR INTERVAL - MUSE: 152 MS
PROT SERPL-MCNC: 7.1 G/DL (ref 6.4–8.3)
QRS DURATION - MUSE: 82 MS
QT - MUSE: 358 MS
QTC - MUSE: 445 MS
R AXIS - MUSE: 49 DEGREES
RBC # BLD AUTO: 3.84 10E6/UL (ref 3.8–5.2)
RBC URINE: 2 /HPF
SODIUM SERPL-SCNC: 141 MMOL/L (ref 135–145)
SP GR UR STRIP: 1.01 (ref 1–1.03)
SQUAMOUS EPITHELIAL: 1 /HPF
SYSTOLIC BLOOD PRESSURE - MUSE: NORMAL MMHG
T AXIS - MUSE: 39 DEGREES
TRICHOMONAS, WET PREP: NORMAL
TROPONIN T SERPL HS-MCNC: <6 NG/L
UROBILINOGEN UR STRIP-MCNC: NORMAL MG/DL
VENTRICULAR RATE- MUSE: 93 BPM
WBC # BLD AUTO: 3.2 10E3/UL (ref 4–11)
WBC URINE: 1 /HPF
WBC'S/HIGH POWER FIELD, WET PREP: NORMAL
YEAST, WET PREP: NORMAL

## 2023-10-31 PROCEDURE — 84702 CHORIONIC GONADOTROPIN TEST: CPT

## 2023-10-31 PROCEDURE — 74177 CT ABD & PELVIS W/CONTRAST: CPT

## 2023-10-31 PROCEDURE — 99284 EMERGENCY DEPT VISIT MOD MDM: CPT | Mod: 25

## 2023-10-31 PROCEDURE — 250N000011 HC RX IP 250 OP 636: Mod: JZ | Performed by: EMERGENCY MEDICINE

## 2023-10-31 PROCEDURE — 85041 AUTOMATED RBC COUNT: CPT

## 2023-10-31 PROCEDURE — 250N000013 HC RX MED GY IP 250 OP 250 PS 637

## 2023-10-31 PROCEDURE — 87491 CHLMYD TRACH DNA AMP PROBE: CPT

## 2023-10-31 PROCEDURE — 93005 ELECTROCARDIOGRAM TRACING: CPT

## 2023-10-31 PROCEDURE — 96361 HYDRATE IV INFUSION ADD-ON: CPT

## 2023-10-31 PROCEDURE — 93010 ELECTROCARDIOGRAM REPORT: CPT

## 2023-10-31 PROCEDURE — 84484 ASSAY OF TROPONIN QUANT: CPT

## 2023-10-31 PROCEDURE — 82077 ASSAY SPEC XCP UR&BREATH IA: CPT

## 2023-10-31 PROCEDURE — 87210 SMEAR WET MOUNT SALINE/INK: CPT

## 2023-10-31 PROCEDURE — 36415 COLL VENOUS BLD VENIPUNCTURE: CPT

## 2023-10-31 PROCEDURE — 83735 ASSAY OF MAGNESIUM: CPT

## 2023-10-31 PROCEDURE — 80053 COMPREHEN METABOLIC PANEL: CPT

## 2023-10-31 PROCEDURE — 250N000009 HC RX 250: Performed by: EMERGENCY MEDICINE

## 2023-10-31 PROCEDURE — 96360 HYDRATION IV INFUSION INIT: CPT | Mod: 59

## 2023-10-31 PROCEDURE — 83690 ASSAY OF LIPASE: CPT

## 2023-10-31 PROCEDURE — 81001 URINALYSIS AUTO W/SCOPE: CPT

## 2023-10-31 PROCEDURE — 87086 URINE CULTURE/COLONY COUNT: CPT

## 2023-10-31 PROCEDURE — 87591 N.GONORRHOEAE DNA AMP PROB: CPT

## 2023-10-31 PROCEDURE — 99285 EMERGENCY DEPT VISIT HI MDM: CPT | Mod: 25

## 2023-10-31 PROCEDURE — 82550 ASSAY OF CK (CPK): CPT

## 2023-10-31 PROCEDURE — 999N000104 CT LUMBAR SPINE RECONSTRUCTED

## 2023-10-31 RX ORDER — MAGNESIUM HYDROXIDE/ALUMINUM HYDROXICE/SIMETHICONE 120; 1200; 1200 MG/30ML; MG/30ML; MG/30ML
15 SUSPENSION ORAL ONCE
Status: COMPLETED | OUTPATIENT
Start: 2023-10-31 | End: 2023-10-31

## 2023-10-31 RX ORDER — IOPAMIDOL 755 MG/ML
100 INJECTION, SOLUTION INTRAVASCULAR ONCE
Status: COMPLETED | OUTPATIENT
Start: 2023-10-31 | End: 2023-10-31

## 2023-10-31 RX ADMIN — SODIUM CHLORIDE 60 ML: 9 INJECTION, SOLUTION INTRAVENOUS at 22:35

## 2023-10-31 RX ADMIN — ALUMINUM HYDROXIDE, MAGNESIUM HYDROXIDE, AND SIMETHICONE 15 ML: 200; 200; 20 SUSPENSION ORAL at 17:26

## 2023-10-31 RX ADMIN — IOPAMIDOL 79 ML: 755 INJECTION, SOLUTION INTRAVENOUS at 22:35

## 2023-10-31 ASSESSMENT — ACTIVITIES OF DAILY LIVING (ADL)
ADLS_ACUITY_SCORE: 33
ADLS_ACUITY_SCORE: 35

## 2023-10-31 NOTE — ED PROVIDER NOTES
"    [facility] EMERGENCY DEPARTMENT ([facility])    10/31/23      ED PROVIDER NOTE   ED 19      History     Chief Complaint   Patient presents with    Syncope    Constipation     HPI  Yuliana Licona is a 33 year old female with history of prior abdominal surgeries (appendectomy complicated by intraperitoneal abscess, ovarian torsion), metabolic dysfunction associated steatotic liver disease, alcohol use who presents with near syncope.  She was at work, while sitting at her desk she began feeling lightheaded. She stood up to talk to a coworker and lightheadedness worsened then \"everything went black.\" Patient felt like she nearly lost consciousness, and was assisted to the floor. She did not hit her head. When asked about the dizziness, she describes it as lightheadedness, denying feeling as the room is spinning or she is spinning. She states her vision seems at her baseline and she does not wear corrective lenses. She is oriented x4 in the ED and answers questions appropriately. She denies chest pain, shortness of breath, sinus congestion, ear fullness, sore throat, cough. She has a mild bilateral temporal headache.     Patient also states she has been having bright red urine while being constipated for the past 3 days. She describes dysuria for a week but denies urinary urgency or frequency. She states the hematuria is intermittent as well. She describes some epigastric abdominal pain and bilateral low back/ flank pain for the past several days. She denies fever, chills, nausea, vomiting, diarrhea, melena, hematochezia. She describes some change in her normal vaginal discharge and describes it as \"white mucous.\" She denies known pregnancy. LMP 10/12/23 and was normal for her.     Past Medical History  No past medical history on file.  Past Surgical History:   Procedure Laterality Date    APPENDECTOMY      07/18    LAPAROTOMY EXPLORATORY N/A 1/18/2018    Procedure: LAPAROTOMY EXPLORATORY;  Exploratory " Laparotomy, Appendectomy;  Surgeon: Brianna Guthrie MD;  Location: [facility] OR    LAPAROTOMY EXPLORATORY Right 9/5/2020    Procedure: Right Mini Laparotomy with Right Salpingo-oophorectomy;  Surgeon: Sade Bergman MD;  Location: [facility] OR     No current outpatient medications on file.    No Known Allergies  Family History  Family History   Problem Relation Age of Onset    Cancer No family hx of         no skin cancer     Social History   Social History     Tobacco Use    Smoking status: Never    Smokeless tobacco: Never   Substance Use Topics    Alcohol use: Yes     Comment: 3 drinks/day    Drug use: Yes     Types: Marijuana, Cocaine     Comment: reports regular marijuana use and rare cocaine use      Past medical history, past surgical history, medications, allergies, family history, and social history were reviewed with the patient. No additional pertinent items.      A medically appropriate review of systems was performed with pertinent positives and negatives noted in the HPI, and all other systems negative.    Physical Exam   BP: (!) 141/89  Pulse: 103  Temp: 98.9  F (37.2  C)  Resp: 18  SpO2: 99 %  Lying Orthostatic BP: 130/87  Lying Orthostatic Pulse: 84 bpm  Sitting Orthostatic BP: 127/94  Sitting Orthostatic Pulse: 96 bpm  Standing Orthostatic BP: 122/80  Standing Orthostatic Pulse: 104 bpm  Physical Exam  Constitutional:       General: She is not in acute distress.     Appearance: Normal appearance. She is not ill-appearing, toxic-appearing or diaphoretic.   HENT:      Mouth/Throat:      Mouth: Mucous membranes are moist.      Pharynx: Oropharynx is clear.   Eyes:      Extraocular Movements: Extraocular movements intact.      Pupils: Pupils are equal, round, and reactive to light.   Cardiovascular:      Rate and Rhythm: Normal rate and regular rhythm.      Pulses: Normal pulses.      Heart sounds: Normal heart sounds.   Pulmonary:      Effort: Pulmonary effort is normal.      Breath sounds: Normal  breath sounds.   Abdominal:      General: Abdomen is flat. Bowel sounds are increased. There is no distension.      Palpations: Abdomen is soft.      Tenderness: There is abdominal tenderness in the epigastric area. There is no right CVA tenderness, left CVA tenderness, guarding or rebound. Negative signs include Dunbar's sign, Rovsing's sign and McBurney's sign.   Skin:     General: Skin is warm and dry.      Capillary Refill: Capillary refill takes less than 2 seconds.   Neurological:      General: No focal deficit present.      Mental Status: She is alert and oriented to person, place, and time. Mental status is at baseline.   Psychiatric:         Mood and Affect: Mood normal.         Behavior: Behavior normal.           ED Course, Procedures, & Data     ED Course as of 10/31/23 2216   Tue Oct 31, 2023   1836 Alcohol ethyl(!!): 0.39   2139 When discussing blood alcohol 0.39, patient states she last drank yesterday, none today. Discussed the importance of being honest in the ED and that this may have contributed to her near syncopal episode today.      Procedures            EKG Interpretation:      Interpreted by Laura Drake PA-C  Time reviewed: 1735  Symptoms at time of EKG: None   Rhythm: normal sinus   Rate: Normal  Axis: Normal  Ectopy: none  Conduction: normal  ST Segments/ T Waves: No ST-T wave changes and T wave inversion V2  Q Waves: none  Comparison to prior: No old EKG available    Clinical Impression: normal EKG       Results for orders placed or performed during the hospital encounter of 10/31/23   Lipase     Status: Normal   Result Value Ref Range    Lipase 21 13 - 60 U/L   Comprehensive metabolic panel     Status: Abnormal   Result Value Ref Range    Sodium 141 135 - 145 mmol/L    Potassium 3.7 3.4 - 5.3 mmol/L    Carbon Dioxide (CO2) 24 22 - 29 mmol/L    Anion Gap 15 7 - 15 mmol/L    Urea Nitrogen 8.2 6.0 - 20.0 mg/dL    Creatinine 0.71 0.51 - 0.95 mg/dL    GFR Estimate >90 >60 mL/min/1.73m2     Calcium 8.8 8.6 - 10.0 mg/dL    Chloride 102 98 - 107 mmol/L    Glucose 94 70 - 99 mg/dL    Alkaline Phosphatase 68 35 - 104 U/L    AST 63 (H) 0 - 45 U/L    ALT 27 0 - 50 U/L    Protein Total 7.1 6.4 - 8.3 g/dL    Albumin 4.6 3.5 - 5.2 g/dL    Bilirubin Total 0.5 <=1.2 mg/dL   HCG quantitative pregnancy     Status: Normal   Result Value Ref Range    hCG Quantitative <1 <5 mIU/mL   UA with Microscopic reflex to Culture     Status: Abnormal    Specimen: Urine, Midstream   Result Value Ref Range    Color Urine Light Yellow Colorless, Straw, Light Yellow, Yellow    Appearance Urine Clear Clear    Glucose Urine Negative Negative mg/dL    Bilirubin Urine Negative Negative    Ketones Urine Trace (A) Negative mg/dL    Specific Gravity Urine 1.011 1.003 - 1.035    Blood Urine Moderate (A) Negative    pH Urine 5.5 5.0 - 7.0    Protein Albumin Urine Negative Negative mg/dL    Urobilinogen Urine Normal Normal, 2.0 mg/dL    Nitrite Urine Negative Negative    Leukocyte Esterase Urine Moderate (A) Negative    Mucus Urine Present (A) None Seen /LPF    RBC Urine 2 <=2 /HPF    WBC Urine 1 <=5 /HPF    Squamous Epithelials Urine 1 <=1 /HPF    Hyaline Casts Urine 1 <=2 /LPF    Narrative    Urine Culture ordered based on laboratory criteria   Troponin T, High Sensitivity     Status: Normal   Result Value Ref Range    Troponin T, High Sensitivity <6 <=14 ng/L   Magnesium     Status: Normal   Result Value Ref Range    Magnesium 1.9 1.7 - 2.3 mg/dL   Alcohol level blood     Status: Abnormal   Result Value Ref Range    Alcohol ethyl 0.39 (HH) <=0.01 g/dL   CBC with platelets and differential     Status: Abnormal   Result Value Ref Range    WBC Count 3.2 (L) 4.0 - 11.0 10e3/uL    RBC Count 3.84 3.80 - 5.20 10e6/uL    Hemoglobin 13.8 11.7 - 15.7 g/dL    Hematocrit 39.7 35.0 - 47.0 %     (H) 78 - 100 fL    MCH 35.9 (H) 26.5 - 33.0 pg    MCHC 34.8 31.5 - 36.5 g/dL    RDW 13.7 10.0 - 15.0 %    Platelet Count 133 (L) 150 - 450 10e3/uL     % Neutrophils 68 %    % Lymphocytes 20 %    % Monocytes 11 %    % Eosinophils 0 %    % Basophils 0 %    % Immature Granulocytes 1 %    NRBCs per 100 WBC 0 <1 /100    Absolute Neutrophils 2.2 1.6 - 8.3 10e3/uL    Absolute Lymphocytes 0.7 (L) 0.8 - 5.3 10e3/uL    Absolute Monocytes 0.4 0.0 - 1.3 10e3/uL    Absolute Eosinophils 0.0 0.0 - 0.7 10e3/uL    Absolute Basophils 0.0 0.0 - 0.2 10e3/uL    Absolute Immature Granulocytes 0.0 <=0.4 10e3/uL    Absolute NRBCs 0.0 10e3/uL   CK total     Status: Normal   Result Value Ref Range    CK 43 26 - 192 U/L   EKG 12-lead, complete     Status: None   Result Value Ref Range    Systolic Blood Pressure  mmHg    Diastolic Blood Pressure  mmHg    Ventricular Rate 93 BPM    Atrial Rate 93 BPM    WI Interval 152 ms    QRS Duration 82 ms     ms    QTc 445 ms    P Axis 42 degrees    R AXIS 49 degrees    T Axis 39 degrees    Interpretation ECG       Sinus rhythm  Normal ECG  Unconfirmed report - interpretation of this ECG is computer generated - see medical record for final interpretation  Confirmed by - EMERGENCY ROOM, PHYSICIAN ([id number]),  FAUSTINA OHARA ([id number]) on 10/31/2023 8:24:22 PM     Wet preparation     Status: Normal    Specimen: Vagina; Swab   Result Value Ref Range    Trichomonas Absent Absent    Yeast Absent Absent    Clue Cells Absent Absent    WBCs/high power field None None   CBC with platelets differential     Status: Abnormal    Narrative    The following orders were created for panel order CBC with platelets differential.  Procedure                               Abnormality         Status                     ---------                               -----------         ------                     CBC with platelets and d...[[id number]]  Abnormal            Final result                 Please view results for these tests on the individual orders.     Medications   iopamidol (ISOVUE-370) solution 100 mL (has no administration in time range)   sodium chloride 0.9 %  bag 100mL (has no administration in time range)   alum & mag hydroxide-simethicone (MAALOX) suspension 15 mL (15 mLs Oral $Given 10/31/23 9990)     Labs Ordered and Resulted from Time of ED Arrival to Time of ED Departure   COMPREHENSIVE METABOLIC PANEL - Abnormal       Result Value    Sodium 141      Potassium 3.7      Carbon Dioxide (CO2) 24      Anion Gap 15      Urea Nitrogen 8.2      Creatinine 0.71      GFR Estimate >90      Calcium 8.8      Chloride 102      Glucose 94      Alkaline Phosphatase 68      AST 63 (*)     ALT 27      Protein Total 7.1      Albumin 4.6      Bilirubin Total 0.5     ROUTINE UA WITH MICROSCOPIC REFLEX TO CULTURE - Abnormal    Color Urine Light Yellow      Appearance Urine Clear      Glucose Urine Negative      Bilirubin Urine Negative      Ketones Urine Trace (*)     Specific Gravity Urine 1.011      Blood Urine Moderate (*)     pH Urine 5.5      Protein Albumin Urine Negative      Urobilinogen Urine Normal      Nitrite Urine Negative      Leukocyte Esterase Urine Moderate (*)     Mucus Urine Present (*)     RBC Urine 2      WBC Urine 1      Squamous Epithelials Urine 1      Hyaline Casts Urine 1     ETHYL ALCOHOL LEVEL - Abnormal    Alcohol ethyl 0.39 (*)    CBC WITH PLATELETS AND DIFFERENTIAL - Abnormal    WBC Count 3.2 (*)     RBC Count 3.84      Hemoglobin 13.8      Hematocrit 39.7       (*)     MCH 35.9 (*)     MCHC 34.8      RDW 13.7      Platelet Count 133 (*)     % Neutrophils 68      % Lymphocytes 20      % Monocytes 11      % Eosinophils 0      % Basophils 0      % Immature Granulocytes 1      NRBCs per 100 WBC 0      Absolute Neutrophils 2.2      Absolute Lymphocytes 0.7 (*)     Absolute Monocytes 0.4      Absolute Eosinophils 0.0      Absolute Basophils 0.0      Absolute Immature Granulocytes 0.0      Absolute NRBCs 0.0     LIPASE - Normal    Lipase 21     HCG QUANTITATIVE PREGNANCY - Normal    hCG Quantitative <1     TROPONIN T, HIGH SENSITIVITY - Normal     Troponin T, High Sensitivity <6     MAGNESIUM - Normal    Magnesium 1.9     CK TOTAL - Normal    CK 43     WET PREPARATION - Normal    Trichomonas Absent      Yeast Absent      Clue Cells Absent      WBCs/high power field None     CHLAMYDIA TRACHOMATIS PCR   NEISSERIA GONORRHOEAE PCR   URINE CULTURE     CT Abdomen Pelvis w Contrast    (Results Pending)   CT Lumbar Spine Reconstructed    (Results Pending)          Critical care was not performed.     Medical Decision Making  The patient's presentation was of moderate complexity (an acute illness with systemic symptoms).    The patient's evaluation involved:  review of external note(s) from 1 sources (previous ED encounters)  ordering and/or review of 3+ test(s) in this encounter (see separate area of note for details)    The patient's management necessitated moderate risk (prescription drug management including medications given in the ED).    Assessment & Plan    Yuliana is a 33-year-old female that presented after a near syncopal episode as well as hematuria.  Mild tachycardia in the 100s but otherwise acceptable vital signs.  Patient in no acute distress nontoxic-appearing.  Patient appears mildly intoxicated but denied alcohol use today.  No acute abdomen signs on exam.  No CVA tenderness.  No neurodeficits on exam patient able to answer questions appropriately in the ED.  Blood alcohol 0.39.  Beta-hCG negative.  Urinalysis notable for moderate leukocyte esterase and moderate blood but no RBC.  P.o. GI cocktail in the ED for symptoms. Patient awaiting imaging at end of shift. Patient hand off given to staff physician at end of shift with plan to discharge home if imaging is unremarkable.     --    ED Attending Physician Attestation    KAREN TSALEY MD, MD, cared for this patient with the Advanced Practice Provider (ISHAAN). I have performed a history and physical examination of the patient independent of the ISHAAN. I reviewed the ISHAAN's documentation above and agree with  the documented findings and plan of care. I personally provided a substantive portion of the care for this patient, including the complete Medical Decision Making. Please see the ISHAAN's documentation for full details.    Summary of HPI, PE, ED Course   Patient is a 33 year old female evaluated in the emergency department for near syncopal episode and dark-colored urine.  The patient appears intoxicated on interview and exam.  Exam and ED course notable for injected conjunctiva, mild incoordination, mild speech slurring.  Heart regular rate and rhythm without murmur.  Lungs clear to auscultation bilaterally.  Abdomen soft and nontender.  No focal weakness.  Diagnostic evaluation remarkable for an alcohol level of 0.39.  UA with blood but no red blood cells.  Total CK normal at 43 so do not suspect rhabdomyolysis.  Remainder of her labs are unrevealing.  With her alcohol intoxication and complaints of dark-colored urine, CT abdomen/pelvis performed to evaluate for possible traumatic injuries.  Abdomen/pelvis CT unremarkable.  EKG is normal with a normal troponin so do not suspect ACS or arrhythmia.  Suspect patient's symptoms related to alcohol intoxication and probable associated dehydration.  In discussion with the patient, she denies daily alcohol use or need for detox;.  The patient was observed for several hours in the emergency department.  The patient will be discharged home with a sober ride.  Resources for outpatient chemical dependency assessment and treatment provided.  Primary care follow-up recommended.  Return precautions provided.  After the completion of care in the emergency department, the patient was discharged.            ISAAC STALEY MD, MD  Emergency Medicine      I have reviewed the nursing notes. I have reviewed the findings, diagnosis, plan and need for follow up with the patient.    New Prescriptions    No medications on file       Final diagnoses:   Alcohol intoxication (H24)   Near syncope        Laura Drake PA-C    [facility] EMERGENCY DEPARTMENT  10/31/2023     Chart documentation was completed with Dragon voice-recognition software. Even though reviewed, this chart may still contain some grammatical, spelling, and word errors.        Laura Drake PA-C  10/31/23 2224       Reggie Noland MD  11/01/23 1038

## 2023-10-31 NOTE — ED TRIAGE NOTES
Pt presented w/ c/o of near-syncope. Pt states she was at work and started to fel dizzy and then everything went black and pt near fainted and was assisted to the floor. Pt states she has been having bright red urine and no BM since Saturday and a week prior to that for previous      Triage Assessment (Adult)       Row Name 10/31/23 6347          Triage Assessment    Airway WDL WDL        Respiratory WDL    Respiratory WDL WDL        Cardiac WDL    Cardiac WDL WDL

## 2023-11-01 VITALS
TEMPERATURE: 98.9 F | OXYGEN SATURATION: 98 % | SYSTOLIC BLOOD PRESSURE: 125 MMHG | DIASTOLIC BLOOD PRESSURE: 85 MMHG | HEART RATE: 87 BPM | RESPIRATION RATE: 18 BRPM

## 2023-11-01 LAB
C TRACH DNA SPEC QL NAA+PROBE: NEGATIVE
N GONORRHOEA DNA SPEC QL NAA+PROBE: NEGATIVE

## 2023-11-01 NOTE — DISCHARGE INSTRUCTIONS
Avoid excessive alcohol use.  Follow-up for outpatient chemical dependency assessment and treatment.  Take acetaminophen and/or ibuprofen as needed for discomfort.    Follow-up with your primary care clinic.    Return to the emergency department if you develop tremulousness, have a seizure, develop vomiting, or other concerns.

## 2023-11-02 LAB — BACTERIA UR CULT: NORMAL

## 2024-05-15 ENCOUNTER — APPOINTMENT (OUTPATIENT)
Dept: ULTRASOUND IMAGING | Facility: CLINIC | Age: 34
End: 2024-05-15
Attending: STUDENT IN AN ORGANIZED HEALTH CARE EDUCATION/TRAINING PROGRAM

## 2024-05-15 ENCOUNTER — HOSPITAL ENCOUNTER (OUTPATIENT)
Facility: CLINIC | Age: 34
Setting detail: OBSERVATION
Discharge: HOME | End: 2024-05-16
Attending: STUDENT IN AN ORGANIZED HEALTH CARE EDUCATION/TRAINING PROGRAM | Admitting: STUDENT IN AN ORGANIZED HEALTH CARE EDUCATION/TRAINING PROGRAM
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
## 2024-05-15 DIAGNOSIS — N30.00 ACUTE CYSTITIS WITHOUT HEMATURIA: ICD-10-CM

## 2024-05-15 DIAGNOSIS — R79.89 ELEVATED LACTIC ACID LEVEL: ICD-10-CM

## 2024-05-15 DIAGNOSIS — F10.920 ALCOHOLIC INTOXICATION WITHOUT COMPLICATION (H): ICD-10-CM

## 2024-05-15 DIAGNOSIS — N39.0 URINARY TRACT INFECTION WITHOUT HEMATURIA, SITE UNSPECIFIED: ICD-10-CM

## 2024-05-15 DIAGNOSIS — R42 LIGHTHEADEDNESS: ICD-10-CM

## 2024-05-15 LAB
ALBUMIN SERPL BCG-MCNC: 4.6 G/DL (ref 3.5–5.2)
ALBUMIN UR-MCNC: NEGATIVE MG/DL
ALP SERPL-CCNC: 69 U/L (ref 40–150)
ALT SERPL W P-5'-P-CCNC: 104 U/L (ref 0–50)
AMPHETAMINES UR QL SCN: NORMAL
ANION GAP SERPL CALCULATED.3IONS-SCNC: 16 MMOL/L (ref 7–15)
APPEARANCE UR: CLEAR
AST SERPL W P-5'-P-CCNC: 290 U/L (ref 0–45)
BACTERIA #/AREA URNS HPF: ABNORMAL /HPF
BARBITURATES UR QL SCN: NORMAL
BASOPHILS # BLD AUTO: 0.1 10E3/UL (ref 0–0.2)
BASOPHILS NFR BLD AUTO: 1 %
BENZODIAZ UR QL SCN: NORMAL
BILIRUB SERPL-MCNC: 0.7 MG/DL
BILIRUB UR QL STRIP: NEGATIVE
BUN SERPL-MCNC: 5.3 MG/DL (ref 6–20)
BZE UR QL SCN: NORMAL
CALCIUM SERPL-MCNC: 9 MG/DL (ref 8.6–10)
CANNABINOIDS UR QL SCN: NORMAL
CHLORIDE SERPL-SCNC: 104 MMOL/L (ref 98–107)
COLOR UR AUTO: ABNORMAL
CREAT SERPL-MCNC: 0.5 MG/DL (ref 0.51–0.95)
DEPRECATED HCO3 PLAS-SCNC: 26 MMOL/L (ref 22–29)
EGFRCR SERPLBLD CKD-EPI 2021: >90 ML/MIN/1.73M2
EOSINOPHIL # BLD AUTO: 0 10E3/UL (ref 0–0.7)
EOSINOPHIL NFR BLD AUTO: 1 %
ERYTHROCYTE [DISTWIDTH] IN BLOOD BY AUTOMATED COUNT: 13.1 % (ref 10–15)
ETHANOL SERPL-MCNC: 0.41 G/DL
FENTANYL UR QL: NORMAL
GLUCOSE BLDC GLUCOMTR-MCNC: 62 MG/DL (ref 70–99)
GLUCOSE SERPL-MCNC: 84 MG/DL (ref 70–99)
GLUCOSE UR STRIP-MCNC: NEGATIVE MG/DL
HCG SERPL QL: NEGATIVE
HCG SERPL QL: NEGATIVE
HCT VFR BLD AUTO: 42.3 % (ref 35–47)
HGB BLD-MCNC: 15 G/DL (ref 11.7–15.7)
HGB UR QL STRIP: NEGATIVE
IMM GRANULOCYTES # BLD: 0 10E3/UL
IMM GRANULOCYTES NFR BLD: 0 %
KETONES UR STRIP-MCNC: 10 MG/DL
LACTATE SERPL-SCNC: 3.2 MMOL/L (ref 0.7–2)
LACTATE SERPL-SCNC: 3.4 MMOL/L (ref 0.7–2)
LEUKOCYTE ESTERASE UR QL STRIP: ABNORMAL
LYMPHOCYTES # BLD AUTO: 1.2 10E3/UL (ref 0.8–5.3)
LYMPHOCYTES NFR BLD AUTO: 33 %
MAGNESIUM SERPL-MCNC: 2 MG/DL (ref 1.7–2.3)
MCH RBC QN AUTO: 38.7 PG (ref 26.5–33)
MCHC RBC AUTO-ENTMCNC: 35.5 G/DL (ref 31.5–36.5)
MCV RBC AUTO: 109 FL (ref 78–100)
MONOCYTES # BLD AUTO: 0.3 10E3/UL (ref 0–1.3)
MONOCYTES NFR BLD AUTO: 7 %
NEUTROPHILS # BLD AUTO: 2.1 10E3/UL (ref 1.6–8.3)
NEUTROPHILS NFR BLD AUTO: 58 %
NITRATE UR QL: NEGATIVE
NRBC # BLD AUTO: 0 10E3/UL
NRBC BLD AUTO-RTO: 0 /100
OPIATES UR QL SCN: NORMAL
PCP QUAL URINE (ROCHE): NORMAL
PH UR STRIP: 5.5 [PH] (ref 5–7)
PHOSPHATE SERPL-MCNC: 3.8 MG/DL (ref 2.5–4.5)
PLATELET # BLD AUTO: 236 10E3/UL (ref 150–450)
POTASSIUM SERPL-SCNC: 3.9 MMOL/L (ref 3.4–5.3)
PROT SERPL-MCNC: 7.2 G/DL (ref 6.4–8.3)
RBC # BLD AUTO: 3.88 10E6/UL (ref 3.8–5.2)
RBC URINE: 0 /HPF
SODIUM SERPL-SCNC: 146 MMOL/L (ref 135–145)
SP GR UR STRIP: 1.01 (ref 1–1.03)
SQUAMOUS EPITHELIAL: 1 /HPF
UROBILINOGEN UR STRIP-MCNC: NORMAL MG/DL
WBC # BLD AUTO: 3.7 10E3/UL (ref 4–11)
WBC CLUMPS #/AREA URNS HPF: PRESENT /HPF
WBC URINE: 12 /HPF

## 2024-05-15 PROCEDURE — 93970 EXTREMITY STUDY: CPT

## 2024-05-15 PROCEDURE — 85025 COMPLETE CBC W/AUTO DIFF WBC: CPT | Performed by: STUDENT IN AN ORGANIZED HEALTH CARE EDUCATION/TRAINING PROGRAM

## 2024-05-15 PROCEDURE — 93010 ELECTROCARDIOGRAM REPORT: CPT | Performed by: STUDENT IN AN ORGANIZED HEALTH CARE EDUCATION/TRAINING PROGRAM

## 2024-05-15 PROCEDURE — 84100 ASSAY OF PHOSPHORUS: CPT | Performed by: STUDENT IN AN ORGANIZED HEALTH CARE EDUCATION/TRAINING PROGRAM

## 2024-05-15 PROCEDURE — 96365 THER/PROPH/DIAG IV INF INIT: CPT | Performed by: STUDENT IN AN ORGANIZED HEALTH CARE EDUCATION/TRAINING PROGRAM

## 2024-05-15 PROCEDURE — 84703 CHORIONIC GONADOTROPIN ASSAY: CPT | Performed by: STUDENT IN AN ORGANIZED HEALTH CARE EDUCATION/TRAINING PROGRAM

## 2024-05-15 PROCEDURE — 81001 URINALYSIS AUTO W/SCOPE: CPT | Performed by: STUDENT IN AN ORGANIZED HEALTH CARE EDUCATION/TRAINING PROGRAM

## 2024-05-15 PROCEDURE — 82040 ASSAY OF SERUM ALBUMIN: CPT | Performed by: STUDENT IN AN ORGANIZED HEALTH CARE EDUCATION/TRAINING PROGRAM

## 2024-05-15 PROCEDURE — 250N000011 HC RX IP 250 OP 636: Performed by: EMERGENCY MEDICINE

## 2024-05-15 PROCEDURE — 258N000003 HC RX IP 258 OP 636: Performed by: STUDENT IN AN ORGANIZED HEALTH CARE EDUCATION/TRAINING PROGRAM

## 2024-05-15 PROCEDURE — 93005 ELECTROCARDIOGRAM TRACING: CPT | Performed by: STUDENT IN AN ORGANIZED HEALTH CARE EDUCATION/TRAINING PROGRAM

## 2024-05-15 PROCEDURE — 99222 1ST HOSP IP/OBS MODERATE 55: CPT | Performed by: STUDENT IN AN ORGANIZED HEALTH CARE EDUCATION/TRAINING PROGRAM

## 2024-05-15 PROCEDURE — 83605 ASSAY OF LACTIC ACID: CPT | Performed by: STUDENT IN AN ORGANIZED HEALTH CARE EDUCATION/TRAINING PROGRAM

## 2024-05-15 PROCEDURE — 82962 GLUCOSE BLOOD TEST: CPT

## 2024-05-15 PROCEDURE — 80307 DRUG TEST PRSMV CHEM ANLYZR: CPT | Performed by: STUDENT IN AN ORGANIZED HEALTH CARE EDUCATION/TRAINING PROGRAM

## 2024-05-15 PROCEDURE — G0378 HOSPITAL OBSERVATION PER HR: HCPCS

## 2024-05-15 PROCEDURE — 87040 BLOOD CULTURE FOR BACTERIA: CPT | Performed by: STUDENT IN AN ORGANIZED HEALTH CARE EDUCATION/TRAINING PROGRAM

## 2024-05-15 PROCEDURE — 87086 URINE CULTURE/COLONY COUNT: CPT | Performed by: STUDENT IN AN ORGANIZED HEALTH CARE EDUCATION/TRAINING PROGRAM

## 2024-05-15 PROCEDURE — 36415 COLL VENOUS BLD VENIPUNCTURE: CPT | Performed by: STUDENT IN AN ORGANIZED HEALTH CARE EDUCATION/TRAINING PROGRAM

## 2024-05-15 PROCEDURE — 250N000011 HC RX IP 250 OP 636: Performed by: STUDENT IN AN ORGANIZED HEALTH CARE EDUCATION/TRAINING PROGRAM

## 2024-05-15 PROCEDURE — 93970 EXTREMITY STUDY: CPT | Mod: 26 | Performed by: STUDENT IN AN ORGANIZED HEALTH CARE EDUCATION/TRAINING PROGRAM

## 2024-05-15 PROCEDURE — 83735 ASSAY OF MAGNESIUM: CPT | Performed by: STUDENT IN AN ORGANIZED HEALTH CARE EDUCATION/TRAINING PROGRAM

## 2024-05-15 PROCEDURE — 96375 TX/PRO/DX INJ NEW DRUG ADDON: CPT | Performed by: STUDENT IN AN ORGANIZED HEALTH CARE EDUCATION/TRAINING PROGRAM

## 2024-05-15 PROCEDURE — 99285 EMERGENCY DEPT VISIT HI MDM: CPT | Performed by: STUDENT IN AN ORGANIZED HEALTH CARE EDUCATION/TRAINING PROGRAM

## 2024-05-15 PROCEDURE — 82077 ASSAY SPEC XCP UR&BREATH IA: CPT | Performed by: STUDENT IN AN ORGANIZED HEALTH CARE EDUCATION/TRAINING PROGRAM

## 2024-05-15 PROCEDURE — 99285 EMERGENCY DEPT VISIT HI MDM: CPT | Mod: 25 | Performed by: STUDENT IN AN ORGANIZED HEALTH CARE EDUCATION/TRAINING PROGRAM

## 2024-05-15 PROCEDURE — 96361 HYDRATE IV INFUSION ADD-ON: CPT | Performed by: STUDENT IN AN ORGANIZED HEALTH CARE EDUCATION/TRAINING PROGRAM

## 2024-05-15 RX ORDER — LORAZEPAM 2 MG/ML
1-2 INJECTION INTRAMUSCULAR EVERY 30 MIN PRN
Status: DISCONTINUED | OUTPATIENT
Start: 2024-05-15 | End: 2024-05-16

## 2024-05-15 RX ORDER — FOLIC ACID 1 MG/1
1 TABLET ORAL DAILY
Status: DISCONTINUED | OUTPATIENT
Start: 2024-05-16 | End: 2024-05-16 | Stop reason: HOSPADM

## 2024-05-15 RX ORDER — GABAPENTIN 300 MG/1
300 CAPSULE ORAL EVERY 8 HOURS
Qty: 6 CAPSULE | Refills: 0 | Status: DISCONTINUED | OUTPATIENT
Start: 2024-05-21 | End: 2024-05-16

## 2024-05-15 RX ORDER — AMOXICILLIN 250 MG
2 CAPSULE ORAL 2 TIMES DAILY PRN
Status: DISCONTINUED | OUTPATIENT
Start: 2024-05-15 | End: 2024-05-16 | Stop reason: HOSPADM

## 2024-05-15 RX ORDER — MULTIPLE VITAMINS W/ MINERALS TAB 9MG-400MCG
1 TAB ORAL DAILY
Status: DISCONTINUED | OUTPATIENT
Start: 2024-05-16 | End: 2024-05-16 | Stop reason: HOSPADM

## 2024-05-15 RX ORDER — GABAPENTIN 600 MG/1
1200 TABLET ORAL ONCE
Qty: 2 TABLET | Refills: 0 | Status: COMPLETED | OUTPATIENT
Start: 2024-05-15 | End: 2024-05-16

## 2024-05-15 RX ORDER — LORAZEPAM 1 MG/1
1-2 TABLET ORAL EVERY 30 MIN PRN
Status: DISCONTINUED | OUTPATIENT
Start: 2024-05-15 | End: 2024-05-16

## 2024-05-15 RX ORDER — CIPROFLOXACIN 500 MG/1
500 TABLET, FILM COATED ORAL 2 TIMES DAILY
Qty: 20 TABLET | Refills: 0 | Status: SHIPPED | OUTPATIENT
Start: 2024-05-15 | End: 2024-05-15

## 2024-05-15 RX ORDER — HALOPERIDOL 5 MG/ML
2.5-5 INJECTION INTRAMUSCULAR EVERY 6 HOURS PRN
Status: DISCONTINUED | OUTPATIENT
Start: 2024-05-15 | End: 2024-05-16 | Stop reason: HOSPADM

## 2024-05-15 RX ORDER — AMOXICILLIN 250 MG
1 CAPSULE ORAL 2 TIMES DAILY
Status: DISCONTINUED | OUTPATIENT
Start: 2024-05-15 | End: 2024-05-16 | Stop reason: HOSPADM

## 2024-05-15 RX ORDER — FLUMAZENIL 0.1 MG/ML
0.2 INJECTION, SOLUTION INTRAVENOUS
Status: DISCONTINUED | OUTPATIENT
Start: 2024-05-15 | End: 2024-05-16 | Stop reason: HOSPADM

## 2024-05-15 RX ORDER — THIAMINE HYDROCHLORIDE 100 MG/ML
100 INJECTION, SOLUTION INTRAMUSCULAR; INTRAVENOUS ONCE
Status: COMPLETED | OUTPATIENT
Start: 2024-05-15 | End: 2024-05-15

## 2024-05-15 RX ORDER — OMEPRAZOLE 20 MG/1
20 TABLET, DELAYED RELEASE ORAL DAILY
COMMUNITY

## 2024-05-15 RX ORDER — AMOXICILLIN 250 MG
2 CAPSULE ORAL 2 TIMES DAILY
Status: DISCONTINUED | OUTPATIENT
Start: 2024-05-15 | End: 2024-05-16 | Stop reason: HOSPADM

## 2024-05-15 RX ORDER — FOLIC ACID 5 MG/ML
1 INJECTION, SOLUTION INTRAMUSCULAR; INTRAVENOUS; SUBCUTANEOUS ONCE
Status: COMPLETED | OUTPATIENT
Start: 2024-05-15 | End: 2024-05-15

## 2024-05-15 RX ORDER — GABAPENTIN 300 MG/1
600 CAPSULE ORAL EVERY 8 HOURS
Qty: 12 CAPSULE | Refills: 0 | Status: DISCONTINUED | OUTPATIENT
Start: 2024-05-19 | End: 2024-05-16

## 2024-05-15 RX ORDER — GABAPENTIN 100 MG/1
100 CAPSULE ORAL EVERY 8 HOURS
Qty: 9 CAPSULE | Refills: 0 | Status: DISCONTINUED | OUTPATIENT
Start: 2024-05-23 | End: 2024-05-16

## 2024-05-15 RX ORDER — LIDOCAINE 40 MG/G
CREAM TOPICAL
Status: DISCONTINUED | OUTPATIENT
Start: 2024-05-15 | End: 2024-05-16 | Stop reason: HOSPADM

## 2024-05-15 RX ORDER — AMOXICILLIN 250 MG
1 CAPSULE ORAL 2 TIMES DAILY PRN
Status: DISCONTINUED | OUTPATIENT
Start: 2024-05-15 | End: 2024-05-16 | Stop reason: HOSPADM

## 2024-05-15 RX ORDER — GABAPENTIN 300 MG/1
900 CAPSULE ORAL EVERY 8 HOURS
Qty: 27 CAPSULE | Refills: 0 | Status: DISCONTINUED | OUTPATIENT
Start: 2024-05-16 | End: 2024-05-16

## 2024-05-15 RX ORDER — ONDANSETRON 4 MG/1
4 TABLET, ORALLY DISINTEGRATING ORAL EVERY 8 HOURS PRN
COMMUNITY

## 2024-05-15 RX ORDER — CALCIUM CARBONATE 500 MG/1
1000 TABLET, CHEWABLE ORAL 4 TIMES DAILY PRN
Status: DISCONTINUED | OUTPATIENT
Start: 2024-05-15 | End: 2024-05-16 | Stop reason: HOSPADM

## 2024-05-15 RX ORDER — OLANZAPINE 5 MG/1
5-10 TABLET, ORALLY DISINTEGRATING ORAL EVERY 6 HOURS PRN
Status: DISCONTINUED | OUTPATIENT
Start: 2024-05-15 | End: 2024-05-16 | Stop reason: HOSPADM

## 2024-05-15 RX ORDER — CLONIDINE HYDROCHLORIDE 0.1 MG/1
0.1 TABLET ORAL EVERY 8 HOURS
Status: DISCONTINUED | OUTPATIENT
Start: 2024-05-15 | End: 2024-05-16

## 2024-05-15 RX ORDER — CEFTRIAXONE 1 G/1
1 INJECTION, POWDER, FOR SOLUTION INTRAMUSCULAR; INTRAVENOUS ONCE
Status: COMPLETED | OUTPATIENT
Start: 2024-05-15 | End: 2024-05-15

## 2024-05-15 RX ADMIN — FOLIC ACID 1 MG: 5 INJECTION, SOLUTION INTRAMUSCULAR; INTRAVENOUS; SUBCUTANEOUS at 19:52

## 2024-05-15 RX ADMIN — THIAMINE HYDROCHLORIDE 100 MG: 100 INJECTION, SOLUTION INTRAMUSCULAR; INTRAVENOUS at 19:53

## 2024-05-15 RX ADMIN — CEFTRIAXONE SODIUM 1 G: 1 INJECTION, POWDER, FOR SOLUTION INTRAMUSCULAR; INTRAVENOUS at 18:46

## 2024-05-15 RX ADMIN — SODIUM CHLORIDE 1000 ML: 9 INJECTION, SOLUTION INTRAVENOUS at 17:40

## 2024-05-15 ASSESSMENT — ACTIVITIES OF DAILY LIVING (ADL)
ADLS_ACUITY_SCORE: 37

## 2024-05-15 ASSESSMENT — COLUMBIA-SUICIDE SEVERITY RATING SCALE - C-SSRS
6. HAVE YOU EVER DONE ANYTHING, STARTED TO DO ANYTHING, OR PREPARED TO DO ANYTHING TO END YOUR LIFE?: NO
2. HAVE YOU ACTUALLY HAD ANY THOUGHTS OF KILLING YOURSELF IN THE PAST MONTH?: NO
1. IN THE PAST MONTH, HAVE YOU WISHED YOU WERE DEAD OR WISHED YOU COULD GO TO SLEEP AND NOT WAKE UP?: NO

## 2024-05-15 NOTE — ED TRIAGE NOTES
Triage Assessment (Adult)       Row Name 05/15/24 1414          Triage Assessment    Airway WDL WDL        Respiratory WDL    Respiratory WDL WDL        Skin Circulation/Temperature WDL    Skin Circulation/Temperature WDL X   pruritis; rash        Cardiac WDL    Cardiac WDL WDL     Cardiac Rhythm NSR        Peripheral/Neurovascular WDL    Peripheral Neurovascular WDL WDL        Cognitive/Neuro/Behavioral WDL    Cognitive/Neuro/Behavioral WDL WDL

## 2024-05-15 NOTE — ED TRIAGE NOTES
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 Per EMS, pills noted to fall out of patients lab coat when she was taking it off. Upon further investigation, pills appear to possibly be clonidine and amitriptyline hydrochloride. VSS en route, bg 99.  EKG NSR.      ADDENDUM: pt states, \"I just feel like shit. I feel like I'm going to pass out.\" Pt endorses poor po intake, pruritus, rash, general malaise. Pt states she only takes otc medication. Blanchable rash to pt's left forearm and abdomen. Hx shingles, states \"somewhat\" similar.   "

## 2024-05-15 NOTE — DISCHARGE INSTRUCTIONS
You are seen in the emergency room and evaluated for lightheadedness  You are found have a urinary tract infection, we are sending you home with an antibiotic for prescription  If you have any new or worsening symptoms including but not limited to abdominal pain, back pain, fevers, chills or weakness please return to the emergency room  Otherwise please follow-up with your primary care doctor in the next 1 to 2 days.    MENTAL HEALTH RESOURCES & SERVICES:   Behavioral Healthcare Providers Scheduling  During your hospitalization, you were seen by a licensed mental health professional through [facility]  for a brief mental health assessment and/or psychotherapy at [facility] , a [facility] of [facility].  It is recommended that you follow up with your established providers (psychiatrist, mental health therapist, and/or primary care doctor - as relevant) as soon as possible. Coordinators from [facility] will be calling you in the next 24-48 hours to ensure that you have the resources you need.  You can also contact [facility] coordinators directly at [phone].    You have been scheduled for the following mental health appointments:     Date: Monday, 6/3/2024  Time: 1:00 pm - 1:30 pm  Provider: Awilda An MD, MD  Location: [facility], [address] [place] [address], [address], [place] [address]  Phone: [phone]  Type: Medication Mgmt - Initial (In-Person)    Patient Instructions  PLEASE CALL TO COMFIRM YOUR APPOINTMENT [phone]. ARRIVE 30 minutes prior to appointment time to complete paperwork. We have two locations [address] [place] [address], [address] and [address] [place] [address]. [facility] is an addiction medicine clinic, serving those with substance use disorder and mental health. We also have a treatment program for intensive outpatient treatment with groups and therapy. We accept most  insurances and state insurance. [email or web address]       [facility] maintains an extensive network of licensed behavioral health providers to connect patients with the services they need.  We do not charge providers a fee to participate in our referral network.  We match patients with providers based on a patient s specific needs, insurance coverage, and location.  Our first effort will be to refer you to a provider within your care system, and will utilize providers outside your care system as needed.         ADDITIONAL SUPPORTS:  Call or text 484 - [place] Suicide & Crisis Lifeline - if you feel like you may be in crisis or having thoughts of suicide.    [facility] [place] [facility] [place] [place] ([place]) provides support groups and educational programs. Visit www.namimn.org Helpline at 1-547.899.2506 or 724-706-3126 for further information.   [facility] ([place] on Mental [facility]ness) improves the lives of children and adults with mental illnesses and their families by providing free classes on mental illnesses andsupport groups for adults with mental illnesses, parents and family members.   For more information:  Phone: [phone]  Toll free: 7-956-LBOD-HELPS  Website: www.namihelps.org      The [place] [facility] provides a cwxi-wb-ndly approach to mental health recovery, support and wellness. Calls are answered by our team of professionally trained Certified Peer Specialists, who have first hand experience living with a mental health condition.   Open Monday-Saturday, 5pm to 10pm. Call [phone].       You may contact the [facility] for brief, short-term solution focused therapy support with your mental health goals. Call [phone] for more information or to schedule. (Virtual Appointments)          [facility]: Thank you for your interest in [facility]. Currently, patients are experiencing long waits for intake when referred within  [facility]. Please know that you may contact your insurance carrier member services to learn more about scheduling in network therapy. Your insurance company will have lists of in network therapists that are not within [facility] and may have more immediate availability.       Get care started with an ongoing therapist by calling to schedule your intake at one of the following clinics:    [facility]: [phone]  [facility]  [phone]  [facility] [phone]  [facility] [phone]  [facility] [phone]  [facility] system  [phone]   [facility] in [facility] [phone]  [facility] [phone]    Call an [facility] Behavioral Coordinator at [phone] for assistance in scheduling mental health appointments (Psychiatry/medication management, therapy, support groups, neuropsych testing, intake for programmatic care such as IOP/PHP program, etc.)      CD Treatment Contact Info    Mental Health and Addiction Services Line: [phone] Appt through [facility].    [facility] Outpatient  [facility] OP Admissions  Starla Oneil - [phone]  [email or web address]    [facility] Virtual option  Phone: [phone]  Fax: [phone]    [place]   [email or web address]  Phone: [phone]  Fax: [phone]    [facility] in [place] ([facility] [place])  Phone: [phone]  Fax: [phone]    Supportive Services    [facility]   https://www.smartrecovery.org    [place] [facility]  [address] [place] [address] [address]  [place] [address]  Phone: [phone] http://[employer].FNZ    Alcoholics Anonymous   http://www.aa.org    Community Drug & Alcohol Support Resources  Alcoholics Anonymous  24/7 Phone Line: [phone]  https://aam[place].org/ For additional list of online meetings: http://aa-intergroup.org

## 2024-05-15 NOTE — ED PROVIDER NOTES
[facility] EMERGENCY DEPARTMENT ([facility])    5/15/24       ED PROVIDER NOTE       History     Chief Complaint   Patient presents with    Dizziness     HPI  Yuliana Licona is a 33 year old female with history of prior abdominal surgeries (appendectomy complicated by intraperitoneal abscess, ovarian torsion), metabolic dysfunction associated steatotic liver disease, alcohol use who presents to the ED for dizziness. 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  She states it is not the first time this has happened, but it is the worst she is ever felt.  She reports that she was having trouble seeing and could not focus.  She also states she is experiencing pain in both calves and in her stomach.  She also reports she had shingles a couple weeks ago.    Past Medical History  No past medical history on file.  Past Surgical History:   Procedure Laterality Date    APPENDECTOMY      07/18    LAPAROTOMY EXPLORATORY N/A 1/18/2018    Procedure: LAPAROTOMY EXPLORATORY;  Exploratory Laparotomy, Appendectomy;  Surgeon: Brianna Guthrie MD;  Location: [facility] OR    LAPAROTOMY EXPLORATORY Right 9/5/2020    Procedure: Right Mini Laparotomy with Right Salpingo-oophorectomy;  Surgeon: Sade Bergman MD;  Location: [facility] OR     No current outpatient medications on file.    No Known Allergies  Family History  Family History   Problem Relation Age of Onset    Cancer No family hx of         no skin cancer     Social History   Social History     Tobacco Use    Smoking status: Never    Smokeless tobacco: Never   Substance Use Topics    Alcohol use: Yes     Comment: 3 drinks/day    Drug use: Yes     Types: Marijuana, Cocaine     Comment: reports regular marijuana use and rare cocaine use         A medically appropriate review of systems was performed with pertinent positives and negatives noted in the HPI, and all other systems negative.    Physical  Exam   BP: 133/81  Pulse: 74  Temp: 97.8  F (36.6  C)  Resp: 16  SpO2: 98 %  Physical Exam  Vitals and nursing note reviewed.   Constitutional:       General: She is not in acute distress.     Appearance: Normal appearance. She is not diaphoretic.   HENT:      Head: Atraumatic.      Mouth/Throat:      Mouth: Mucous membranes are dry.   Eyes:      General: No scleral icterus.     Conjunctiva/sclera: Conjunctivae normal.   Cardiovascular:      Rate and Rhythm: Normal rate.      Heart sounds: Normal heart sounds.   Pulmonary:      Effort: No respiratory distress.      Breath sounds: Normal breath sounds. No stridor. No wheezing, rhonchi or rales.   Chest:      Chest wall: No tenderness.   Abdominal:      General: Abdomen is flat. There is no distension.      Palpations: There is no mass.      Tenderness: There is no abdominal tenderness. There is no guarding or rebound.      Hernia: No hernia is present.   Musculoskeletal:      Cervical back: Neck supple.   Skin:     General: Skin is warm.      Coloration: Skin is pale.      Findings: No rash.   Neurological:      Mental Status: She is alert.      Comments: Able to ambulate but unsteady on her feet         ED Course, Procedures, & Data      Procedures            EKG Interpretation:      Interpreted by Steve Bassett MD  Time reviewed: 14:50:40   Symptoms at time of EKG: Dizziness  Rhythm: Normal sinus   Rate: 63 bpm      Clinical Impression: Normal sinus rhythm with no acute ischemic changes              No results found for any visits on 05/15/24.  Medications - No data to display  Labs Ordered and Resulted from Time of ED Arrival to Time of ED Departure - No data to display  No orders to display          Critical care was not performed.     Medical Decision Making  The patient's presentation was of moderate complexity (an undiagnosed new problem with uncertain diagnosis).    The patient's evaluation involved:  review of external note(s) from 2 sources (see separate  area of note for details)  ordering and/or review of 2 test(s) in this encounter (see separate area of note for details)  discussion of management or test interpretation with another health professional (incoming provider)    The patient's management necessitated further care after sign-out to Dr. Berrios pending rest of labs  (see their note for further management).    Assessment & Plan    Here in the emergency room the patient had a reassuring set of vitals, but looked pale and unsteady on her feet.  She had no abdominal pain or shortness of breath, deferred imaging chest and abdomen.  As she has no neurodeficits, no severe headache, considered head CT but deferred at this point  Ordered fluids and a full set of labs  Labs that have resulted at time of signout are UA that shows urinary tract infection as well as an elevated lactate.  At this point blood cultures and IV antibiotics ordered.    Patient signed out to incoming physician pending rest of labs and reassessment.    I have reviewed the nursing notes. I have reviewed the findings, diagnosis, plan and need for follow up with the patient.    New Prescriptions    No medications on file       Final diagnoses:   None   I, Olga Lidia Barber, am serving as a trained medical scribe to document services personally performed by Steve Bassett MD, based on the provider's statements to me.     ISteve MD, was physically present and have reviewed and verified the accuracy of this note documented by Olga Lidia Barber.     Steve Bassett MD  [facility] EMERGENCY DEPARTMENT  5/15/2024        Steve Bassett MD  05/15/24 3030

## 2024-05-16 VITALS
HEIGHT: 68 IN | RESPIRATION RATE: 20 BRPM | DIASTOLIC BLOOD PRESSURE: 85 MMHG | BODY MASS INDEX: 23.22 KG/M2 | TEMPERATURE: 98.4 F | WEIGHT: 153.2 LBS | HEART RATE: 90 BPM | OXYGEN SATURATION: 96 % | SYSTOLIC BLOOD PRESSURE: 137 MMHG

## 2024-05-16 LAB
ANION GAP SERPL CALCULATED.3IONS-SCNC: 13 MMOL/L (ref 7–15)
ATRIAL RATE - MUSE: 63 BPM
BUN SERPL-MCNC: 5.6 MG/DL (ref 6–20)
CALCIUM SERPL-MCNC: 8.6 MG/DL (ref 8.6–10)
CHLORIDE SERPL-SCNC: 100 MMOL/L (ref 98–107)
CREAT SERPL-MCNC: 0.48 MG/DL (ref 0.51–0.95)
DEPRECATED HCO3 PLAS-SCNC: 23 MMOL/L (ref 22–29)
DIASTOLIC BLOOD PRESSURE - MUSE: NORMAL MMHG
EGFRCR SERPLBLD CKD-EPI 2021: >90 ML/MIN/1.73M2
ERYTHROCYTE [DISTWIDTH] IN BLOOD BY AUTOMATED COUNT: 12.4 % (ref 10–15)
GLUCOSE BLDC GLUCOMTR-MCNC: 134 MG/DL (ref 70–99)
GLUCOSE SERPL-MCNC: 89 MG/DL (ref 70–99)
HCT VFR BLD AUTO: 37.6 % (ref 35–47)
HGB BLD-MCNC: 13 G/DL (ref 11.7–15.7)
INTERPRETATION ECG - MUSE: NORMAL
LACTATE SERPL-SCNC: 1.9 MMOL/L (ref 0.7–2)
LACTATE SERPL-SCNC: 2.3 MMOL/L (ref 0.7–2)
MCH RBC QN AUTO: 37.6 PG (ref 26.5–33)
MCHC RBC AUTO-ENTMCNC: 34.6 G/DL (ref 31.5–36.5)
MCV RBC AUTO: 109 FL (ref 78–100)
P AXIS - MUSE: 64 DEGREES
PLATELET # BLD AUTO: 226 10E3/UL (ref 150–450)
POTASSIUM SERPL-SCNC: 4.1 MMOL/L (ref 3.4–5.3)
PR INTERVAL - MUSE: 126 MS
QRS DURATION - MUSE: 80 MS
QT - MUSE: 430 MS
QTC - MUSE: 440 MS
R AXIS - MUSE: 77 DEGREES
RBC # BLD AUTO: 3.46 10E6/UL (ref 3.8–5.2)
SODIUM SERPL-SCNC: 136 MMOL/L (ref 135–145)
SYSTOLIC BLOOD PRESSURE - MUSE: NORMAL MMHG
T AXIS - MUSE: 57 DEGREES
VENTRICULAR RATE- MUSE: 63 BPM
WBC # BLD AUTO: 5.1 10E3/UL (ref 4–11)

## 2024-05-16 PROCEDURE — 36415 COLL VENOUS BLD VENIPUNCTURE: CPT | Performed by: STUDENT IN AN ORGANIZED HEALTH CARE EDUCATION/TRAINING PROGRAM

## 2024-05-16 PROCEDURE — G0378 HOSPITAL OBSERVATION PER HR: HCPCS

## 2024-05-16 PROCEDURE — 258N000003 HC RX IP 258 OP 636: Performed by: INTERNAL MEDICINE

## 2024-05-16 PROCEDURE — 36415 COLL VENOUS BLD VENIPUNCTURE: CPT | Performed by: INTERNAL MEDICINE

## 2024-05-16 PROCEDURE — 82374 ASSAY BLOOD CARBON DIOXIDE: CPT | Performed by: STUDENT IN AN ORGANIZED HEALTH CARE EDUCATION/TRAINING PROGRAM

## 2024-05-16 PROCEDURE — 83605 ASSAY OF LACTIC ACID: CPT | Performed by: INTERNAL MEDICINE

## 2024-05-16 PROCEDURE — 85027 COMPLETE CBC AUTOMATED: CPT | Performed by: STUDENT IN AN ORGANIZED HEALTH CARE EDUCATION/TRAINING PROGRAM

## 2024-05-16 PROCEDURE — 99239 HOSP IP/OBS DSCHRG MGMT >30: CPT | Performed by: INTERNAL MEDICINE

## 2024-05-16 PROCEDURE — 99254 IP/OBS CNSLTJ NEW/EST MOD 60: CPT

## 2024-05-16 PROCEDURE — 82565 ASSAY OF CREATININE: CPT | Performed by: STUDENT IN AN ORGANIZED HEALTH CARE EDUCATION/TRAINING PROGRAM

## 2024-05-16 PROCEDURE — 250N000013 HC RX MED GY IP 250 OP 250 PS 637: Performed by: STUDENT IN AN ORGANIZED HEALTH CARE EDUCATION/TRAINING PROGRAM

## 2024-05-16 PROCEDURE — 82962 GLUCOSE BLOOD TEST: CPT

## 2024-05-16 PROCEDURE — 258N000003 HC RX IP 258 OP 636: Performed by: STUDENT IN AN ORGANIZED HEALTH CARE EDUCATION/TRAINING PROGRAM

## 2024-05-16 PROCEDURE — 250N000013 HC RX MED GY IP 250 OP 250 PS 637: Performed by: INTERNAL MEDICINE

## 2024-05-16 RX ORDER — TRAZODONE HYDROCHLORIDE 50 MG/1
50 TABLET, FILM COATED ORAL AT BEDTIME
Qty: 30 TABLET | Refills: 0 | Status: SHIPPED | OUTPATIENT
Start: 2024-05-16

## 2024-05-16 RX ORDER — FOLIC ACID 1 MG/1
1 TABLET ORAL DAILY
Qty: 30 TABLET | Refills: 0 | Status: SHIPPED | OUTPATIENT
Start: 2024-05-17

## 2024-05-16 RX ORDER — AMOXICILLIN 500 MG/1
500 CAPSULE ORAL EVERY 12 HOURS
Qty: 6 CAPSULE | Refills: 0 | Status: SHIPPED | OUTPATIENT
Start: 2024-05-16 | End: 2024-05-16

## 2024-05-16 RX ORDER — SODIUM CHLORIDE 9 MG/ML
INJECTION, SOLUTION INTRAVENOUS
Status: DISCONTINUED
Start: 2024-05-16 | End: 2024-05-16 | Stop reason: HOSPADM

## 2024-05-16 RX ORDER — GABAPENTIN 100 MG/1
100 CAPSULE ORAL 3 TIMES DAILY
Status: DISCONTINUED | OUTPATIENT
Start: 2024-05-23 | End: 2024-05-16

## 2024-05-16 RX ORDER — IBUPROFEN 200 MG
400 TABLET ORAL EVERY 6 HOURS PRN
Status: DISCONTINUED | OUTPATIENT
Start: 2024-05-16 | End: 2024-05-16 | Stop reason: HOSPADM

## 2024-05-16 RX ORDER — HYDROXYZINE HYDROCHLORIDE 25 MG/1
25 TABLET, FILM COATED ORAL 3 TIMES DAILY PRN
Qty: 30 TABLET | Refills: 0 | Status: SHIPPED | OUTPATIENT
Start: 2024-05-16

## 2024-05-16 RX ORDER — GABAPENTIN 100 MG/1
100 CAPSULE ORAL 3 TIMES DAILY
Qty: 90 CAPSULE | Refills: 0 | Status: SHIPPED | OUTPATIENT
Start: 2024-05-16

## 2024-05-16 RX ORDER — AMOXICILLIN 500 MG/1
500 CAPSULE ORAL EVERY 12 HOURS SCHEDULED
Status: DISCONTINUED | OUTPATIENT
Start: 2024-05-16 | End: 2024-05-16

## 2024-05-16 RX ORDER — MULTIPLE VITAMINS W/ MINERALS TAB 9MG-400MCG
1 TAB ORAL DAILY
Qty: 100 TABLET | Refills: 0 | Status: SHIPPED | OUTPATIENT
Start: 2024-05-17

## 2024-05-16 RX ORDER — LANOLIN ALCOHOL/MO/W.PET/CERES
100 CREAM (GRAM) TOPICAL DAILY
Qty: 30 TABLET | Refills: 0 | Status: SHIPPED | OUTPATIENT
Start: 2024-05-17

## 2024-05-16 RX ORDER — GABAPENTIN 100 MG/1
100 CAPSULE ORAL 3 TIMES DAILY
Status: DISCONTINUED | OUTPATIENT
Start: 2024-05-16 | End: 2024-05-16 | Stop reason: HOSPADM

## 2024-05-16 RX ADMIN — Medication 1 TABLET: at 09:29

## 2024-05-16 RX ADMIN — GABAPENTIN 100 MG: 100 CAPSULE ORAL at 13:56

## 2024-05-16 RX ADMIN — FOLIC ACID 1 MG: 1 TABLET ORAL at 09:29

## 2024-05-16 RX ADMIN — CLONIDINE HYDROCHLORIDE 0.1 MG: 0.1 TABLET ORAL at 01:30

## 2024-05-16 RX ADMIN — SODIUM CHLORIDE 500 ML: 9 INJECTION, SOLUTION INTRAVENOUS at 11:06

## 2024-05-16 RX ADMIN — GABAPENTIN 100 MG: 100 CAPSULE ORAL at 09:29

## 2024-05-16 RX ADMIN — AMOXICILLIN AND CLAVULANATE POTASSIUM 1 TABLET: 875; 125 TABLET, FILM COATED ORAL at 11:52

## 2024-05-16 RX ADMIN — SENNOSIDES AND DOCUSATE SODIUM 1 TABLET: 50; 8.6 TABLET ORAL at 01:30

## 2024-05-16 RX ADMIN — IBUPROFEN 400 MG: 200 TABLET, FILM COATED ORAL at 15:04

## 2024-05-16 RX ADMIN — THIAMINE HCL TAB 100 MG 100 MG: 100 TAB at 09:29

## 2024-05-16 RX ADMIN — SENNOSIDES AND DOCUSATE SODIUM 1 TABLET: 8.6; 5 TABLET ORAL at 09:32

## 2024-05-16 RX ADMIN — GABAPENTIN 1200 MG: 600 TABLET, FILM COATED ORAL at 01:28

## 2024-05-16 RX ADMIN — SENNOSIDES AND DOCUSATE SODIUM 1 TABLET: 50; 8.6 TABLET ORAL at 09:31

## 2024-05-16 RX ADMIN — SODIUM CHLORIDE, POTASSIUM CHLORIDE, SODIUM LACTATE AND CALCIUM CHLORIDE 1000 ML: 600; 310; 30; 20 INJECTION, SOLUTION INTRAVENOUS at 01:41

## 2024-05-16 ASSESSMENT — ACTIVITIES OF DAILY LIVING (ADL)
ADLS_ACUITY_SCORE: 20
ADLS_ACUITY_SCORE: 21
ADLS_ACUITY_SCORE: 20
HEARING_DIFFICULTY_OR_DEAF: NO
ADLS_ACUITY_SCORE: 37
ADLS_ACUITY_SCORE: 20
ADLS_ACUITY_SCORE: 21
ADLS_ACUITY_SCORE: 21
ADLS_ACUITY_SCORE: 37
ADLS_ACUITY_SCORE: 21
ADLS_ACUITY_SCORE: 21
ADLS_ACUITY_SCORE: 37
WEAR_GLASSES_OR_BLIND: NO
ADLS_ACUITY_SCORE: 37
ADLS_ACUITY_SCORE: 20
ADLS_ACUITY_SCORE: 20
DOING_ERRANDS_INDEPENDENTLY_DIFFICULTY: NO
FALL_HISTORY_WITHIN_LAST_SIX_MONTHS: NO
WALKING_OR_CLIMBING_STAIRS_DIFFICULTY: NO
DIFFICULTY_COMMUNICATING: NO
DRESSING/BATHING_DIFFICULTY: NO
ADLS_ACUITY_SCORE: 20
ADLS_ACUITY_SCORE: 20
DIFFICULTY_EATING/SWALLOWING: NO
CHANGE_IN_FUNCTIONAL_STATUS_SINCE_ONSET_OF_CURRENT_ILLNESS/INJURY: NO
CONCENTRATING,_REMEMBERING_OR_MAKING_DECISIONS_DIFFICULTY: NO
TOILETING_ISSUES: NO

## 2024-05-16 NOTE — DISCHARGE SUMMARY
[facility]  Hospitalist Discharge Summary      Date of Admission:  5/15/2024  Date of Discharge:  5/16/2024  Discharging Provider: Ariane Santiago MD  Discharge Service: Hospitalist Service, GOLD TEAM 17    Discharge Diagnoses   Alcohol use disorder  Alcohol intoxication  Presyncope due to volume depletion   Lactic acidosis due to above  Elevated liver enzymes to to above  Thrombocytopenia and Leucopenia due to alcohol use       Clinically Significant Risk Factors          Follow-ups Needed After Discharge   Follow-up Appointments     Adult [facility] Follow-up and recommended labs and tests      Please follow up with resources provided to you by CD assessment in   pursuit of alcohol cessation and treatment   Consider establishing care with PCP and follow up     Appointments on [place] and/or [facility] (with [facility] or [facility]   provider or service). Call [phone] if you haven't heard regarding   these appointments within 7 days of discharge.            Unresulted Labs Ordered in the Past 30 Days of this Admission       Date and Time Order Name Status Description    5/15/2024  5:02 PM Blood Culture Peripheral Blood Preliminary     5/15/2024  5:02 PM Blood Culture Peripheral Blood Preliminary     5/15/2024  4:41 PM Urine Culture In process             Discharge Disposition   Discharged to home  Condition at discharge: Stable    Hospital Course    Yuliana Licona is a 33 year old female admitted on 5/15/2024. She presents to hospital with presyncope at work. Work up in ED concerning for UTI and also with positive DAVID concerning for acute alcohol intoxication       Presyncope   On exam with dizziness on standing during orthostatic vital check concerning for hypovolemia.  - EKG -  NSR, narrow QRS    - 2L IV fluid bolus   - Volume depletion likely in the setting of alcohol use. Patient's symptoms resolved with IV fluids and has been doing her ADL's     UTI    UA with LCE+, pyuria and bacturia, not highly suggestive of UTI  - Blood cx x2 - negative thus far   -  urine cx  with no growth   - Received 1 dose of ceftriaxone in the ED and one dose of aumentin in the hospital . Will not send home on abx, given the lack of growth        Acute alcohol intoxication  Concern for AUD   Lactic acidosis   Concern for impaired health professional   Per patient discussion with ED physician, she used to have significant alcohol use with history of alcohol withdrawal seizures  approx 5 years ago, Currently she notes alcohol use over weekend and last drink on Monday evening. DAVID on presentation - 0.41  Patient later confirmed that she drank the morning of admission  - CIWA protocol initiated, with largely normal CIWA scores   - Did discuss the risk that patinet may begin to withdraw, but patient felt that she is OK and wants to go home   - CD evaluation requested   - Consider discharging home on naltrexone  - [facility] Patient is aware that a report is being made   - Advised to establish care with PCP   - Inpatient psychiatry team evaluated patient and advised trazodone 50 mg at bed time and atarax 25 mg PRN for anxiety. They will set up an OP provider from a mental health standpoint         Transaminase elevation   Concern for alcohol associated liver disease   Per chart review - Prior history of AFLD  Now with elevate transaminase in AST :ALT >2:1, normal bilirubin - AIH unlikely   - Continue to trend   - Follow up with PCP        Consultations This Hospital Stay   CARE MANAGEMENT / SOCIAL WORK IP CONSULT  PSYCHIATRY IP CONSULT  CHEMICAL DEPENDENCY IP CONSULT    Code Status   Full Code    Time Spent on this Encounter   IAriane MD, personally saw the patient today and spent greater than 30 minutes discharging this patient.       Ariane Santiago MD  [facility] MED SURG  [facility]  [address]  Phone: [phone]  Fax: [phone]  ______________________________________________________________________    Physical Exam   Vital Signs: Temp: 98.4  F (36.9  C) Temp src: Oral BP: 101/61 Pulse: 75   Resp: 18 SpO2: 95 % O2 Device: None (Room air)    Weight: 153 lbs 3.2 oz  General Appearance: Awake, alert and not in distress  Respiratory: Clear breath sounds bilaterally   Cardiovascular: Normal heart sounds. No murmurs   GI: Soft, non tender. Normal bowel sounds   Skin: No bruising or bleeding   Other:Awake, alert and orientated X 3. No evidence of tremors or alcohol withdrawal          Primary Care Physician   Physician No Ref-Primary    Discharge Orders      Reason for your hospital stay    Syncope due to alcohol intoxication     Activity    Your activity upon discharge: activity as tolerated     Adult [facility] Follow-up and recommended labs and tests    Please follow up with resources provided to you by CD assessment in pursuit of alcohol cessation and treatment   Consider establishing care with PCP and follow up     Appointments on [place] and/or [facility] (with [facility] or [facility] provider or service). Call [phone] if you haven't heard regarding these appointments within 7 days of discharge.     Diet    Follow this diet upon discharge: Orders Placed This Encounter      Advance Diet as Tolerated: Clear Liquid Diet           Discharge Medications   Current Discharge Medication List        START taking these medications    Details   folic acid (FOLVITE) 1 MG tablet Take 1 tablet (1 mg) by mouth daily  Qty: 30 tablet, Refills: 0    Associated Diagnoses: Alcoholic intoxication without complication (H24)      gabapentin (NEURONTIN) 100 MG capsule Take 1 capsule (100 mg) by mouth 3 times daily  Qty: 90 capsule, Refills: 0    Associated Diagnoses: Alcoholic intoxication without complication (H24)      multivitamin w/minerals (THERA-VIT-M) tablet Take 1 tablet by mouth daily  Qty: 100 tablet,  Refills: 0    Associated Diagnoses: Alcoholic intoxication without complication (H24)      thiamine (B-1) 100 MG tablet Take 1 tablet (100 mg) by mouth daily  Qty: 30 tablet, Refills: 0    Associated Diagnoses: Alcoholic intoxication without complication (H24)           CONTINUE these medications which have NOT CHANGED    Details   DOCUSATE SODIUM PO Take 1 tablet by mouth 2 times daily as needed for constipation      omeprazole (PRILOSEC OTC) 20 MG EC tablet Take 20 mg by mouth daily      ondansetron (ZOFRAN ODT) 4 MG ODT tab Take 4 mg by mouth every 8 hours as needed for nausea           Allergies   No Known Allergies

## 2024-05-16 NOTE — CONSULTS
Initial Psychiatric Consult   Consult date: May 16, 2024         Reason for Consult, requesting source:    Anxiety   Requesting source: Kody Garcia    Labs and imaging reviewed. Patient seen and evaluated by AGUSTIN Long CNP          HPI:   Yuliana Licona is a 33 year old female admitted on 5/15/2024. She presents to hospital with presyncope at work. Work up in ED concerning for UTI and also with positive DAVID at 0.41 concerning for acute alcohol intoxication.  She was placed on CIWA protocol with ativan but has not received any ativan.     Psychiatry was consulted regarding anxiety. Patient reports she has been struggling financially which is her main stressor. She states she feels anxious 2-3 days a week, denies any panic attacks. States she struggles with sleep and that is a trigger for drinking. States she was drinking for many days prior to presentation.         Past Psychiatric History:   No history of formal psychiatric diagnoses, treatment, or psychotropic trials        Substance Use and History:   History of alcohol use disorder -- no prior outpatietn or residential treatments         Past Medical History:   PAST MEDICAL HISTORY: No past medical history on file.    PAST SURGICAL HISTORY:   Past Surgical History:   Procedure Laterality Date    APPENDECTOMY      07/18    LAPAROTOMY EXPLORATORY N/A 1/18/2018    Procedure: LAPAROTOMY EXPLORATORY;  Exploratory Laparotomy, Appendectomy;  Surgeon: Brianna Guthrie MD;  [facility]: [facility]    LAPAROTOMY EXPLORATORY Right 9/5/2020    Procedure: Right Mini Laparotomy with Right Salpingo-oophorectomy;  Surgeon: Sade Bergman MD;  Location: [facility] OR             Family History:   FAMILY HISTORY:   Family History   Problem Relation Age of Onset    Cancer No family hx of         no skin cancer       Family Psychiatric History: unknown         Social History:   SOCIAL HISTORY:   Social History     Tobacco Use    Smoking status: Never    Smokeless  tobacco: Never   Substance Use Topics    Alcohol use: Yes     Comment: 3 drinks/day       [facility]          Physical ROS:   The 10 point Review of Systems is negative other than noted in the HPI or here.           Medications:     Current Facility-Administered Medications   Medication Dose Route Frequency Provider Last Rate Last Admin    amoxicillin-clavulanate (AUGMENTIN) 875-125 MG per tablet 1 tablet  1 tablet Oral Q12H [facility] (08/20) Sanjana Young MD   1 tablet at 05/16/24 1152    folic acid (FOLVITE) tablet 1 mg  1 mg Oral Daily Kody Garcia MD   1 mg at 05/16/24 0929    gabapentin (NEURONTIN) capsule 100 mg  100 mg Oral TID Sanjana Young MD   100 mg at 05/16/24 0929    multivitamin w/minerals (THERA-VIT-M) tablet 1 tablet  1 tablet Oral Daily Kody Garcia MD   1 tablet at 05/16/24 0929    senna-docusate (SENOKOT-S/PERICOLACE) 8.6-50 MG per tablet 1 tablet  1 tablet Oral BID Kody Garcia MD   1 tablet at 05/16/24 0932    Or    senna-docusate (SENOKOT-S/PERICOLACE) 8.6-50 MG per tablet 2 tablet  2 tablet Oral BID Kody Garcia MD        sodium chloride (PF) 0.9% PF flush 3 mL  3 mL Intracatheter Q8H Kody Garcia MD   3 mL at 05/16/24 0645    sodium chloride 0.9 % infusion             thiamine (B-1) tablet 100 mg  100 mg Oral Daily Kody Garcia MD   100 mg at 05/16/24 0929    traZODone (DESYREL) half-tab 50 mg  50 mg Oral At Bedtime Malinda Demarco, APRN CNP                  Allergies:   No Known Allergies       Labs:     Lab Results   Component Value Date    WBC 5.1 05/16/2024    HGB 13.0 05/16/2024    HCT 37.6 05/16/2024     05/16/2024     05/16/2024    POTASSIUM 4.1 05/16/2024    CHLORIDE 100 05/16/2024    CO2 23 05/16/2024    BUN 5.6 (L) 05/16/2024    CR 0.48 (L) 05/16/2024    GLC 89 05/16/2024    SED 5 08/21/2023     (H) 05/15/2024     (H) 05/15/2024    ALKPHOS 69 05/15/2024    BILITOTAL 0.7 05/15/2024    INR 1.09 08/21/2023              Physical and  "Psychiatric Examination:     /83   Pulse 74   Temp 98.4  F (36.9  C) (Oral)   Resp 14   Ht 1.727 m (5' 8\")   Wt 69.5 kg (153 lb 3.2 oz)   LMP 05/01/2024 (Approximate)   SpO2 97%   BMI 23.29 kg/m    Weight is 153 lbs 3.2 oz  Body mass index is 23.29 kg/m .    Physical Exam:  I have reviewed the physical exam as documented by by the medical team and agree with findings and assessment and have no additional findings to add at this time.    Mental Status Exam:    Appearance: awake, alert and adequately groomed  Attitude:  cooperative  Eye Contact:  good  Mood:  better  Affect:  appropriate and in normal range and mood congruent  Speech:  clear, coherent  Language: Fluent in english   Psychomotor Behavior:  no evidence of tardive dyskinesia, dystonia, or tics  Thought Process:  logical and linear  Associations:  no loose associations  Thought Content:  no evidence of suicidal ideation or homicidal ideation and no evidence of psychotic thought  Insight:  fair  Judgement:  fair  Oriented to:  time, person, and place  Attention Span and Concentration:  intact  Recent and Remote Memory:  intact  Fund of Knowledge: Appropriate   Gait and Station: baseline                DSM-5 Diagnosis:   Alcohol use disorder, unspecified   Unspecified anxiety disorder           Assessment:   Yuliana is a 33 year old female who presents with presyncopal episode also found to have DAVID of 0.41 while at work. She reports situational anxiety surrounding financial difficulties and says she struggles with sleep. Agreeable to trying trazodone at bedtime as well as atarax prn for anxiety. I provided psychoeducation on alcohols effects on our mental health and encouraged cessation and treatment. She was agreeable to establishing care with a psych provider to have follow-up.           Summary of Recommendations:     Trazodone 50mg at bedtime  Atarax 25mg daily prn for anxiety   Attempting to get her set up with outpatient psych provider and " will also place CD resources in her S    Psychiatry signing off      Malinda Demarco, PMBORAP-BC  Consult/Liaison Psychiatry   [facility]

## 2024-05-16 NOTE — PROGRESS NOTES
ADMISS[facility]:    Yuliana Licona was admitted from ED for Lightheadedness.  2 RN skin assessment: completed by Caterina & Adore  Result of skin assessment and interventions/actions: Yes  Height, weight: completed? Yes  Patient belongings & admission documents: see Flowsheets, completed? Yes  MDRO education added to care plan: Ongoing    Received alert and oriented x4, in RA via stretcher, accompanied w/ 2 EMT  Complaint of moderate headache, pt preferred to sleep and take water for hydration  Hooked on telemetry - S. Bradycardia  Ambulated to bathroom, using walker. Assist of 1. Gait is unsteady.  Paged Provider about the admission  Keep call bell and mobile phone within reach.  Obtained baseline wt - 69.49 kg  No acute changes on this shift.

## 2024-05-16 NOTE — PLAN OF CARE
Pt experiencing tremors when getting ready for discharge. Paged provider to ensure pt is safe to discharge home. Provider informed pt did not want treatment and is not holdable. Pt discharging to home and boyfriend picking her up.

## 2024-05-16 NOTE — ED PROVIDER NOTES
"SIGN-OUT:  - Assumed care of this patient from Dr. Bassett  - Pending at shift change: Repeat lactate, labs, clinical reassessment  - Tentative plan from original EM Physician:   --- Per original ED provider, they felt her presentation was most likely related to a pyelonephritis type picture.    --- They were giving her IV fluids, antibiotics, with plan to clinically reassess, then likely discharge home with oral antibiotics.  --- In reviewing patient's already ordered labs, the initial lactate was 3.2, pregnancy test negative, WBC of 3.7 was actually up a little bit from most recent, UA did look like had 12 WBCs, moderate LE (urine culture was added, UDS negative)  --- CMP was pending    IVFs being given, Abx being given.      UPDATES / REASSESSMENT:  Results:   - CMP shows  and  (both up from previous), normal tbili, Na 146, Ag 16,   - Alcohol 0.41  - Repeat lactate slightly increased  Reassessment:   - Patient is awake and alert.   --- No acute issues or interventions necessary while still in the emergency department.     DISCUSSIONS:  - w/ Admitting IM team (and ANS / Risk): Reviewed case, concerns, pending studies. IM will admit, F/U pending studies, coordinate clinical and CD cares, as well as reporting. ANS coordinating any necessary legal/ethical obligations w/ the risk team given potential impaired healthcare professional concerns, but while still trying to protect this patient's rights and privacy. They initially discussed that as patient damon they would recommend she self report to [facility] (have staff dial and provide phone for her to confirm report made), or that hospital staff could report on behalf of patient. ANS communicating w/ Risk for additional clarifications in case safety of other patients could be involved. They will discuss w/ day team.   \"Report Someone to [facility]  If you are concerned about yourself, a colleague, employee, or family member, call us at [phone] and ask to " "speak to a  to learn more about our services.      To report a practitioner, open and complete the following Third Party Referral Form (PDF) and fax it to us at [phone] or e-mail 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, and was drinking heavily years ago, to the point where she even had a withdrawal seizure probably about 5 years ago  --- Patient says that she went out drinking this weekend, had a few drinks Monday/early part of the week, but does not know how her alcohol level was at the level it was today  --- Reviewed available findings, discussions/recommendations,      DISPOSITION:  IMPRESSION:   - UTI  - Elevated lactic acid  - Alcohol level 0.41  - Hx alcohol withdrawal seizure (~ 5 years ago)  DISPOSITION:   - Admit to [facility] for further evaluation/management  OTHER RECOMMENDATIONS:   - Ongoing infection mgmt/workup, hydration; F/U pending cultures  - Near syncope/syncope workup  - Withdrawal / seizure precautions  - CD assistance if patient open to such.      Sera Paerson MD  05/16/24 [id number]    "

## 2024-05-16 NOTE — CONSULTS
"Met with patient to discuss possible treatment options and to possibly complete an assessment.  Patient states she isn't interested in any KIANA treatment options or KIANA services.  \"No, I think I just need to learn to behave\"    Neel Gould [facility] on 5/16/2024 at 12:36 PM  "

## 2024-05-16 NOTE — PROGRESS NOTES
Care Management Discharge Note    Discharge Date: 05/16/2024       Discharge Disposition:  Home    Discharge Services:  None    Discharge DME:  None    Discharge Transportation:  has a friend to transport    Private pay costs discussed: Not applicable    Does the patient's insurance plan have a 3 day qualifying hospital stay waiver?  No    PAS Confirmation Code:  N/A  Patient/family educated on Medicare website which has current facility and service quality ratings:  N/A    Education Provided on the Discharge Plan:  per provider yes  Persons Notified of Discharge Plans: patient  Patient/Family in Agreement with the Plan:  Per  provider yes    Handoff Referral Completed: No-patient does not have a PCP    Additional Information:  Was instructed by treatment team to help patient set up PCP before discharge, RNCC met with patient at bedside when patient states she does not have active insurance at this time but just finished talking with the state to set up insurance. Informed patient she will need to set up appointment to establish care when active. Patient verbalized understanding.     Left VM MSG with [facility] [phone]   Received CB from Osmel Mcgrath @ ([phone]) recommended provider complete 3rd party report as there is no guarantee that patient would self report. Can be completed by provider and faxed to  or e mailed to [email or web address].       Confidential third party report form completed and faxed to [facility]    Vidhya SUMNER RN [address] [phone]  Nurse Coordinator  Securely message with [facility] RNCC

## 2024-05-16 NOTE — H&P
[facility]    History and Physical - Hospitalist Service, GOLD TEAM        Date of Admission:  5/15/2024    Assessment & Plan      Yuliana Licona is a 33 year old female admitted on 5/15/2024. She presents to hospital with presyncope at work. Work up in ED concerning for UTI and also with positive DAVID concerning for acute alcohol intoxication      Presyncope   On exam with dizziness on standing during orthostatic vital check concerning for hypovolemia.  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 DAVID on presentation - 0.41, patient not sure how her DAVID is so high.   - Monitor for alcohol withdrawal   - [facility] protocol initiated   - Addiction medicine consult if desired by patient     - When acute intoxication resolved - advise patient to report to [facility] - provider may call/confirm for patient and then hand over phone to patient or if patient declines self report - provider should report it to [facility]   Report Someone to [facility]  If you are concerned about yourself, a colleague, employee, or family member, call us at [phone] and ask to speak to a  to learn more about our services.      To report a practitioner, open and complete the following Third Party  "Referral Form (PDF) and fax it to us at [phone] or e-mail [email or web address].      Transaminase elevation   Concern for alcohol associated liver disease   Per chart review - Prior history of AFLD  Now with elevate transaminase in AST :ALT >2:1, normal bilirubin - AIH unlikely   - Continue to trend   - Follow up with PCP     Discussed plan of care with ED provider.         Diet: Advance Diet as Tolerated: Clear Liquid Diet    DVT Prophylaxis: Ambulate every shift  Nunes Catheter: Not present  Lines: None     Cardiac Monitoring: ACTIVE order. Indication: Syncope- low cardiac risk (24 hours)  Code Status: Full Code      Clinically Significant Risk Factors Present on Admission         # Hypernatremia: Highest Na = 146 mmol/L in last 2 days, will monitor as appropriate                          Disposition Plan     Medically Ready for Discharge: Anticipated in 2-4 Days           Kody Garcia MD  Hospitalist Service, [facility]  Securely message with [employer] (more info)  Text page via [facility] Paging/Directory   See signed in provider for up to date coverage information    ______________________________________________________________________    Chief Complaint   Presyncope       History is obtained from the patient    History of Present Illness   Yuliana Licona is a 33 year old female admitted on 5/15/2024. She presents to hospital with presycope at work. 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 Patient notes that she has been under stress lately due to work and personal stressors. While at work today, she felt sudden onset blurry vision, with diaphoresis and vertigo and was helped into chair by colleagues. She momentarily felt like she was \"going in and out of it\"  and EMS was called to bring her to ED.     She recalls atleast 3 similar episodes in the past, with episodes of presyncope associated with blurry vision and vertigo, " sometimes with recovery after sitting down from standing.     She does not recall any fever or chills, has ?dysuira. She has a pruritic rash over extensor aspect of forearm and thigh which started 2 days ago.  She also notes sick contact at work ( co-workers have called out sick, unspecified diagnosis ) and 1.5 weeks ago had rash on trunk which she though was shingles.     Patient notes that her last alcohol intake was Monday evening and is stressed about her positive BAC.       Past Medical History    No past medical history on file.    Past Surgical History   Past Surgical History:   Procedure Laterality Date    APPENDECTOMY      07/18    LAPAROTOMY EXPLORATORY N/A 1/18/2018    Procedure: LAPAROTOMY EXPLORATORY;  Exploratory Laparotomy, Appendectomy;  Surgeon: Brianna Guthrie MD;  Location: [facility] OR    LAPAROTOMY EXPLORATORY Right 9/5/2020    Procedure: Right Mini Laparotomy with Right Salpingo-oophorectomy;  Surgeon: Sade Bergman MD;  Location: [facility] OR       Prior to Admission Medications   Prior to Admission Medications   Prescriptions Last Dose Informant Patient Reported? Taking?   DOCUSATE SODIUM PO Unknown at unknown  Yes Yes   Sig: Take 1 tablet by mouth 2 times daily as needed for constipation   omeprazole (PRILOSEC OTC) 20 MG EC tablet 5/15/2024 at am  Yes Yes   Sig: Take 20 mg by mouth daily   ondansetron (ZOFRAN ODT) 4 MG ODT tab 5/15/2024 at am  Yes Yes   Sig: Take 4 mg by mouth every 8 hours as needed for nausea      Facility-Administered Medications: None           Physical Exam   Vital Signs: Temp: 97.9  F (36.6  C) Temp src: Oral BP: 116/68 Pulse: 81   Resp: 18 SpO2: 96 % O2 Device: None (Room air)    Weight: 160 lbs 14.97 oz      Gen: Awake and alert, appears anxious   Resp :bilateral clear   CVS: Normal S1 S2, no murmur   Abd: Non tender, normoactive bowels   : L CVA tenderness   Orthostatic vitals - aborted due to dizziness ( + for symptoms ) with diaphoresis         Medical Decision  Making             Data     I have personally reviewed the following data over the past 24 hrs:    3.7 (L)  \   15.0   / 236     146 (H) 104 5.3 (L) /  84   3.9 26 0.50 (L) \     ALT: 104 (H) AST: 290 (H) AP: 69 TBILI: 0.7   ALB: 4.6 TOT PROTEIN: 7.2 LIPASE: N/A     Procal: N/A CRP: N/A Lactic Acid: 3.4 (H)         Imaging results reviewed over the past 24 hrs:   Recent Results (from the past 24 hour(s))   US Lower Extremity Venous Duplex Bilateral    Narrative    EXAMINATION: DOPPLER VENOUS ULTRASOUND OF BILATERAL LOWER EXTREMITIES,  5/15/2024 3:34 PM     COMPARISON: None    HISTORY: Bilateral calf pain     TECHNIQUE: Gray-scale evaluation with compression, spectral flow and  color Doppler assessment of the deep venous system of both legs from  groin to knee, and then at the ankles.    FINDINGS:    In both lower extremities, the common femoral, femoral, popliteal ,  peroneal and posterior tibial veins demonstrate normal compressibility  and blood flow.      Impression    IMPRESSION:    No DVT demonstrated in either lower extremity.    I have personally reviewed the examination and initial interpretation  and I agree with the findings.    RIKI MARINELLI MD         SYSTEM ID:  [id number]

## 2024-05-16 NOTE — MEDICATION SCRIBE - ADMISSION MEDICATION HISTORY
Medication Scribe Admission Medication History    Admission medication history is complete. The information provided in this note is only as accurate as the sources available at the time of the update.    Information Source(s): Patient and CareEverywhere/SureScripts via in-person    Pertinent Information: None    Changes made to PTA medication list:  Added: All medications  Deleted: None  Changed: None    Allergies reviewed with patient and updates made in EHR: yes    Medication History Completed By: Eufemia Salvador 5/15/2024 9:08 PM    PTA Med List   Medication Sig Last Dose    DOCUSATE SODIUM PO Take 1 tablet by mouth 2 times daily as needed for constipation Unknown at unknown    omeprazole (PRILOSEC OTC) 20 MG EC tablet Take 20 mg by mouth daily 5/15/2024 at am    ondansetron (ZOFRAN ODT) 4 MG ODT tab Take 4 mg by mouth every 8 hours as needed for nausea 5/15/2024 at am

## 2024-05-20 LAB
BACTERIA BLD CULT: NO GROWTH
BACTERIA BLD CULT: NO GROWTH
BACTERIA UR CULT: ABNORMAL
BACTERIA UR CULT: ABNORMAL

## 2024-08-02 ENCOUNTER — LAB REQUISITION (OUTPATIENT)
Dept: LAB | Facility: CLINIC | Age: 34
End: 2024-08-02
Payer: COMMERCIAL

## 2024-08-02 DIAGNOSIS — F10.21 ALCOHOL DEPENDENCE, IN REMISSION (H): ICD-10-CM

## 2024-08-02 DIAGNOSIS — Z13.6 ENCOUNTER FOR SCREENING FOR CARDIOVASCULAR DISORDERS: ICD-10-CM

## 2024-08-02 DIAGNOSIS — R79.89 OTHER SPECIFIED ABNORMAL FINDINGS OF BLOOD CHEMISTRY: ICD-10-CM

## 2024-08-02 LAB
ALBUMIN SERPL BCG-MCNC: 4 G/DL (ref 3.5–5.2)
ALP SERPL-CCNC: 52 U/L (ref 40–150)
ALT SERPL W P-5'-P-CCNC: 6 U/L (ref 0–50)
ANION GAP SERPL CALCULATED.3IONS-SCNC: 10 MMOL/L (ref 7–15)
AST SERPL W P-5'-P-CCNC: 11 U/L (ref 0–45)
BILIRUB DIRECT SERPL-MCNC: <0.2 MG/DL (ref 0–0.3)
BILIRUB SERPL-MCNC: 0.3 MG/DL
BUN SERPL-MCNC: 9.3 MG/DL (ref 6–20)
CALCIUM SERPL-MCNC: 9.1 MG/DL (ref 8.8–10.4)
CHLORIDE SERPL-SCNC: 107 MMOL/L (ref 98–107)
CHOLEST SERPL-MCNC: 176 MG/DL
CREAT SERPL-MCNC: 0.59 MG/DL (ref 0.51–0.95)
EGFRCR SERPLBLD CKD-EPI 2021: >90 ML/MIN/1.73M2
FASTING STATUS PATIENT QL REPORTED: ABNORMAL
FASTING STATUS PATIENT QL REPORTED: NORMAL
FOLATE SERPL-MCNC: 5.6 NG/ML (ref 4.6–34.8)
GLUCOSE SERPL-MCNC: 94 MG/DL (ref 70–99)
HCO3 SERPL-SCNC: 23 MMOL/L (ref 22–29)
HDLC SERPL-MCNC: 49 MG/DL
LDLC SERPL CALC-MCNC: 106 MG/DL
NONHDLC SERPL-MCNC: 127 MG/DL
POTASSIUM SERPL-SCNC: 3.8 MMOL/L (ref 3.4–5.3)
PROT SERPL-MCNC: 6.6 G/DL (ref 6.4–8.3)
SODIUM SERPL-SCNC: 140 MMOL/L (ref 135–145)
TRIGL SERPL-MCNC: 106 MG/DL

## 2024-08-02 PROCEDURE — 84425 ASSAY OF VITAMIN B-1: CPT | Mod: ORL | Performed by: PHYSICIAN ASSISTANT

## 2024-08-02 PROCEDURE — 80053 COMPREHEN METABOLIC PANEL: CPT | Mod: ORL | Performed by: PHYSICIAN ASSISTANT

## 2024-08-02 PROCEDURE — 82746 ASSAY OF FOLIC ACID SERUM: CPT | Mod: ORL | Performed by: PHYSICIAN ASSISTANT

## 2024-08-02 PROCEDURE — 80061 LIPID PANEL: CPT | Mod: ORL | Performed by: PHYSICIAN ASSISTANT

## 2024-08-06 LAB — VIT B1 PYROPHOSHATE BLD-SCNC: 135 NMOL/L

## 2024-09-15 ENCOUNTER — HEALTH MAINTENANCE LETTER (OUTPATIENT)
Age: 34
End: 2024-09-15

## 2025-01-09 ENCOUNTER — HOSPITAL ENCOUNTER (EMERGENCY)
Facility: CLINIC | Age: 35
Discharge: HOME | End: 2025-01-09
Payer: COMMERCIAL

## 2025-01-09 VITALS
SYSTOLIC BLOOD PRESSURE: 140 MMHG | OXYGEN SATURATION: 97 % | DIASTOLIC BLOOD PRESSURE: 92 MMHG | HEART RATE: 117 BPM | RESPIRATION RATE: 16 BRPM | TEMPERATURE: 98.1 F

## 2025-01-09 ASSESSMENT — COLUMBIA-SUICIDE SEVERITY RATING SCALE - C-SSRS
6. HAVE YOU EVER DONE ANYTHING, STARTED TO DO ANYTHING, OR PREPARED TO DO ANYTHING TO END YOUR LIFE?: NO
1. IN THE PAST MONTH, HAVE YOU WISHED YOU WERE DEAD OR WISHED YOU COULD GO TO SLEEP AND NOT WAKE UP?: NO
2. HAVE YOU ACTUALLY HAD ANY THOUGHTS OF KILLING YOURSELF IN THE PAST MONTH?: NO

## 2025-01-09 ASSESSMENT — ACTIVITIES OF DAILY LIVING (ADL): ADLS_ACUITY_SCORE: 53

## 2025-01-09 NOTE — ED TRIAGE NOTES
Triage Assessment (Adult)       Row Name 01/09/25 8213          Triage Assessment    Airway WDL WDL        Respiratory WDL    Respiratory WDL WDL        Skin Circulation/Temperature WDL    Skin Circulation/Temperature WDL WDL        Cardiac WDL    Cardiac WDL WDL        Peripheral/Neurovascular WDL    Peripheral Neurovascular WDL WDL        Cognitive/Neuro/Behavioral WDL    Cognitive/Neuro/Behavioral WDL WDL
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
## (undated) DEVICE — GLOVE SENSICARE 7.0 MSG1070 LATEX FREE

## (undated) DEVICE — SOL WATER IRRIG 1000ML BOTTLE 2F7114

## (undated) DEVICE — SUCTION MANIFOLD DORNOCH ULTRA CART UL-CL500

## (undated) DEVICE — SPONGE LAP 18X18" X8435

## (undated) DEVICE — SU PDS II 0 TP-1 60" Z991G

## (undated) DEVICE — BARRIER INTERCEED 3X4" 4350

## (undated) DEVICE — LINEN ORTHO PACK 5446

## (undated) DEVICE — LINEN GOWN X4 5410

## (undated) DEVICE — ESU GROUND PAD UNIVERSAL W/O CORD

## (undated) DEVICE — PREP CHLORAPREP 26ML TINTED ORANGE  260815

## (undated) DEVICE — LINEN TOWEL PACK X6 WHITE 5487

## (undated) DEVICE — SU SILK 4-0 TIE 12X30" A303H

## (undated) DEVICE — WIPES FOLEY CARE SURESTEP PROVON DFC100

## (undated) DEVICE — SU VICRYL 0 CT 36" J358H

## (undated) DEVICE — SOL NACL 0.9% IRRIG 1000ML BOTTLE 2F7124

## (undated) DEVICE — LINEN TOWEL PACK X5 5464

## (undated) DEVICE — SYR 10ML FINGER CONTROL W/O NDL 309695

## (undated) DEVICE — Device

## (undated) DEVICE — SU SILK 1 TIE 6X30" A307H

## (undated) DEVICE — PREP SKIN SCRUB TRAY 4461A

## (undated) DEVICE — GLOVE PROTEXIS W/NEU-THERA 6.5  2D73TE65

## (undated) DEVICE — PREP POVIDONE IODINE SOLUTION 10% 4OZ BOTTLE 29906-004

## (undated) DEVICE — SU VICRYL 0 CT-1 CR 8X27" UND JJ41G

## (undated) DEVICE — SU SILK 0 SH 30" K834H

## (undated) DEVICE — PREP POVIDONE-IODINE 7.5% SCRUB 4OZ BOTTLE MDS093945

## (undated) DEVICE — PACK AB HYST II

## (undated) DEVICE — LINEN TOWEL PACK X30 5481

## (undated) DEVICE — DRSG ABDOMINAL 07 1/2X8" 7197D

## (undated) DEVICE — SU PDS II 0 CTX 60" Z990G

## (undated) DEVICE — SU VICRYL 3-0 SH 27" UND J416H

## (undated) DEVICE — SYR BULB IRRIG 50ML LATEX FREE 0035280

## (undated) DEVICE — DRSG GAUZE 4X4" TRAY 6939

## (undated) DEVICE — CATH TRAY FOLEY SURESTEP 16FR WDRAIN BAG STLK LATEX A300316A

## (undated) DEVICE — SU SILK 3-0 TIE 12X30" A304H

## (undated) DEVICE — NDL SPINAL 18GA 3.5" 405184

## (undated) DEVICE — SU VICRYL 0 CT-1 3X27" J430T

## (undated) DEVICE — SUCTION TIP POOLE K770

## (undated) DEVICE — STPL 80 X 4.8MM GIA8048S

## (undated) DEVICE — SU VICRYL 0 TIE 54" J608H

## (undated) DEVICE — SPONGE KITTNER 31001010

## (undated) DEVICE — GLOVE PROTEXIS BLUE W/NEU-THERA 7.0  2D73EB70

## (undated) DEVICE — TAPE MEDIPORE 4"X2YD 2864

## (undated) DEVICE — SU SILK 2-0 SH 30" K833H

## (undated) DEVICE — BARRIER SEPRAFILM 3X5" X6 SHEETS 5086-02

## (undated) DEVICE — PAD PERI INDIV WRAP 11" 2022A

## (undated) DEVICE — SPECIMEN CONTAINER 5OZ STERILE 2600SA

## (undated) DEVICE — LIGHT HANDLE X2

## (undated) DEVICE — SU VICRYL 3-0 SH 27" J316H

## (undated) DEVICE — BLADE CLIPPER SGL USE 9680

## (undated) DEVICE — ADH SKIN CLOSURE PREMIERPRO EXOFIN 1.0ML 3470

## (undated) DEVICE — STPL 60 X 3.8MM GIA6038S

## (undated) DEVICE — PAD CHUX UNDERPAD 30X36" P3036C

## (undated) DEVICE — NDL 25GA 1.5" 305127

## (undated) RX ORDER — LIDOCAINE HYDROCHLORIDE 20 MG/ML
INJECTION, SOLUTION EPIDURAL; INFILTRATION; INTRACAUDAL; PERINEURAL
Status: DISPENSED
Start: 2018-01-18

## (undated) RX ORDER — FENTANYL CITRATE 50 UG/ML
INJECTION, SOLUTION INTRAMUSCULAR; INTRAVENOUS
Status: DISPENSED
Start: 2018-01-18

## (undated) RX ORDER — HYDROMORPHONE HCL/0.9% NACL/PF 0.2MG/0.2
SYRINGE (ML) INTRAVENOUS
Status: DISPENSED
Start: 2018-01-18

## (undated) RX ORDER — ONDANSETRON 2 MG/ML
INJECTION INTRAMUSCULAR; INTRAVENOUS
Status: DISPENSED
Start: 2018-01-18

## (undated) RX ORDER — OXYCODONE HYDROCHLORIDE 5 MG/1
TABLET ORAL
Status: DISPENSED
Start: 2020-09-06

## (undated) RX ORDER — HYDROMORPHONE HYDROCHLORIDE 1 MG/ML
INJECTION, SOLUTION INTRAMUSCULAR; INTRAVENOUS; SUBCUTANEOUS
Status: DISPENSED
Start: 2020-09-06

## (undated) RX ORDER — SODIUM CHLORIDE, SODIUM LACTATE, POTASSIUM CHLORIDE, CALCIUM CHLORIDE 600; 310; 30; 20 MG/100ML; MG/100ML; MG/100ML; MG/100ML
INJECTION, SOLUTION INTRAVENOUS
Status: DISPENSED
Start: 2020-09-06

## (undated) RX ORDER — PROPOFOL 10 MG/ML
INJECTION, EMULSION INTRAVENOUS
Status: DISPENSED
Start: 2018-01-18

## (undated) RX ORDER — GLYCOPYRROLATE 0.2 MG/ML
INJECTION, SOLUTION INTRAMUSCULAR; INTRAVENOUS
Status: DISPENSED
Start: 2018-01-18